# Patient Record
Sex: FEMALE | Race: WHITE | NOT HISPANIC OR LATINO | Employment: UNEMPLOYED | URBAN - METROPOLITAN AREA
[De-identification: names, ages, dates, MRNs, and addresses within clinical notes are randomized per-mention and may not be internally consistent; named-entity substitution may affect disease eponyms.]

---

## 2017-05-02 ENCOUNTER — ALLSCRIPTS OFFICE VISIT (OUTPATIENT)
Dept: OTHER | Facility: OTHER | Age: 27
End: 2017-05-02

## 2017-05-02 DIAGNOSIS — A60.00 HERPESVIRAL INFECTION OF UROGENITAL SYSTEM: ICD-10-CM

## 2017-05-24 ENCOUNTER — HOSPITAL ENCOUNTER (EMERGENCY)
Facility: HOSPITAL | Age: 27
Discharge: HOME | End: 2017-05-24
Attending: EMERGENCY MEDICINE | Admitting: EMERGENCY MEDICINE
Payer: COMMERCIAL

## 2017-05-24 VITALS
WEIGHT: 201 LBS | TEMPERATURE: 98.5 F | DIASTOLIC BLOOD PRESSURE: 80 MMHG | SYSTOLIC BLOOD PRESSURE: 129 MMHG | OXYGEN SATURATION: 100 % | HEART RATE: 71 BPM | RESPIRATION RATE: 18 BRPM

## 2017-05-24 DIAGNOSIS — R10.2 PELVIC PAIN: Primary | ICD-10-CM

## 2017-05-24 LAB
AMORPH URATE CRY URNS QL MICRO: ABNORMAL /HPF
BACTERIA UR QL AUTO: ABNORMAL /HPF
BILIRUB UR QL STRIP: NEGATIVE
CAOX CRY URNS QL MICRO: ABNORMAL /HPF
CLARITY UR: CLEAR
COLOR UR: YELLOW
GLUCOSE UR STRIP-MCNC: NEGATIVE MG/DL
HCG UR QL: NEGATIVE
HGB UR QL STRIP.AUTO: ABNORMAL
KETONES UR STRIP-MCNC: ABNORMAL MG/DL
LEUKOCYTE ESTERASE UR QL STRIP: NEGATIVE
NITRITE UR QL STRIP: NEGATIVE
NON-SQ EPI CELLS URNS QL MICRO: ABNORMAL /HPF
PH UR STRIP.AUTO: 5.5 [PH] (ref 4.5–8)
PROT UR STRIP-MCNC: NEGATIVE MG/DL
RBC #/AREA URNS AUTO: ABNORMAL /HPF
SP GR UR STRIP.AUTO: 1.02 (ref 1–1.03)
UROBILINOGEN UR QL STRIP.AUTO: 0.2 E.U./DL
WBC #/AREA URNS AUTO: ABNORMAL /HPF

## 2017-05-24 PROCEDURE — 81025 URINE PREGNANCY TEST: CPT

## 2017-05-24 PROCEDURE — 99284 EMERGENCY DEPT VISIT MOD MDM: CPT

## 2017-05-24 PROCEDURE — 81001 URINALYSIS AUTO W/SCOPE: CPT | Performed by: EMERGENCY MEDICINE

## 2017-05-24 PROCEDURE — 96372 THER/PROPH/DIAG INJ SC/IM: CPT

## 2017-05-24 PROCEDURE — 87591 N.GONORRHOEAE DNA AMP PROB: CPT

## 2017-05-24 PROCEDURE — 87491 CHLMYD TRACH DNA AMP PROBE: CPT | Performed by: PHYSICIAN ASSISTANT

## 2017-05-24 RX ORDER — IBUPROFEN 600 MG/1
600 TABLET ORAL ONCE
Status: COMPLETED | OUTPATIENT
Start: 2017-05-24 | End: 2017-05-24

## 2017-05-24 RX ORDER — DOXYCYCLINE 100 MG/1
100 TABLET ORAL 2 TIMES DAILY
Qty: 28 TABLET | Refills: 0 | Status: SHIPPED | OUTPATIENT
Start: 2017-05-24 | End: 2017-06-07

## 2017-05-24 RX ADMIN — CEFTRIAXONE SODIUM 250 MG: 250 INJECTION, POWDER, FOR SOLUTION INTRAMUSCULAR; INTRAVENOUS at 21:41

## 2017-05-24 RX ADMIN — IBUPROFEN 600 MG: 600 TABLET ORAL at 21:00

## 2017-06-06 LAB — MISCELLANEOUS LAB TEST RESULT: NORMAL

## 2018-01-09 NOTE — MISCELLANEOUS
Message  pt  called requesting refill of Celeste--states she re-started as her period is still irregular and she does not desire pregnancy  Physicians tasked      Active Problems    1  Abnormal uterine bleeding (AUB) (626 9) (N93 9)   2  Amenorrhea (626 0) (N91 2)   3  Contraceptive management (V25 9) (Z30 9)   4  Encounter for routine gynecological examination with Papanicolaou smear of cervix   (V72 31,V76 2) (Z01 419)   5  Genital herpes simplex (054 10) (A60 00)   6  Obesity (278 00) (E66 9)   7  Polycystic ovaries (256 4) (E28 2)   8  Screening for STDs (sexually transmitted diseases) (V74 5) (Z11 3)   9  Sebaceous cyst (706 2) (L72 3)   10  UTI symptoms (788 99) (R39 9)   11  Vaginal discharge (623 5) (N89 8)    Current Meds   1  Acyclovir 400 MG Oral Tablet; TAKE 1 TABLET 3 TIMES DAILY; Therapy: [id number] to (Lanie Pont)  Requested for: [id number]; Last   Rx:[id number] Ordered    Allergies    1  Erythromycin Derivatives    2  No Known Environmental Allergies   3   No Known Food Allergies    Signatures   Electronically signed by : Julieta Byrd RN; Dec  8 2016 11:21AM EST                       (Author)

## 2018-01-13 VITALS
WEIGHT: 210 LBS | HEIGHT: 61 IN | SYSTOLIC BLOOD PRESSURE: 133 MMHG | BODY MASS INDEX: 39.65 KG/M2 | DIASTOLIC BLOOD PRESSURE: 79 MMHG

## 2018-03-26 ENCOUNTER — HOSPITAL ENCOUNTER (EMERGENCY)
Facility: HOSPITAL | Age: 28
Discharge: HOME | End: 2018-03-26
Attending: EMERGENCY MEDICINE | Admitting: EMERGENCY MEDICINE

## 2018-03-26 VITALS
SYSTOLIC BLOOD PRESSURE: 157 MMHG | HEART RATE: 104 BPM | WEIGHT: 218.26 LBS | BODY MASS INDEX: 41.24 KG/M2 | DIASTOLIC BLOOD PRESSURE: 84 MMHG | TEMPERATURE: 98.3 F | RESPIRATION RATE: 18 BRPM | OXYGEN SATURATION: 100 %

## 2018-03-26 DIAGNOSIS — R09.82 POSTNASAL DRIP: ICD-10-CM

## 2018-03-26 DIAGNOSIS — B97.89 SORE THROAT (VIRAL): ICD-10-CM

## 2018-03-26 DIAGNOSIS — S00.81XA ABRASION OF FACE, INITIAL ENCOUNTER: ICD-10-CM

## 2018-03-26 DIAGNOSIS — J06.9 URI WITH COUGH AND CONGESTION: Primary | ICD-10-CM
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
## 2018-03-26 DIAGNOSIS — J02.8 SORE THROAT (VIRAL): ICD-10-CM

## 2018-03-26 DIAGNOSIS — S09.90XA CLOSED HEAD INJURY, INITIAL ENCOUNTER: ICD-10-CM

## 2018-03-26 PROCEDURE — 99282 EMERGENCY DEPT VISIT SF MDM: CPT

## 2018-03-26 RX ORDER — FLUTICASONE PROPIONATE 50 MCG
1 SPRAY, SUSPENSION (ML) NASAL DAILY
Qty: 16 G | Refills: 0 | Status: SHIPPED | OUTPATIENT
Start: 2018-03-26 | End: 2018-05-29 | Stop reason: ALTCHOICE
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
## 2018-03-26 NOTE — DISCHARGE INSTRUCTIONS
Postnasal Drip   AMBULATORY CARE:   Postnasal drip  is a condition that causes a large amount of mucus to collect in your throat or nose  It may also be called upper airway cough syndrome because the mucus causes repeated coughing  You may have a sore throat, or throat tissues may swell  This may feel like a lump in your throat  You may also feel like you need to clear your throat often  Contact your healthcare provider if:   · You have trouble breathing because of the mucus  · You have new or worsening symptoms, even with treatment  · You have signs of an infection, such as yellow or green mucus, or a fever  · You have questions or concerns about your condition or care  Treatment  may include any of the following:  · Medicines  may be given to thin the mucus  You may need to swallow the medicine or use a device to flush your sinuses with liquid squirted into your nose  Nasal sprays may also be needed to keep the tissues in your nose moist  Medicines can also relieve congestion  Allergy medicine may help if your symptoms are caused by seasonal allergies, such as hay fever  You may need medicine to help control GERD  · Antibiotics  may be needed to treat a bacterial infection  Manage postnasal drip:   · Use a humidifier or vaporizer  Use a cool mist humidifier or a vaporizer to increase air moisture in your home  This may make it easier for you to breathe  · Drink more liquids as directed  Liquids help keep your air passages moist and help you cough up mucus  Ask how much liquid to drink each day and which liquids are best for you  · Avoid cold air and dry, heated air  Cold or dry air can trigger postnasal drip  Try to stay inside on cold days, or keep your mouth covered  Do not stay long in areas that have dry, heated air  · Do not smoke, and avoid secondhand smoke  Nicotine and other chemicals in cigarettes and cigars can irritate your throat and make coughing worse   Ask your healthcare provider for information if you currently smoke and need help to quit  E-cigarettes or smokeless tobacco still contain nicotine  Talk to your healthcare provider before you use these products  Follow up with your healthcare provider as directed:  Write down your questions so you remember to ask them during your visits  © 2017 [employer] Information is for End User's use only and may not be sold, redistributed or otherwise used for commercial purposes  All illustrations and images included in CareNotes® are the copyrighted property of [employer]  [employer]  or [employer]  The above information is an  only  It is not intended as medical advice for individual conditions or treatments  Talk to your doctor, nurse or pharmacist before following any medical regimen to see if it is safe and effective for you  Upper Respiratory Infection   AMBULATORY CARE:   An upper respiratory infection  is also called a common cold  It can affect your nose, throat, ears, and sinuses  Common signs and symptoms include the following:  Cold symptoms are usually worst for the first 3 to 5 days  You may have any of the following:  · Runny or stuffy nose    · Sneezing and coughing    · Sore throat or hoarseness    · Red, watery, and sore eyes    · Fatigue     · Chills and fever    · Headache, body aches, or sore muscles  Seek care immediately if:   · You have chest pain or trouble breathing  Contact your healthcare provider if:   · You have a fever over 102ºF (39°C)  · Your sore throat gets worse or you see white or yellow spots in your throat  · Your symptoms get worse after 3 to 5 days or your cold is not better in 14 days  · You have a rash anywhere on your skin  · You have large, tender lumps in your neck  · You have thick, green or yellow drainage from your nose  · You cough up thick yellow, green, or bloody mucus      · You have vomiting for more than 24 hours and cannot keep fluids down  · You have a bad earache  · You have questions or concerns about your condition or care  Treatment for a cold: There is no cure for the common cold  Colds are caused by viruses and do not get better with antibiotics  Most people get better in 7 to 14 days  You may continue to cough for 2 to 3 weeks  The following may help decrease your symptoms:  · Decongestants  help reduce nasal congestion and help you breathe more easily  If you take decongestant pills, they may make you feel restless or not able to sleep  Do not use decongestant sprays for more than a few days  · Cough suppressants  help reduce coughing  Ask your healthcare provider which type of cough medicine is best for you  · NSAIDs , such as ibuprofen, help decrease swelling, pain, and fever  NSAIDs can cause stomach bleeding or kidney problems in certain people  If you take blood thinner medicine, always ask your healthcare provider if NSAIDs are safe for you  Always read the medicine label and follow directions  · Acetaminophen  decreases pain and fever  It is available without a doctor's order  Ask how much to take and how often to take it  Follow directions  Read the labels of all other medicines you are using to see if they also contain acetaminophen, or ask your doctor or pharmacist  Acetaminophen can cause liver damage if not taken correctly  Do not use more than 4 grams (4,000 milligrams) total of acetaminophen in one day  Manage your cold:   · Rest as much as possible  Slowly start to do more each day  · Drink more liquids as directed  Liquids will help thin and loosen mucus so you can cough it up  Liquids will also help prevent dehydration  Liquids that help prevent dehydration include water, fruit juice, and broth  Do not drink liquids that contain caffeine  Caffeine can increase your risk for dehydration  Ask your healthcare provider how much liquid to drink each day       · Soothe a sore throat  Gargle with warm salt water  This helps your sore throat feel better  Make salt water by dissolving ¼ teaspoon salt in 1 cup warm water  You may also suck on hard candy or throat lozenges  You may use a sore throat spray  · Use a humidifier or vaporizer  Use a cool mist humidifier or a vaporizer to increase air moisture in your home  This may make it easier for you to breathe and help decrease your cough  · Use saline nasal drops as directed  These help relieve congestion  · Apply petroleum-based jelly around the outside of your nostrils  This can decrease irritation from blowing your nose  · Do not smoke  Nicotine and other chemicals in cigarettes and cigars can make your symptoms worse  They can also cause infections such as bronchitis or pneumonia  Ask your healthcare provider for information if you currently smoke and need help to quit  E-cigarettes or smokeless tobacco still contain nicotine  Talk to your healthcare provider before you use these products  Prevent spreading your cold to others:   · Try to stay away from other people during the first 2 to 3 days of your cold when it is more easily spread  · Do not share food or drinks  · Do not share hand towels with household members  · Wash your hands often, especially after you blow your nose  Turn away from other people and cover your mouth and nose with a tissue when you sneeze or cough  Follow up with your healthcare provider as directed:  Write down your questions so you remember to ask them during your visits  © 2017 [employer] Information is for End User's use only and may not be sold, redistributed or otherwise used for commercial purposes  All illustrations and images included in CareNotes® are the copyrighted property of [employer]  or [employer]ey [employer]  The above information is an  only   It is not intended as medical advice for individual conditions or treatments  Talk to your doctor, nurse or pharmacist before following any medical regimen to see if it is safe and effective for you

## 2018-04-11 ENCOUNTER — OFFICE VISIT (OUTPATIENT)
Dept: URGENT CARE | Facility: MEDICAL CENTER | Age: 28
End: 2018-04-11

## 2018-04-11 VITALS
TEMPERATURE: 98.5 F | WEIGHT: 217.6 LBS | OXYGEN SATURATION: 98 % | HEART RATE: 74 BPM | BODY MASS INDEX: 37.15 KG/M2 | DIASTOLIC BLOOD PRESSURE: 88 MMHG | SYSTOLIC BLOOD PRESSURE: 124 MMHG | HEIGHT: 64 IN | RESPIRATION RATE: 20 BRPM

## 2018-04-11 DIAGNOSIS — A08.4 VIRAL GASTROENTERITIS: Primary | ICD-10-CM

## 2018-04-11 DIAGNOSIS — H60.502 ACUTE OTITIS EXTERNA OF LEFT EAR, UNSPECIFIED TYPE: ICD-10-CM

## 2018-04-11 DIAGNOSIS — R10.84 GENERALIZED ABDOMINAL PAIN: ICD-10-CM

## 2018-04-11 LAB
SL AMB  POCT GLUCOSE, UA: ABNORMAL
SL AMB LEUKOCYTE ESTERASE,UA: ABNORMAL
SL AMB POCT BILIRUBIN,UA: ABNORMAL
SL AMB POCT BLOOD,UA: ABNORMAL
SL AMB POCT CLARITY,UA: CLEAR
SL AMB POCT COLOR,UA: YELLOW
SL AMB POCT KETONES,UA: ABNORMAL
SL AMB POCT NITRITE,UA: ABNORMAL
SL AMB POCT PH,UA: 6
SL AMB POCT SPECIFIC GRAVITY,UA: 1.02
SL AMB POCT URINE HCG: NEGATIVE
SL AMB POCT URINE PROTEIN: ABNORMAL
SL AMB POCT UROBILINOGEN: 0.2

## 2018-04-11 PROCEDURE — 81002 URINALYSIS NONAUTO W/O SCOPE: CPT | Performed by: NURSE PRACTITIONER

## 2018-04-11 PROCEDURE — 87086 URINE CULTURE/COLONY COUNT: CPT | Performed by: NURSE PRACTITIONER

## 2018-04-11 PROCEDURE — 81025 URINE PREGNANCY TEST: CPT | Performed by: NURSE PRACTITIONER

## 2018-04-11 PROCEDURE — 99213 OFFICE O/P EST LOW 20 MIN: CPT | Performed by: NURSE PRACTITIONER

## 2018-04-11 RX ORDER — PEDI MULTIVIT NO.91/IRON FUM 15 MG
1 TABLET,CHEWABLE ORAL
COMMUNITY
End: 2018-05-29 | Stop reason: ALTCHOICE

## 2018-04-11 RX ORDER — ONDANSETRON 4 MG/1
4 TABLET, ORALLY DISINTEGRATING ORAL EVERY 6 HOURS PRN
Qty: 20 TABLET | Refills: 0 | Status: SHIPPED | OUTPATIENT
Start: 2018-04-11 | End: 2018-05-29 | Stop reason: ALTCHOICE

## 2018-04-11 RX ORDER — OFLOXACIN 3 MG/ML
5 SOLUTION AURICULAR (OTIC) 2 TIMES DAILY
Qty: 5 ML | Refills: 0 | Status: SHIPPED | OUTPATIENT
Start: 2018-04-11 | End: 2018-05-29 | Stop reason: ALTCHOICE

## 2018-04-11 RX ORDER — DROSPIRENONE AND ETHINYL ESTRADIOL 0.02-3(28)
1 KIT ORAL
COMMUNITY
Start: 2018-03-19 | End: 2018-06-27 | Stop reason: ALTCHOICE

## 2018-04-11 NOTE — LETTER
April 11, 2018     Patient: Laura Patel   YOB: 1990   Date of Visit: 4/11/2018       To Whom it May Concern:    Laura Patel was seen in my clinic on 4/11/2018  She may return to work on 4/12/2018  If you have any questions or concerns, please don't hesitate to call           Sincerely,          SHERMAN Rivera        CC: No Recipients

## 2018-04-11 NOTE — PROGRESS NOTES
[facility] [employer] [facility]    NAME: Carrie Roa is a 29 y o  female  : 1990    MRN: [id number]  DATE: 2018  TIME: 10:08 AM    Assessment and Plan   Viral gastroenteritis [A08 4]  1  Viral gastroenteritis  ondansetron (ZOFRAN-ODT) 4 mg disintegrating tablet   2  Acute otitis externa of left ear, unspecified type  ofloxacin (FLOXIN) 0 3 % otic solution   3  Generalized abdominal pain  POCT urine dip    POCT urine HCG    Urine culture         Patient Instructions       1  Zofran as needed for nausea  2  Ofloxicin ear drop   5 drops to the left ear twice a day  3  Increase fluids  4  We will send your urine for culture- I will call you if it is positive  5  Follow up with your PCP  6  Present to the ER with worsening or concerning symptoms       Chief Complaint   No chief complaint on file  History of Present Illness       30 y/o female presents with abdominal pain that started yesterday morning  Associated with nausea, diarrhea, cough, chills, fatigue  She denies sore throat, headache, ear pain, dysuria, back pain  She took Tylenol last night which improved her symptoms  LMP 4 weeks ago  She was recently tested for STI's which she states were negative  She is concerned about possible pregnancy  Review of Systems   Review of Systems   Constitutional: Positive for chills and fatigue  Negative for appetite change and fever  HENT: Negative for congestion and sore throat  Respiratory: Positive for cough  Gastrointestinal: Positive for diarrhea and nausea  Negative for abdominal pain and vomiting  Genitourinary: Negative for dysuria, vaginal discharge and vaginal pain  Musculoskeletal: Positive for back pain  Skin: Negative for rash  Neurological: Negative for headaches           Current Medications       Current Outpatient Prescriptions:     drospirenone-ethinyl estradiol (TATY) 3-0 02 MG per tablet, Take 1 tablet by mouth, Disp: , Rfl:     fluticasone (FLONASE) 50 mcg/act nasal spray, 1 spray into each nostril daily, Disp: 16 g, Rfl: 0    losartan (COZAAR) 25 mg tablet, Take 25 mg by mouth daily, Disp: , Rfl:     ondansetron (ZOFRAN) 4 mg tablet, Take 1 tablet by mouth every 8 (eight) hours as needed for nausea or vomiting, Disp: 15 tablet, Rfl: 0    pediatric multivitamin-iron (POLY-VI-SOL with IRON) 15 MG chewable tablet, Chew 1 tablet, Disp: , Rfl:     Prenatal MV-Min-Fe Fum-FA-DHA (PRENATAL 1 PO), Take by mouth, Disp: , Rfl:     ofloxacin (FLOXIN) 0 3 % otic solution, Administer 5 drops into the left ear 2 (two) times a day, Disp: 5 mL, Rfl: 0    ondansetron (ZOFRAN-ODT) 4 mg disintegrating tablet, Take 1 tablet (4 mg total) by mouth every 6 (six) hours as needed for nausea or vomiting, Disp: 20 tablet, Rfl: 0    Current Allergies     Allergies as of 04/11/2018 - Reviewed 04/11/2018   Allergen Reaction Noted    Erythromycin Shortness Of Breath 10/26/2015    Levonorgestrel-ethinyl estrad Hypertension 05/22/2017    Erythromycin base  05/09/2016    Medical tape Rash 04/28/2017            The following portions of the patient's history were reviewed and updated as appropriate: allergies, current medications, past family history, past medical history, past social history, past surgical history and problem list      Past Medical History:   Diagnosis Date    Gallstone     Genital herpes     Hypertension        History reviewed  No pertinent surgical history  History reviewed  No pertinent family history  Medications have been verified  Objective   /88   Pulse 74   Temp 98 5 °F (36 9 °C) (Temporal)   Resp 20   Ht 5' 4" (1 626 m)   Wt 98 7 kg (217 lb 9 6 oz)   SpO2 98%   BMI 37 35 kg/m²        Physical Exam     Physical Exam   Constitutional: She is oriented to person, place, and time  Vital signs are normal  She appears well-developed and well-nourished  She is active  No distress  HENT:   Head: Normocephalic and atraumatic     Right Ear: External ear normal    Left Ear: Tympanic membrane and external ear normal    Nose: Nose normal    Mouth/Throat: Oropharynx is clear and moist  No oropharyngeal exudate  Left Canal erythematous    Eyes: Conjunctivae are normal  Pupils are equal, round, and reactive to light  Cardiovascular: Normal rate, regular rhythm, S1 normal, S2 normal and normal heart sounds  No murmur heard  Pulmonary/Chest: Effort normal and breath sounds normal  No respiratory distress  She has no wheezes  She has no rales  Abdominal: Soft  Bowel sounds are normal  There is no tenderness  There is no CVA tenderness  Neurological: She is alert and oriented to person, place, and time

## 2018-04-11 NOTE — PATIENT INSTRUCTIONS
1  Zofran as needed for nausea  2  Ofloxicin ear drop   5 drops to the left ear twice a day  3  Increase fluids  4  We will send your urine for culture- I will call you if it is positive  5  Follow up with your PCP  6  Present to the ER with worsening or concerning symptoms     Acute Nausea and Vomiting   WHAT YOU NEED TO KNOW:   Acute nausea and vomiting start suddenly, worsen quickly, and last a short time  DISCHARGE INSTRUCTIONS:   Return to the emergency department if:   · You see blood in your vomit or your bowel movements  · You have sudden, severe pain in your chest and upper abdomen after hard vomiting or retching  · You have swelling in your neck and chest      · You are dizzy, cold, and thirsty and your eyes and mouth are dry  · You are urinating very little or not at all  · You have muscle weakness, leg cramps, and trouble breathing  · Your heart is beating much faster than normal      · You continue to vomit for more than 48 hours  Contact your healthcare provider if:   · You have frequent dry heaves (vomiting but nothing comes out)  · Your nausea and vomiting does not get better or go away after you use medicine  · You have questions or concerns about your condition or treatment  Medicines: You may need any of the following:  · Medicines  may be given to calm your stomach and stop your vomiting  You may also need medicines to help you feel more relaxed or to stop nausea and vomiting caused by motion sickness  · Gastrointestinal stimulants  are used to help empty your stomach and bowels  This may help decrease nausea and vomiting  · Take your medicine as directed  Contact your healthcare provider if you think your medicine is not helping or if you have side effects  Tell him or her if you are allergic to any medicine  Keep a list of the medicines, vitamins, and herbs you take  Include the amounts, and when and why you take them   Bring the list or the pill bottles to follow-up visits  Carry your medicine list with you in case of an emergency  Prevent or manage acute nausea and vomiting:   · Do not drink alcohol  Alcohol may upset or irritate your stomach  Too much alcohol can also cause acute nausea and vomiting  · Control stress  Headaches due to stress may cause nausea and vomiting  Find ways to relax and manage your stress  Get more rest and sleep  · Drink more liquids as directed  Vomiting can lead to dehydration  It is important to drink more liquids to help replace lost body fluids  Ask your healthcare provider how much liquid to drink each day and which liquids are best for you  Your provider may recommend that you drink an oral rehydration solution (ORS)  ORS contains water, salts, and sugar that are needed to replace the lost body fluids  Ask what kind of ORS to use, how much to drink, and where to get it  · Eat smaller meals, more often  Eat small amounts of food every 2 to 3 hours, even if you are not hungry  Food in your stomach may decrease your nausea  · Talk to your healthcare provider before you take over-the-counter (OTC) medicines  These medicines can cause serious problems if you use certain other medicines, or you have a medical condition  You may have problems if you use too much or use them for longer than the label says  Follow directions on the label carefully  Follow up with your healthcare provider as directed:  Write down your questions so you remember to ask them during your follow-up visits  © 2017 [employer] Information is for End User's use only and may not be sold, redistributed or otherwise used for commercial purposes  All illustrations and images included in CareNotes® are the copyrighted property of A D A M [employer] Inc  or [employer] Nam  The above information is an  only  It is not intended as medical advice for individual conditions or treatments   Talk to your doctor, nurse or pharmacist before following any medical regimen to see if it is safe and effective for you  Otitis Externa   WHAT YOU NEED TO KNOW:   Otitis externa, or swimmer's ear, is an infection in the outer ear canal  This canal goes from the outside of the ear to the eardrum  DISCHARGE INSTRUCTIONS:   Return to the emergency department if:   · You have severe ear pain  · You are suddenly unable to hear at all  · You have new swelling in your face, behind your ears, or in your neck  · You suddenly cannot move part of your face  · Your face suddenly feels numb  Contact your healthcare provider if:   · You have a fever  · Your signs and symptoms do not get better after 2 days of treatment  · Your signs and symptoms go away for a time, but then come back  · You have questions or concerns about your condition or care  Medicines:   · NSAIDs , such as ibuprofen, help decrease swelling, pain, and fever  This medicine is available with or without a doctor's order  NSAIDs can cause stomach bleeding or kidney problems in certain people  If you take blood thinner medicine, always ask if NSAIDs are safe for you  Always read the medicine label and follow directions  Do not give these medicines to children under 10months of age without direction from your child's healthcare provider  · Acetaminophen  decreases pain and fever  It is available without a doctor's order  Ask how much to take and how often to take it  Follow directions  Acetaminophen can cause liver damage if not taken correctly  · Ear drops  that contain an antibiotic may be given  The antibiotic helps treat a bacterial infection  You may also be given steroid medicine  The steroid helps decrease redness, swelling, and pain  · Take your medicine as directed  Contact your healthcare provider if you think your medicine is not helping or if you have side effects  Tell him or her if you are allergic to any medicine   Keep a list of the medicines, vitamins, and herbs you take  Include the amounts, and when and why you take them  Bring the list or the pill bottles to follow-up visits  Carry your medicine list with you in case of an emergency  Follow up with your healthcare provider as directed:  Write down your questions so you remember to ask them during your visits  How to use eardrops:   · Lie down on your side with your infected ear facing up  · Carefully drip the correct number of eardrops into your ear  Have another person help you if possible  · Gently move the outside part of your ear back and forth to help the medicine reach your ear canal      · Stay lying down in the same position (with your ear facing up) for 3 to 5 minutes  Prevent otitis externa:   · Do not put cotton swabs or foreign objects in your ears  · Wrap a clean moist washcloth around your finger, and use it to clean your outer ear and remove extra ear wax  · Use ear plugs when you swim  Dry your outer ears completely after you swim or bathe  © 2017 [facility] [facility] Information is for End User's use only and may not be sold, redistributed or otherwise used for commercial purposes  All illustrations and images included in CareNotes® are the copyrighted property of A D A M , Inc  or [employer] Nam  The above information is an  only  It is not intended as medical advice for individual conditions or treatments  Talk to your doctor, nurse or pharmacist before following any medical regimen to see if it is safe and effective for you

## 2018-04-12 LAB — BACTERIA UR CULT: ABNORMAL

## 2018-04-13 ENCOUNTER — TELEPHONE (OUTPATIENT)
Dept: URGENT CARE | Facility: MEDICAL CENTER | Age: 28
End: 2018-04-13

## 2018-04-13 NOTE — TELEPHONE ENCOUNTER
Called number listed for patient  - the number is not accepting calls at this time  I called number for her mother [phone]- went to -   Mailbox full unable to leave

## 2018-04-30 ENCOUNTER — TRANSCRIBE ORDERS (OUTPATIENT)
Dept: URGENT CARE | Facility: MEDICAL CENTER | Age: 28
End: 2018-04-30

## 2018-04-30 ENCOUNTER — APPOINTMENT (OUTPATIENT)
Dept: URGENT CARE | Facility: MEDICAL CENTER | Age: 28
End: 2018-04-30

## 2018-04-30 DIAGNOSIS — Z00.00 PHYSICAL EXAM: Primary | ICD-10-CM

## 2018-04-30 DIAGNOSIS — Z00.00 PHYSICAL EXAM: ICD-10-CM

## 2018-04-30 PROCEDURE — 86787 VARICELLA-ZOSTER ANTIBODY: CPT

## 2018-04-30 PROCEDURE — 86480 TB TEST CELL IMMUN MEASURE: CPT

## 2018-05-01 LAB — VZV IGG SER IA-ACNC: NORMAL

## 2018-05-02 LAB
ANNOTATION COMMENT IMP: NORMAL
GAMMA INTERFERON BACKGROUND BLD IA-ACNC: 0.07 IU/ML
M TB IFN-G BLD-IMP: NEGATIVE
M TB IFN-G CD4+ BCKGRND COR BLD-ACNC: 0.02 IU/ML
M TB IFN-G CD4+ T-CELLS BLD-ACNC: 0.09 IU/ML
MITOGEN IGNF BLD-ACNC: >10 IU/ML
QUANTIFERON-TB GOLD IN TUBE: NORMAL
SERVICE CMNT-IMP: NORMAL

## 2018-05-29 ENCOUNTER — HOSPITAL ENCOUNTER (EMERGENCY)
Facility: HOSPITAL | Age: 28
Discharge: HOME | End: 2018-05-29
Attending: EMERGENCY MEDICINE | Admitting: EMERGENCY MEDICINE
Payer: COMMERCIAL

## 2018-05-29 ENCOUNTER — APPOINTMENT (EMERGENCY)
Dept: RADIOLOGY | Facility: HOSPITAL | Age: 28
End: 2018-05-29
Payer: COMMERCIAL

## 2018-05-29 VITALS
DIASTOLIC BLOOD PRESSURE: 62 MMHG | BODY MASS INDEX: 39.27 KG/M2 | RESPIRATION RATE: 16 BRPM | WEIGHT: 230 LBS | HEART RATE: 104 BPM | HEIGHT: 64 IN | SYSTOLIC BLOOD PRESSURE: 119 MMHG | OXYGEN SATURATION: 99 % | TEMPERATURE: 97.8 F

## 2018-05-29 DIAGNOSIS — R07.89 MUSCULOSKELETAL CHEST PAIN: Primary | ICD-10-CM

## 2018-05-29 LAB
ALBUMIN SERPL BCP-MCNC: 3.2 G/DL (ref 3.5–5)
ALP SERPL-CCNC: 72 U/L (ref 46–116)
ALT SERPL W P-5'-P-CCNC: 19 U/L (ref 12–78)
ANION GAP SERPL CALCULATED.3IONS-SCNC: 6 MMOL/L (ref 4–13)
AST SERPL W P-5'-P-CCNC: 12 U/L (ref 5–45)
ATRIAL RATE: 97 BPM
BASOPHILS # BLD AUTO: 0.02 THOUSANDS/ΜL (ref 0–0.1)
BASOPHILS NFR BLD AUTO: 0 % (ref 0–1)
BILIRUB SERPL-MCNC: 0.19 MG/DL (ref 0.2–1)
BUN SERPL-MCNC: 16 MG/DL (ref 5–25)
CALCIUM SERPL-MCNC: 8.8 MG/DL (ref 8.3–10.1)
CHLORIDE SERPL-SCNC: 103 MMOL/L (ref 100–108)
CO2 SERPL-SCNC: 28 MMOL/L (ref 21–32)
CREAT SERPL-MCNC: 0.74 MG/DL (ref 0.6–1.3)
DEPRECATED D DIMER PPP: <220 NG/ML (FEU) (ref 0–424)
EOSINOPHIL # BLD AUTO: 0.08 THOUSAND/ΜL (ref 0–0.61)
EOSINOPHIL NFR BLD AUTO: 1 % (ref 0–6)
ERYTHROCYTE [DISTWIDTH] IN BLOOD BY AUTOMATED COUNT: 13.1 % (ref 11.6–15.1)
EXT PREG TEST URINE: NORMAL
GFR SERPL CREATININE-BSD FRML MDRD: 111 ML/MIN/1.73SQ M
GLUCOSE SERPL-MCNC: 87 MG/DL (ref 65–140)
HCT VFR BLD AUTO: 45.2 % (ref 34.8–46.1)
HGB BLD-MCNC: 15 G/DL (ref 11.5–15.4)
LIPASE SERPL-CCNC: 164 U/L (ref 73–393)
LYMPHOCYTES # BLD AUTO: 3.12 THOUSANDS/ΜL (ref 0.6–4.47)
LYMPHOCYTES NFR BLD AUTO: 37 % (ref 14–44)
MCH RBC QN AUTO: 29.4 PG (ref 26.8–34.3)
MCHC RBC AUTO-ENTMCNC: 33.2 G/DL (ref 31.4–37.4)
MCV RBC AUTO: 89 FL (ref 82–98)
MONOCYTES # BLD AUTO: 0.51 THOUSAND/ΜL (ref 0.17–1.22)
MONOCYTES NFR BLD AUTO: 6 % (ref 4–12)
NEUTROPHILS # BLD AUTO: 4.58 THOUSANDS/ΜL (ref 1.85–7.62)
NEUTS SEG NFR BLD AUTO: 55 % (ref 43–75)
NRBC BLD AUTO-RTO: 0 /100 WBCS
P AXIS: 62 DEGREES
PLATELET # BLD AUTO: 287 THOUSANDS/UL (ref 149–390)
PMV BLD AUTO: 10.8 FL (ref 8.9–12.7)
POTASSIUM SERPL-SCNC: 3.2 MMOL/L (ref 3.5–5.3)
PR INTERVAL: 188 MS
PROT SERPL-MCNC: 8 G/DL (ref 6.4–8.2)
QRS AXIS: 16 DEGREES
QRSD INTERVAL: 84 MS
QT INTERVAL: 328 MS
QTC INTERVAL: 416 MS
RBC # BLD AUTO: 5.11 MILLION/UL (ref 3.81–5.12)
SODIUM SERPL-SCNC: 137 MMOL/L (ref 136–145)
T WAVE AXIS: 38 DEGREES
VENTRICULAR RATE: 97 BPM
WBC # BLD AUTO: 8.36 THOUSAND/UL (ref 4.31–10.16)

## 2018-05-29 PROCEDURE — 83690 ASSAY OF LIPASE: CPT | Performed by: EMERGENCY MEDICINE

## 2018-05-29 PROCEDURE — 80053 COMPREHEN METABOLIC PANEL: CPT | Performed by: EMERGENCY MEDICINE

## 2018-05-29 PROCEDURE — 85379 FIBRIN DEGRADATION QUANT: CPT | Performed by: EMERGENCY MEDICINE

## 2018-05-29 PROCEDURE — 93010 ELECTROCARDIOGRAM REPORT: CPT | Performed by: INTERNAL MEDICINE

## 2018-05-29 PROCEDURE — 36415 COLL VENOUS BLD VENIPUNCTURE: CPT | Performed by: EMERGENCY MEDICINE

## 2018-05-29 PROCEDURE — 93005 ELECTROCARDIOGRAM TRACING: CPT

## 2018-05-29 PROCEDURE — 71046 X-RAY EXAM CHEST 2 VIEWS: CPT

## 2018-05-29 PROCEDURE — 96375 TX/PRO/DX INJ NEW DRUG ADDON: CPT

## 2018-05-29 PROCEDURE — 99285 EMERGENCY DEPT VISIT HI MDM: CPT

## 2018-05-29 PROCEDURE — 96365 THER/PROPH/DIAG IV INF INIT: CPT

## 2018-05-29 PROCEDURE — 85025 COMPLETE CBC W/AUTO DIFF WBC: CPT | Performed by: EMERGENCY MEDICINE

## 2018-05-29 PROCEDURE — 81025 URINE PREGNANCY TEST: CPT | Performed by: EMERGENCY MEDICINE

## 2018-05-29 RX ORDER — POTASSIUM CHLORIDE 20 MEQ/1
20 TABLET, EXTENDED RELEASE ORAL ONCE
Status: COMPLETED | OUTPATIENT
Start: 2018-05-29 | End: 2018-05-29

## 2018-05-29 RX ORDER — POTASSIUM CHLORIDE 14.9 MG/ML
20 INJECTION INTRAVENOUS
Status: DISCONTINUED | OUTPATIENT
Start: 2018-05-29 | End: 2018-05-29 | Stop reason: HOSPADM

## 2018-05-29 RX ORDER — KETOROLAC TROMETHAMINE 30 MG/ML
15 INJECTION, SOLUTION INTRAMUSCULAR; INTRAVENOUS ONCE
Status: COMPLETED | OUTPATIENT
Start: 2018-05-29 | End: 2018-05-29

## 2018-05-29 RX ADMIN — POTASSIUM CHLORIDE 20 MEQ: 1500 TABLET, EXTENDED RELEASE ORAL at 10:13

## 2018-05-29 RX ADMIN — SODIUM CHLORIDE 1000 ML: 0.9 INJECTION, SOLUTION INTRAVENOUS at 09:28

## 2018-05-29 RX ADMIN — KETOROLAC TROMETHAMINE 15 MG: 30 INJECTION, SOLUTION INTRAMUSCULAR at 09:28

## 2018-05-29 RX ADMIN — POTASSIUM CHLORIDE 20 MEQ: 200 INJECTION, SOLUTION INTRAVENOUS at 09:46

## 2018-05-29 NOTE — ED PROVIDER NOTES
History  Chief Complaint   Patient presents with    Chest Pain     WOKE UP WITH CHEST PAIN  Numbness ini right arm and elevated BP     This is a 25-year-old female that presents today with chest pain  Patient states for the past week she has been having intermittent episodes of chest pain substernal feels sharp  She has also experienced shortness of breath with no correlation with activity  States she has never had these symptoms before  States recently she was put on losartan and believes that might be causing her symptoms  No cocaine use  Family history of blood clots along with Factor 5 Leiden  States she is on birth control pills  Denies any recent travel, hemoptysis, leg pain  States today she came in because her right arm felt slightly numb from her elbow down to her fingers  Denies any pain in her arm  Denies any trauma to the arm  No trauma to the chest   No previous history blood clots  States she felt a might be GERD so she took some Tums which did not relieve the pain  25-year-old female presenting with chest pain  Patient is low risk on wells criteria but perc positive so will do a D-dimer EKG lab work along with chest x-ray            Prior to Admission Medications   Prescriptions Last Dose Informant Patient Reported? Taking?   drospirenone-ethinyl estradiol (TATY) 3-0 02 MG per tablet  Self Yes Yes   Sig: Take 1 tablet by mouth   losartan (COZAAR) 25 mg tablet  Self Yes Yes   Sig: Take 100 mg by mouth daily        Facility-Administered Medications: None       Past Medical History:   Diagnosis Date    Gallstone     Genital herpes     Hypertension        History reviewed  No pertinent surgical history  History reviewed  No pertinent family history  I have reviewed and agree with the history as documented  Social History   Substance Use Topics    Smoking status: Never Smoker    Smokeless tobacco: Never Used    Alcohol use Yes        Review of Systems   Constitutional: Negative  Negative for diaphoresis and fever  HENT: Negative  Respiratory: Positive for shortness of breath  Negative for cough and wheezing  Cardiovascular: Positive for chest pain  Negative for palpitations and leg swelling  Gastrointestinal: Negative for abdominal distention, abdominal pain, nausea and vomiting  Genitourinary: Negative  Musculoskeletal: Negative  Skin: Negative  Neurological: Negative  Psychiatric/Behavioral: Negative  All other systems reviewed and are negative  Physical Exam  ED Triage Vitals   Temperature Pulse Respirations Blood Pressure SpO2   05/29/18 0804 05/29/18 0804 05/29/18 0804 05/29/18 0804 05/29/18 0804   97 8 °F (36 6 °C) 103 20 163/89 100 %      Temp Source Heart Rate Source Patient Position - Orthostatic VS BP Location FiO2 (%)   05/29/18 0804 05/29/18 0804 -- 05/29/18 0911 --   Tympanic Monitor  Left arm       Pain Score       05/29/18 0804       3           Orthostatic Vital Signs  Vitals:    05/29/18 0804 05/29/18 0911   BP: 163/89 119/62   Pulse: 103 104       Physical Exam   Constitutional: She is oriented to person, place, and time  She appears well-developed and well-nourished  No distress  HENT:   Head: Normocephalic and atraumatic  Nose: Nose normal    Mouth/Throat: Oropharynx is clear and moist    Eyes: EOM are normal  Pupils are equal, round, and reactive to light  Neck: Normal range of motion  Neck supple  Cardiovascular: Regular rhythm and normal heart sounds  No murmur heard  Sinus tachycardia   Pulmonary/Chest: Effort normal and breath sounds normal    Abdominal: Soft  Bowel sounds are normal  She exhibits no distension  There is no tenderness  There is no rebound and no guarding  Musculoskeletal: Normal range of motion  She exhibits no edema, tenderness or deformity  Neurological: She is alert and oriented to person, place, and time  No cranial nerve deficit  Skin: Skin is warm and dry  She is not diaphoretic     Psychiatric: She has a normal mood and affect  Vitals reviewed  ED Medications  Medications   sodium chloride 0 9 % bolus 1,000 mL (0 mL Intravenous Stopped 5/29/18 1015)   ketorolac (TORADOL) injection 15 mg (15 mg Intravenous Given 5/29/18 0928)   potassium chloride (K-DUR,KLOR-CON) CR tablet 20 mEq (20 mEq Oral Given 5/29/18 1013)       Diagnostic Studies  Results Reviewed     Procedure Component Value Units Date/Time    POCT pregnancy, urine [[id number]]  (Normal) Resulted:  05/29/18 0909    Lab Status:  Final result Updated:  05/29/18 0909     EXT PREG TEST UR (Ref: Negative) neg    Comprehensive metabolic panel [[id number]]  (Abnormal) Collected:  05/29/18 0839    Lab Status:  Final result Specimen:  Blood from Arm, Right Updated:  05/29/18 0905     Sodium 137 mmol/L      Potassium 3 2 (L) mmol/L      Chloride 103 mmol/L      CO2 28 mmol/L      Anion Gap 6 mmol/L      BUN 16 mg/dL      Creatinine 0 74 mg/dL      Glucose 87 mg/dL      Calcium 8 8 mg/dL      AST 12 U/L      ALT 19 U/L      Alkaline Phosphatase 72 U/L      Total Protein 8 0 g/dL      Albumin 3 2 (L) g/dL      Total Bilirubin 0 19 (L) mg/dL      eGFR 111 ml/min/1 73sq m     Narrative:         National Kidney Disease Education Program recommendations are as follows:  GFR calculation is accurate only with a steady state creatinine  Chronic Kidney disease less than 60 ml/min/1 73 sq  meters  Kidney failure less than 15 ml/min/1 73 sq  meters      Lipase [[id number]]  (Normal) Collected:  05/29/18 0839    Lab Status:  Final result Specimen:  Blood from Arm, Right Updated:  05/29/18 0905     Lipase 164 u/L     D-Dimer [[id number]]  (Normal) Collected:  05/29/18 0839    Lab Status:  Final result Specimen:  Blood from Arm, Right Updated:  05/29/18 0902     D-Dimer, Quant <220 ng/ml (FEU)     CBC and differential [[id number]] Collected:  05/29/18 0839    Lab Status:  Final result Specimen:  Blood from Arm, Right Updated:  05/29/18 0857     WBC 8 36 Thousand/uL      RBC 5 11 Million/uL      Hemoglobin 15 0 g/dL      Hematocrit 45 2 %      MCV 89 fL      MCH 29 4 pg      MCHC 33 2 g/dL      RDW 13 1 %      MPV 10 8 fL      Platelets 414 Thousands/uL      nRBC 0 /100 WBCs      Neutrophils Relative 55 %      Lymphocytes Relative 37 %      Monocytes Relative 6 %      Eosinophils Relative 1 %      Basophils Relative 0 %      Neutrophils Absolute 4 58 Thousands/µL      Lymphocytes Absolute 3 12 Thousands/µL      Monocytes Absolute 0 51 Thousand/µL      Eosinophils Absolute 0 08 Thousand/µL      Basophils Absolute 0 02 Thousands/µL                  XR chest 2 views   Final Result by Holly Guevara MD (05/29 0901)      No acute cardiopulmonary disease  Workstation performed: [id number]               Procedures  Procedures      Phone Consults  ED Phone Contact    ED Course  ED Course as of May 29 1738   Tue May 29, 2018   7887 Procedure Note: EKG  Date/Time: 05/29/18 8:41 AM   Performed by: Yanira Davis   Authorized by: Yanira Davis   Indications / Diagnosis: CP  ECG reviewed by me, the ED Provider: yes   The EKG demonstrates:  Rhythm: normal sinus  Intervals: normal intervals  Axis: normal axis  QRS/Blocks: normal QRS  ST Changes: No acute ST Changes, no STD/TALAT       1004 Patient states feeling better  Patient states she will discuss with pcp regarding discontinuing blood pressure medication  She does not want potassium and states she will eat bananas at home  MDM  CritCare Time    Disposition  Final diagnoses:   Musculoskeletal chest pain     Time reflects when diagnosis was documented in both MDM as applicable and the Disposition within this note     Time User Action Codes Description Comment    5/29/2018 10:05 AM Jessica Dickerson Add [R07 89] Musculoskeletal chest pain       ED Disposition     ED Disposition Condition Comment    Discharge  [facility] discharge to home/self care      Condition at discharge: Good        Follow-up Information Follow up With Specialties Details Why [address], DO Family Medicine Schedule an appointment as soon as possible for a visit As needed, If symptoms worsen [address]  [address] [facility]  [phone]            Discharge Medication List as of 5/29/2018 10:06 AM      CONTINUE these medications which have NOT CHANGED    Details   drospirenone-ethinyl estradiol (TATY) 3-0 02 MG per tablet Take 1 tablet by mouth, Starting Mon 3/19/2018, Historical Med      losartan (COZAAR) 25 mg tablet Take 100 mg by mouth daily  , Historical Med           No discharge procedures on file  ED Provider  Attending physically available and evaluated Klaus Ernandez I managed the patient along with the ED Attending      Electronically Signed by         Tamica Osborne MD  05/29/18 7687

## 2018-05-29 NOTE — DISCHARGE INSTRUCTIONS

## 2018-06-02 NOTE — ED ATTENDING ATTESTATION
Brittnee Eid DO, saw and evaluated the patient  I have discussed the patient with the resident/non-physician practitioner and agree with the resident's/non-physician practitioner's findings, Plan of Care, and MDM as documented in the resident's/non-physician practitioner's note, except where noted  All available labs and Radiology studies were reviewed  At this point I agree with the current assessment done in the Emergency Department  I have conducted an independent evaluation of this patient a history and physical is as follows:    29 female c/o cp  Pt states she's been having cp x 1 week and has noted elevated BP  States been sharp, occ burning and substernal   Tried Tums with the no relief  Patient is slightly tachycardic, EKG is otherwise unremarkable, no ischemic changes, normal intervals, negative chest x-ray, labs otherwise unremarkable, D-dimer negative  Treated with Toradol for musculoskeletal chest pain, potassium included  Follow up primary care physician, return if worsens      Critical Care Time  CritCare Time    Procedures

## 2018-06-13 ENCOUNTER — APPOINTMENT (OUTPATIENT)
Dept: LAB | Facility: CLINIC | Age: 28
End: 2018-06-13

## 2018-06-13 ENCOUNTER — TRANSCRIBE ORDERS (OUTPATIENT)
Dept: LAB | Facility: CLINIC | Age: 28
End: 2018-06-13

## 2018-06-13 DIAGNOSIS — Z00.8 HEALTH EXAMINATION IN POPULATION SURVEY: Primary | ICD-10-CM

## 2018-06-13 DIAGNOSIS — Z00.8 HEALTH EXAMINATION IN POPULATION SURVEY: ICD-10-CM

## 2018-06-13 LAB
CHOLEST SERPL-MCNC: 210 MG/DL (ref 50–200)
EST. AVERAGE GLUCOSE BLD GHB EST-MCNC: 111 MG/DL
HBA1C MFR BLD: 5.5 % (ref 4.2–6.3)
HDLC SERPL-MCNC: 65 MG/DL (ref 40–60)
LDLC SERPL CALC-MCNC: 124 MG/DL (ref 0–100)
NONHDLC SERPL-MCNC: 145 MG/DL
TRIGL SERPL-MCNC: 105 MG/DL

## 2018-06-13 PROCEDURE — 80061 LIPID PANEL: CPT

## 2018-06-13 PROCEDURE — 36415 COLL VENOUS BLD VENIPUNCTURE: CPT

## 2018-06-13 PROCEDURE — 83036 HEMOGLOBIN GLYCOSYLATED A1C: CPT

## 2018-06-26 RX ORDER — LOSARTAN POTASSIUM AND HYDROCHLOROTHIAZIDE 25; 100 MG/1; MG/1
1 TABLET ORAL
COMMUNITY
Start: 2018-05-18 | End: 2018-06-27 | Stop reason: SDUPTHER

## 2018-06-27 ENCOUNTER — OFFICE VISIT (OUTPATIENT)
Dept: OBGYN CLINIC | Facility: MEDICAL CENTER | Age: 28
End: 2018-06-27
Payer: COMMERCIAL

## 2018-06-27 ENCOUNTER — OFFICE VISIT (OUTPATIENT)
Dept: CARDIOLOGY CLINIC | Facility: CLINIC | Age: 28
End: 2018-06-27
Payer: COMMERCIAL

## 2018-06-27 VITALS
HEART RATE: 78 BPM | WEIGHT: 236 LBS | HEIGHT: 64 IN | BODY MASS INDEX: 40.29 KG/M2 | SYSTOLIC BLOOD PRESSURE: 140 MMHG | DIASTOLIC BLOOD PRESSURE: 90 MMHG

## 2018-06-27 VITALS
DIASTOLIC BLOOD PRESSURE: 90 MMHG | HEIGHT: 64 IN | BODY MASS INDEX: 40.12 KG/M2 | WEIGHT: 235 LBS | SYSTOLIC BLOOD PRESSURE: 140 MMHG

## 2018-06-27 DIAGNOSIS — L70.0 ACNE VULGARIS: ICD-10-CM

## 2018-06-27 DIAGNOSIS — R00.2 PALPITATIONS: Primary | ICD-10-CM

## 2018-06-27 DIAGNOSIS — Z01.419 ENCOUNTER FOR GYNECOLOGICAL EXAMINATION WITHOUT ABNORMAL FINDING: Primary | ICD-10-CM

## 2018-06-27 DIAGNOSIS — Z12.4 PAP SMEAR FOR CERVICAL CANCER SCREENING: ICD-10-CM

## 2018-06-27 DIAGNOSIS — Z77.21 PERSONAL HISTORY OF EXPOSURE TO POTENTIALLY HAZARDOUS BODY FLUIDS: ICD-10-CM

## 2018-06-27 DIAGNOSIS — R07.89 CHEST PRESSURE: ICD-10-CM

## 2018-06-27 DIAGNOSIS — I10 HTN (HYPERTENSION), BENIGN: ICD-10-CM

## 2018-06-27 DIAGNOSIS — Z30.09 ENCOUNTER FOR OTHER GENERAL COUNSELING OR ADVICE ON CONTRACEPTION: ICD-10-CM

## 2018-06-27 PROBLEM — E66.09 CLASS 1 OBESITY DUE TO EXCESS CALORIES WITHOUT SERIOUS COMORBIDITY WITH BODY MASS INDEX (BMI) OF 34.0 TO 34.9 IN ADULT: Status: ACTIVE | Noted: 2017-06-01

## 2018-06-27 PROBLEM — R80.9 PROTEINURIA: Status: ACTIVE | Noted: 2017-06-01

## 2018-06-27 PROBLEM — K58.2 IRRITABLE BOWEL SYNDROME WITH BOTH CONSTIPATION AND DIARRHEA: Status: ACTIVE | Noted: 2017-09-21

## 2018-06-27 PROBLEM — Z86.2 HX OF LEUKOCYTOSIS: Status: ACTIVE | Noted: 2017-04-28

## 2018-06-27 PROBLEM — R07.9 CHEST PAIN AT REST: Status: ACTIVE | Noted: 2018-06-27

## 2018-06-27 PROCEDURE — 93000 ELECTROCARDIOGRAM COMPLETE: CPT | Performed by: INTERNAL MEDICINE

## 2018-06-27 PROCEDURE — 87591 N.GONORRHOEAE DNA AMP PROB: CPT | Performed by: NURSE PRACTITIONER

## 2018-06-27 PROCEDURE — 99244 OFF/OP CNSLTJ NEW/EST MOD 40: CPT | Performed by: INTERNAL MEDICINE

## 2018-06-27 PROCEDURE — 87624 HPV HI-RISK TYP POOLED RSLT: CPT | Performed by: NURSE PRACTITIONER

## 2018-06-27 PROCEDURE — 87491 CHLMYD TRACH DNA AMP PROBE: CPT | Performed by: NURSE PRACTITIONER

## 2018-06-27 PROCEDURE — G0145 SCR C/V CYTO,THINLAYER,RESCR: HCPCS | Performed by: PATHOLOGY

## 2018-06-27 PROCEDURE — 81025 URINE PREGNANCY TEST: CPT | Performed by: NURSE PRACTITIONER

## 2018-06-27 PROCEDURE — G0124 SCREEN C/V THIN LAYER BY MD: HCPCS | Performed by: PATHOLOGY

## 2018-06-27 PROCEDURE — 99385 PREV VISIT NEW AGE 18-39: CPT | Performed by: NURSE PRACTITIONER

## 2018-06-27 RX ORDER — LOSARTAN POTASSIUM 25 MG/1
25 TABLET ORAL DAILY
Qty: 30 TABLET | Refills: 3 | Status: SHIPPED | OUTPATIENT
Start: 2018-06-27 | End: 2018-08-15 | Stop reason: ALTCHOICE

## 2018-06-27 RX ORDER — DOXYCYCLINE HYCLATE 100 MG/1
100 CAPSULE ORAL DAILY
Qty: 30 CAPSULE | Refills: 2 | Status: SHIPPED | OUTPATIENT
Start: 2018-06-27 | End: 2018-07-18 | Stop reason: SINTOL

## 2018-06-27 RX ORDER — MULTIVITAMIN
1 CAPSULE ORAL DAILY
COMMUNITY
End: 2018-09-30

## 2018-06-27 RX ORDER — DIPHENHYDRAMINE HYDROCHLORIDE 25 MG/1
5000 TABLET ORAL 2 TIMES DAILY
COMMUNITY
End: 2018-09-30

## 2018-06-27 NOTE — PROGRESS NOTES
[facility]  ANNUAL GYNECOLOGIC EXAM  Jaspal Morgan 29 y o  SUBJECTIVE      HPI:  Jaspal Morgan is a 29 y o  G0 new patient female presenting today for annual GYN exam  Her last gynecologic exam was at [facility] last year  Irregular menses  Currently on drospirenone/EE OCP (Rx'd by another provider) but admits to not taking it consistently  Comorbidities include high BP, obesity, and family history of clotting disorder  Says that she herself was worked up for [address] and negative  Her mom and aunt both had blood clots at some point in time  Has been on OCPs for "years"  Just prescribed Losartan this AM at the cardiologist  Denies Avbelen Garcia however was recently seen in ER on  for chest pain (negative CXR, normal EKG, and normal d-dimer)  Possibly was given diagnosis of PCOS in the past, confusing  Has recently had 2 male sexual partners, requesting STD testing today  Unknown LMP  UPT today is negative  Currently going through a divorce  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  Nonsmoker  Denies sexual concerns  Has no breast, bowel, bladder, or vaginal concerns  She is trying to get back to the gym  Gynecologic History   Last pap smear: Date: 2017 and Result: NILM  History of abnormal pap smears: Yes  The patient denies history of sexually transmitted disease    Contraceptive method: oral contraceptives (estrogen/progesterone)  Dyspareunia: denies      Obstetric History   0  Desires conception in the next year: No    Health Maintenance  Gardasil Vaccination: discussed  Self-breast exams: yes  Tobacco cessation: N/A      The following portions of the patient's history were reviewed and updated as appropriate: allergies, current medications, past family history, past medical history, past social history, past surgical history and problem list       ROS  General ROS: negative for chills or fever  Breast ROS: negative for breast lumps  Respiratory ROS: no cough, shortness of breath, or wheezing  Cardiovascular ROS: no chest pain or dyspnea on exertion  Gastrointestinal ROS: no abdominal pain, change in bowel habits, or black or bloody stools  Genito-Urinary ROS: no dysuria, trouble voiding, or hematuria  Neurological ROS: negative      OBJECTIVE  Vitals:    06/27/18 1649   BP: 140/90   BP Location: Left arm   Patient Position: Sitting   Weight: 107 kg (235 lb)   Height: 5' 4" (1 626 m)       Physical Exam   Constitutional: She is oriented to person, place, and time  She appears well-developed and well-nourished  Obese body habitus   Genitourinary: Vagina normal and uterus normal  Pelvic exam was performed with patient supine  There is no rash, tenderness or lesion on the right labia  There is no rash, tenderness or lesion on the left labia  No erythema in the vagina  No vaginal discharge found  Right adnexum does not display mass and does not display tenderness  Left adnexum does not display mass and does not display tenderness  Cervix does not exhibit motion tenderness  Uterus is not tender  Genitourinary Comments: Exam limited by body habitus  HENT:   Head: Normocephalic and atraumatic  Pulmonary/Chest: Effort normal  Right breast exhibits no inverted nipple, no mass, no nipple discharge, no skin change and no tenderness  Left breast exhibits no inverted nipple, no mass, no nipple discharge, no skin change and no tenderness  Breasts are symmetrical    Nipples pierced  Abdominal: Soft  Normal appearance  Musculoskeletal: Normal range of motion  Lymphadenopathy:     She has no axillary adenopathy  Neurological: She is alert and oriented to person, place, and time  Skin: Skin is warm, dry and intact  Psychiatric: She has a normal mood and affect  Her behavior is normal    Vitals reviewed        ASSESSMENT  Normal exam  Morbid obesity  Poor candidate for COCs  Hypertension      Discussion/Counseling  At todays visit I discussed the importance of self-breast exams and awareness  We discussed the importance of exercise and healthy diet  I reviewed ASCCP guidelines for cervical cancer screening  Safe sexual practices were strongly encouraged to protect against sexually transmitted infections  We reviewed her reproductive life plan  All questions were answered to her apparent satisfaction  Risks and benefits of OCP use discussed in detail  The patient was assessed for relative and absolute contraindications to [address] use  Sayra Spencer discussion regarding the following factors making her a poor candidate for COCs: Hypertension, Obesity, and strong family history of blood clots  She says that she wants to be off of OCPs anyway because she has been on them for so long  I strongly urged her to consider other options - we reviewed available contraception options, along with the risks, benefits, and efficacy profile of each method in detail  This review included discussion of the IUD (Mirena, Paragard, Superior, Josefina Raymundo), injectable (Depo Provera), oral contraceptives (POP), barrier methods (condoms), male/female sterilization, and abstinence  All questions were answered to her satisfaction  At this point she wants to stop OCPs and use condoms as birth control  She is worried about her acne though and coming off of hormonal OCPs  Most interested in DMPA once she starts exercising again  PLAN  1 - Condoms 100% of the time! 2 - See above discussion for acne plan  Will try doxycycline 100mg PO daily x3mos   Encouraged her to see dermatologist   3 - Return annually for routine GYN exams    Problem List Items Addressed This Visit     None      Visit Diagnoses     Encounter for gynecological examination without abnormal finding    -  Primary    Relevant Orders    Liquid-based pap, screening    Chlamydia/GC amplified DNA by PCR    Personal history of exposure to potentially hazardous body fluids        Relevant Orders    Liquid-based pap, screening    Chlamydia/GC amplified DNA by PCR    Pap smear for cervical cancer screening        Relevant Orders    Liquid-based pap, screening    Encounter for other general counseling or advice on contraception        Acne vulgaris        Relevant Medications    doxycycline hyclate (VIBRAMYCIN) 100 mg capsule        All questions were answered & Ronel Hodges expressed understanding        Yogi Weston

## 2018-06-27 NOTE — PROGRESS NOTES
Outpatient Cardiology Consult Note - Felipa Thorpe 29 y o  female MRN: [id number]    @ Encounter: [id number]        There are no active problems to display for this patient  Assessment:   # Chest pain- at rest,  More due to anxiety  # HTN- use to be on losartan/ hct 100/25- but she stopped everything, then tried bystolic but then stopped it  # obesity- recommend diet and exercise, has lost 85 lbs in past    Today's Plan: Will start losartan 25 mg daily (has been on 100 mg in past)  Echo  Weight loss    HPI:       30 yo female who was seen in the ED 5/29/18 for chest pain  She also experienced SOB  Family hx of FActor V leyden and she was taking birth control  CXR showed NAD  EKG was normal  D dimer was normal  Has been on losartan/hctz and stopped on her own, then put on bystolic but then stopped on her own  She has been having chest pain constantly, no aggrevating or alleviating factors  Lost 85 lbs but gained 50 lbs back as stopped eating healthy   Past Medical History:   Diagnosis Date    Gallstone     Genital herpes     Hypertension        Review of Systems - Negative except chest pain and dyspnea as described above  No edema  Allergies   Allergen Reactions    Erythromycin Shortness Of Breath     Chest pain    Levonorgestrel-Ethinyl Estrad Hypertension     Patient states she was hospitalized after taking this for chest pain and Hypertension   Erythromycin Base     Medical Tape Rash           Current Outpatient Prescriptions:     Biotin (BIOTIN 5000) 5 MG CAPS, Take 5,000 mg by mouth 2 (two) times a day, Disp: , Rfl:     drospirenone-ethinyl estradiol (TATY) 3-0 02 MG per tablet, Take 1 tablet by mouth, Disp: , Rfl:     Multiple Vitamin (MULTIVITAMIN) capsule, Take 1 capsule by mouth daily, Disp: , Rfl:     losartan (COZAAR) 25 mg tablet, Take 100 mg by mouth daily  , Disp: , Rfl:     losartan-hydrochlorothiazide (HYZAAR) 100-25 MG per tablet, Take 1 tablet by mouth, Disp: , Rfl: Social History     Social History    Marital status: Single     Spouse name: N/A    Number of children: N/A    Years of education: N/A     Occupational History    Not on file  Social History Main Topics    Smoking status: Never Smoker    Smokeless tobacco: Never Used    Alcohol use Yes      Comment: soc    Drug use: No    Sexual activity: Not on file     Other Topics Concern    Not on file     Social History Narrative    No narrative on file       No family history on file  Physical Exam:    Vitals: Blood pressure 140/90, pulse 78, height 5' 4" (1 626 m), weight 107 kg (236 lb)  , Body mass index is 40 51 kg/m² ,   Wt Readings from Last 3 Encounters:   06/27/18 107 kg (236 lb)   05/29/18 104 kg (230 lb)   04/11/18 98 7 kg (217 lb 9 6 oz)       Physical Exam:  Vitals:    06/27/18 1425   BP: 140/90   BP Location: Left arm   Patient Position: Sitting   Cuff Size: Standard   Pulse: 78   Weight: 107 kg (236 lb)   Height: 5' 4" (1 626 m)       GEN: Minna Conn appears well, alert and oriented x 3, pleasant and cooperative   HEENT: pupils equal, round, and reactive to light; extraocular muscles intact  NECK: supple, no carotid bruits   HEART: regular rhythm, normal S1 and S2, no murmurs, clicks, gallops or rubs, JVP is    LUNGS: clear to auscultation bilaterally; no wheezes, rales, or rhonchi   ABDOMEN: normal bowel sounds, soft, no tenderness, no distention  EXTREMITIES: peripheral pulses normal; no clubbing, cyanosis, or edema  NEURO: no focal findings   SKIN: normal without suspicious lesions on exposed skin    Labs & Results:    Lab Results   Component Value Date    WBC 8 36 05/29/2018    HGB 15 0 05/29/2018    HCT 45 2 05/29/2018    MCV 89 05/29/2018     05/29/2018     Lab Results   Component Value Date    GLUCOSE 87 05/29/2018    CALCIUM 8 8 05/29/2018     05/29/2018    K 3 2 (L) 05/29/2018    CO2 28 05/29/2018     05/29/2018    BUN 16 05/29/2018    CREATININE 0 74 05/29/2018     No results found for: BNP   Lab Results   Component Value Date    CHOL 210 (H) 06/13/2018     Lab Results   Component Value Date    HDL 65 (H) 06/13/2018     Lab Results   Component Value Date    LDLCALC 124 (H) 06/13/2018     Lab Results   Component Value Date    TRIG 105 06/13/2018     No components found for: CHOLHDL      Echocardiogram  LVEF:   LVIDd:  RV:  MR:  PASP:  RVOT:   Other:      EKG personally reviewed by Dale Cole DO  Counseling / Coordination of Care  Total floor / unit time spent today 40 minutes  Greater than 50% of total time was spent with the patient and / or family counseling and / or coordination of care  A description of the counseling / coordination of care: 25 min  Thank you for the opportunity to participate in the care of this patient  Dale MOORE    Director of Advanced Heart Failure Program  Medical Director of [address] [place] [address]

## 2018-06-28 LAB
CHLAMYDIA DNA CVX QL NAA+PROBE: NORMAL
N GONORRHOEA DNA GENITAL QL NAA+PROBE: NORMAL

## 2018-07-09 LAB — HPV RRNA GENITAL QL NAA+PROBE: ABNORMAL

## 2018-07-10 LAB
LAB AP GYN PRIMARY INTERPRETATION: NORMAL
Lab: NORMAL
PATH INTERP SPEC-IMP: NORMAL

## 2018-07-11 ENCOUNTER — TELEPHONE (OUTPATIENT)
Dept: OBGYN CLINIC | Facility: MEDICAL CENTER | Age: 28
End: 2018-07-11

## 2018-07-11 ENCOUNTER — TELEPHONE (OUTPATIENT)
Dept: OBGYN CLINIC | Facility: CLINIC | Age: 28
End: 2018-07-11

## 2018-07-11 DIAGNOSIS — R87.610 ASCUS WITH POSITIVE HIGH RISK HPV CERVICAL: Primary | ICD-10-CM

## 2018-07-11 DIAGNOSIS — R87.810 ASCUS WITH POSITIVE HIGH RISK HPV CERVICAL: Primary | ICD-10-CM

## 2018-07-11 DIAGNOSIS — N89.8 VAGINAL ITCHING: Primary | ICD-10-CM

## 2018-07-11 NOTE — TELEPHONE ENCOUNTER
Medical student Yee Palmer returned G87 returned patient's page - gave the information about pt sx - she then conferred with G87 - he advised to prescribe Terconazole 7 - if sx do not resolve itself 48-72 hrs after last dose to call back  Spoke with [facility] - she agree's with the plan

## 2018-07-11 NOTE — TELEPHONE ENCOUNTER
Patient called back - inquiring why her RX was not at the pharmacy - did advise still waiting response from provider - was addemit about wanting an Rx evelin Croft Text G87 - await response

## 2018-07-11 NOTE — TELEPHONE ENCOUNTER
Spoke with pt - symptoms started 2 days ago - she has internal itching - near vulva  No odor, burning or discharge  Just irritation and itching  Had intercourse yesterday - condom was used - states it felt worse after so thinks she may be allergic to the condoms - was latex  No OTC product used  She would like an RX called into the pharmacy tonight - preferably a cream  Please advise  Thanks!

## 2018-07-11 NOTE — TELEPHONE ENCOUNTER
Telephone call to [facility]  Discussed pap smear results (ASCUS, hrHPV+, neg 16/18)  Reviewed ASCCP guideline for colposcopy  Discussed process of colpo  Discussed HPV  All questions answered  She is at work right now and prefers to have someone call her later to set up colpo appointment rather than be transferred to clerical staff at this time  I will task the clerical staff to reach out to [facility]  Patient expressed understanding    Asa Landa

## 2018-07-12 ENCOUNTER — TELEPHONE (OUTPATIENT)
Dept: OBGYN CLINIC | Facility: CLINIC | Age: 28
End: 2018-07-12

## 2018-07-12 ENCOUNTER — OFFICE VISIT (OUTPATIENT)
Dept: OBGYN CLINIC | Facility: MEDICAL CENTER | Age: 28
End: 2018-07-12
Payer: COMMERCIAL

## 2018-07-12 VITALS — BODY MASS INDEX: 40.34 KG/M2 | SYSTOLIC BLOOD PRESSURE: 124 MMHG | WEIGHT: 235 LBS | DIASTOLIC BLOOD PRESSURE: 90 MMHG

## 2018-07-12 DIAGNOSIS — B37.3 YEAST VAGINITIS: Primary | ICD-10-CM

## 2018-07-12 PROCEDURE — 87510 GARDNER VAG DNA DIR PROBE: CPT | Performed by: NURSE PRACTITIONER

## 2018-07-12 PROCEDURE — 87660 TRICHOMONAS VAGIN DIR PROBE: CPT | Performed by: NURSE PRACTITIONER

## 2018-07-12 PROCEDURE — 87480 CANDIDA DNA DIR PROBE: CPT | Performed by: NURSE PRACTITIONER

## 2018-07-12 PROCEDURE — 99213 OFFICE O/P EST LOW 20 MIN: CPT | Performed by: NURSE PRACTITIONER

## 2018-07-12 RX ORDER — FLUCONAZOLE 150 MG/1
TABLET ORAL
Qty: 2 TABLET | Refills: 0 | Status: SHIPPED | OUTPATIENT
Start: 2018-07-12 | End: 2018-07-15

## 2018-07-12 RX ORDER — NYSTATIN AND TRIAMCINOLONE ACETONIDE 100000; 1 [USP'U]/G; MG/G
OINTMENT TOPICAL 2 TIMES DAILY
Qty: 30 G | Refills: 0 | Status: SHIPPED | OUTPATIENT
Start: 2018-07-12 | End: 2018-08-14

## 2018-07-12 NOTE — TELEPHONE ENCOUNTER
Patient returned call - advised her per Stephy Bleak that the cream should resolve her sx and if she wanted the diflucan also she would need an appointment  Patient stated she could make one due to work  Advised her the cream should resolve sx - apply cream for 7 days - if her sx do resolve it self 48-72 hrs after last dose to call back

## 2018-07-12 NOTE — TELEPHONE ENCOUNTER
Patient called and would like a prescription for Diflucan to use with the vaginal cream that was ordered yesterday    Please call script into the [facility] in [address] [address] Please call patient back when called in at [phone]

## 2018-07-12 NOTE — PROGRESS NOTES
[facility]  Shalom Kramer 29 y o  SUBJECTIVE    HPI: Shalom Kramer is a 29 y o  female presenting today for acute office visit  Her chief complaint is vaginal itching and burning  She called the office prior this and Terazol cream was Rx'd, she thinks it is getting worse and has only used Terazol for 1 night  Unsure if it is from condoms - so stopped using condoms and had [facility]  LMP 7/2/18  Last visit we started her on Doxycycline for acne - she has taken 1 week of this and stopped now  No vaginal discharge  No dysuria  Denies fever or chills  At last visit, we mutually agreed to stop OCPs (comorbidities include hypertension, obesity, and family history of clotting disorder) in favor of condoms (she is averse to LARC) until DMPA was started  The following portions of the patient's history were reviewed and updated as appropriate: allergies, current medications, past medical history, past surgical history and problem list       ROS  General: negative for chills or fever   Respiratory: no cough, shortness of breath, or wheezing  Cardiovascular: no chest pain or dyspnea on exertion  Gastrointestinal: no abdominal pain, change in bowel habits, or black or bloody stools  Urinary: no urinary symptoms  Genitourinary: As Noted in HPI  Neurological: negative      OBJECTIVE    /90 (BP Location: Left arm, Patient Position: Sitting)   Wt 107 kg (235 lb)   LMP 07/02/2018   Breastfeeding? No   BMI 40 34 kg/m²        Physical Exam   Constitutional: She is oriented to person, place, and time  She appears well-developed and well-nourished  Genitourinary: Uterus normal  Pelvic exam was performed with patient supine  There is no rash or tenderness on the right labia  There is no rash or tenderness on the left labia  There is erythema in the vagina  Vaginal discharge found  Right adnexum does not display mass and does not display tenderness   Left adnexum does not display mass and does not display tenderness  Cervix does not exhibit motion tenderness  Genitourinary Comments: There is focal erythema of the introitus extending into vagina  Within the vaginal vault, there is some chunky white discharge  HENT:   Head: Normocephalic and atraumatic  Pulmonary/Chest: Effort normal    Neurological: She is alert and oriented to person, place, and time  Skin: Skin is warm, dry and intact  Psychiatric: She has a normal mood and affect  Her behavior is normal    Vitals reviewed  Lab Results:   No visits with results within 1 Day(s) from this visit  Latest known visit with results is:   Office Visit on 06/27/2018   Component Date Value    Case Report 06/27/2018                      Value:Gynecologic Cytology Report                       Case: [id number]                                  Authorizing Provider:  SHERMAN Vanegas     Collected:           06/27/2018 1747              Ordering Location:     [facility]      Received:            06/27/2018 [address] [place] [address]                                     Gynecology [facility]                                                                          First Screen:          Rudi Emerson, CT                                                       Pathologist:           Fran Munson MD                                                        Specimen:    LIQUID-BASED PAP, SCREENING, Cervix, Endocervical                                          Primary Interpretation 06/27/2018 Epithelial cell abnormality     Interpretation 06/27/2018 Atypical squamous cells of undetermined significance     Specimen Adequacy 06/27/2018 Satisfactory for evaluation  Endocervical/transformation zone component present       High Risk HPV Result 06/27/2018                      Value:HPV, High Risk: HPV POS, HPV16 NEG, HPV18 NEG      Other High Risk HPV Positive, HPV 16 Negative, HPV 18 Negative  Specimen is positive for the DNA of any one of, or combination of the following high risk HPV types: 58,75,92,71,84,02,58,65,06,23,01,28  HPV types 16 and 18 DNA were undetectable or below the pre-set threshold  Roches FDA approved Long 4800 is utilized with strict adherence to the s instruction  manual to test for the presence of High-Risk HPV DNA, as well as HPV 16 and HPV 18  This instrument  has been validated by our laboratory and/or by the   A negative result does not preclude the presence of HPV infection because results depend on adequate  specimen collection, absence of inhibitors and sufficient DNA to be detected  Additionally, HPV negative  results are not intended to prevent women from proceeding to colposcopy if clinically warranted  Positive HPV test results indicate the presence of any one or more of the high risk types, but                           since patients  are often co-infected with low-risk types it does not rule out the presence of low-risk types in patients  with mixed infections   Additional Information 06/27/2018                      Value: This result contains rich text formatting which cannot be displayed here   N gonorrhoeae, DNA Probe 06/27/2018 N  gonorrhoeae Amplified DNA Negative     Chlamydia, DNA Probe 06/27/2018 C  trachomatis Amplified DNA Negative     HPV, High Risk 06/27/2018 HPV POS, HPV16 NEG, HPV18 NEG*          ASSESSMENT  Vaginitis  DDx includes contact dermatitis vs vaginal candidiasis (likely from doxycycline use)      PLAN  1  Return for colposcopy  2  At this point, I think that the benefits of preventing an unplanned pregnancy likely outweigh the risks posed by her medical comorbidities for being on an OCP  She wants to wait to restart them and will try a different condom brand to prevent pregnancy  I reviewed her risk for unplanned pregnancy    3  Diflucan 150mg PO x1, repeat 72 hours later  4  Stop Terazol in favor of Nystatin/Triamcinolone cream  5  Affirm DNA PCR  6  Return as clinically indicated        All questions were answered & Lola expressed understanding      Arlene Florian

## 2018-07-12 NOTE — TELEPHONE ENCOUNTER
Pt would like a call regarding what she should be making her next appt for per [facility],    She saw [facility] 6/27/18 as a New pt Yearly,  In [address] notes pt should return for a 3 mth fu the end of Sept,  Pt wants to know for WHAT,  pls call pt & advise, thanks

## 2018-07-12 NOTE — TELEPHONE ENCOUNTER
No - she needs to be seen for an office visit if that is the case  The cream should resolve her symptoms if they are related to yeast  Thanks

## 2018-07-12 NOTE — TELEPHONE ENCOUNTER
----- Message from [facility] sent at 7/12/2018 12:16 PM EDT -----  Has this patient's colposcopy been set up yet?

## 2018-07-12 NOTE — TELEPHONE ENCOUNTER
Spoke with Pt today via phone call  Pt informed that she must be evaluated by provider before additional medication is prescribed to treat vulvar symptoms  Pt scheduled for an appointment today, 7/12/18 with Marichuy Little at 3:00 pm ([place] office), instructed to arrive by 2:45 pm   Reiterated to Pt that if she has any questions/concerns, to contact office

## 2018-07-12 NOTE — TELEPHONE ENCOUNTER
Patient would like an RX of Diflucan also, states the cream will not be able to help resolve her sx  Ok to rx? Please advise  Thanks!

## 2018-07-13 PROBLEM — B37.3 YEAST VAGINITIS: Status: ACTIVE | Noted: 2018-07-13

## 2018-07-13 PROBLEM — B37.31 YEAST VAGINITIS: Status: ACTIVE | Noted: 2018-07-13

## 2018-07-14 LAB
CANDIDA RRNA VAG QL PROBE: POSITIVE
G VAGINALIS RRNA GENITAL QL PROBE: POSITIVE
T VAGINALIS RRNA GENITAL QL PROBE: NEGATIVE

## 2018-07-16 ENCOUNTER — TELEPHONE (OUTPATIENT)
Dept: OBGYN CLINIC | Facility: CLINIC | Age: 28
End: 2018-07-16

## 2018-07-16 NOTE — TELEPHONE ENCOUNTER
[facility] is prov & prescribed diflucan for pt Thurs 7/12/18, This is NOT helping,  pls call pt to advise  thanks

## 2018-07-16 NOTE — TELEPHONE ENCOUNTER
Pt used Terazol 1 night - then had ov  Used Diflucan Thurs and Sun  Now has no dsch but is irritated externally  Is using clobetesol on outside  I suggested terazol on outside and keep dry   Has ov on Wed so will be rechecked

## 2018-07-17 DIAGNOSIS — N76.0 BACTERIAL VAGINITIS: ICD-10-CM

## 2018-07-17 DIAGNOSIS — N76.0 BACTERIAL VAGINOSIS: Primary | ICD-10-CM

## 2018-07-17 DIAGNOSIS — B96.89 BACTERIAL VAGINITIS: ICD-10-CM

## 2018-07-17 DIAGNOSIS — B96.89 BACTERIAL VAGINOSIS: Primary | ICD-10-CM

## 2018-07-17 RX ORDER — METRONIDAZOLE 500 MG/1
TABLET ORAL
Qty: 14 TABLET | Refills: 0 | Status: SHIPPED | OUTPATIENT
Start: 2018-07-17 | End: 2018-07-24

## 2018-07-17 NOTE — TELEPHONE ENCOUNTER
----- Message from [facility] sent at 7/17/2018  7:10 AM EDT -----  Please call the patient regarding her abnormal result  She is already being treated for yeast infection  Her Affirm shows that she also has BV  She should be done with the 2-dose Diflucan by now - please Rx metronidazole 500mg PO BID x7d and review antabuse guidelines with this medication  Thank you

## 2018-07-17 NOTE — PROGRESS NOTES
Please call the patient regarding her abnormal result  She is already being treated for yeast infection  Her Affirm shows that she also has BV  She should be done with the 2-dose Diflucan by now - please Rx metronidazole 500mg PO BID x7d and review antabuse guidelines with this medication  Thank you

## 2018-07-17 NOTE — TELEPHONE ENCOUNTER
Patient is aware fo results and that we are Priyanka Sat a prescription to her pharmacy  Gave directions on use and cautions  Rx in pt chart  Please sign  Thanks!

## 2018-07-18 ENCOUNTER — PROCEDURE VISIT (OUTPATIENT)
Dept: OBGYN CLINIC | Facility: MEDICAL CENTER | Age: 28
End: 2018-07-18
Payer: COMMERCIAL

## 2018-07-18 VITALS — WEIGHT: 236 LBS | BODY MASS INDEX: 40.51 KG/M2 | DIASTOLIC BLOOD PRESSURE: 86 MMHG | SYSTOLIC BLOOD PRESSURE: 120 MMHG

## 2018-07-18 DIAGNOSIS — R87.610 ASCUS WITH POSITIVE HIGH RISK HPV CERVICAL: Primary | ICD-10-CM

## 2018-07-18 DIAGNOSIS — R87.810 ASCUS WITH POSITIVE HIGH RISK HPV CERVICAL: Primary | ICD-10-CM

## 2018-07-18 LAB — SL AMB POCT URINE HCG: NEGATIVE

## 2018-07-18 PROCEDURE — 88305 TISSUE EXAM BY PATHOLOGIST: CPT | Performed by: PATHOLOGY

## 2018-07-18 PROCEDURE — 99212 OFFICE O/P EST SF 10 MIN: CPT | Performed by: NURSE PRACTITIONER

## 2018-07-18 PROCEDURE — 81025 URINE PREGNANCY TEST: CPT | Performed by: NURSE PRACTITIONER

## 2018-07-18 PROCEDURE — 57455 BIOPSY OF CERVIX W/SCOPE: CPT | Performed by: NURSE PRACTITIONER

## 2018-07-18 NOTE — PROGRESS NOTES
COLPOSCOPY    Indications: ASCUS cervical cytology, hrHPV(+), neg 16/18  History of Prior Cervical Treatments: None    31yo G0 obese female, nonsmoker, with no prior history of cervical dysplasia here for colposcopy  I did ask if she could come another day as she is currently being treated for Bacterial Vaginosis (Flagyl day 2 of 7) however she cannot due to work schedule - risk of losing her to follow-up, will proceed with colposcopy today  Preprocedure   Consent obtained: Verbal & Written  Procedural Risks Discussed: YES  Patient questions answered: YES  Patient expresses understanding and wants to proceed: YES  Allergy to Iodine? NO    UPT: Negative    PROCEDURE    Performed by: SHERMAN Vicente   Procedure: Colposcopy, Cervical Biopsy    Cervix fully visualized: YES  Cervix grossly normal: YES  Leukoplakia? NO  SCJ entirely visualized? YES, WITH MANIPULATION  Green light filter used? YES  Abnormal Vessel Patterns? NO    Copious amounts of Diluted Acetic Acid (5%) applied to cervix and allowed time to take effect  Acetowhite Epithelium: YES  If Yes:  · Color: Opaque  · Location: 8 o'clock  · Margins: Indistinct  · Surface: Flat  # of Biopsies & location:  8 o'clock    Physical Exam   Genitourinary:           Biopsy instrument: Baby Win Win Slots  Exam: Satisfactory   Lugols Iodine used? YES  ECC taken? NO - patient is not currently using contraception despite extensive counseling (see previous notes)  LMP was 7/2  During consent process, I explained to her that ECC is contraindicated in pregnant females and since we cannot reliably rule out pregnancy at this time despite negative UPT, I will avoid ECC at this time  Hemostasis of biopsy sites achieved via: Monsels solution & pressure    Postprocedure  Findings: 1 area of acetowhite epithelium  Specimens to Pathology: YES  Impression: LGSIL or less  Patient tolerance of procedure:   Tolerated without difficulty  Postop instructions reviewed: Stan Kyle 028 Vamsi Roberson

## 2018-07-20 ENCOUNTER — TELEPHONE (OUTPATIENT)
Dept: OBGYN CLINIC | Facility: CLINIC | Age: 28
End: 2018-07-20

## 2018-07-20 NOTE — TELEPHONE ENCOUNTER
CI7 is prov,  Pt saw results on my chart for colposcopy,  She has questions & would like a call,  thanks

## 2018-07-20 NOTE — TELEPHONE ENCOUNTER
Patient returned call - explained to pt that we don't have the results - only able to see the order form for it  Once we do have the results and they are reviewed by the provider, she will be notified

## 2018-07-25 ENCOUNTER — TELEPHONE (OUTPATIENT)
Dept: OBGYN CLINIC | Facility: MEDICAL CENTER | Age: 28
End: 2018-07-25

## 2018-07-25 ENCOUNTER — TELEPHONE (OUTPATIENT)
Dept: OBGYN CLINIC | Facility: CLINIC | Age: 28
End: 2018-07-25

## 2018-07-25 DIAGNOSIS — N87.0 DYSPLASIA OF CERVIX, LOW GRADE (CIN 1): Primary | ICD-10-CM

## 2018-07-25 NOTE — TELEPHONE ENCOUNTER
I will call Rodolfo Richardson and address this because I also have her biopsy results to review with her - the phones are not in service right now, I will try again later

## 2018-07-25 NOTE — TELEPHONE ENCOUNTER
Pt had intercourse the night of 7/23 and 7/24 and both times she felt irritated and itchy - inside and outside of vagina   asymptomatic  What to do? Thank you  No dsch, no odor

## 2018-07-25 NOTE — TELEPHONE ENCOUNTER
Pt finished the flagyl and diflucan for infection and has abstained from intercourse, but when she had intercourse again it flared up  Is asking if her  could have it since he has not been treated  H Lory redied pt to call in if not resolved

## 2018-07-27 ENCOUNTER — TELEPHONE (OUTPATIENT)
Dept: OBGYN CLINIC | Facility: MEDICAL CENTER | Age: 28
End: 2018-07-27

## 2018-07-27 NOTE — TELEPHONE ENCOUNTER
Left discrete voicemail for Jordan Dawkins to call me back at her convenience to discuss results  [address] office phone # left on voicemail    Becky Coffey

## 2018-07-27 NOTE — TELEPHONE ENCOUNTER
Spoke with Frantz Dillon via telephone  Reviewed cervical biopsy result: SYLVIE-1  Per ASCCP, management of this result is pap smear with HPV co-testing at 12 months  She agrees to do so  Regarding vaginitis, it is improving  Advised plain water for washing, pat dry, warm soaks and petroleum jelly if needed  Employ conservative vulvar and vaginal hygiene practices  Call for discharge or odor  Just got her period      Nela Casillas

## 2018-08-14 ENCOUNTER — OFFICE VISIT (OUTPATIENT)
Dept: URGENT CARE | Facility: MEDICAL CENTER | Age: 28
End: 2018-08-14
Payer: COMMERCIAL

## 2018-08-14 VITALS
SYSTOLIC BLOOD PRESSURE: 133 MMHG | WEIGHT: 240 LBS | RESPIRATION RATE: 18 BRPM | HEIGHT: 61 IN | HEART RATE: 85 BPM | OXYGEN SATURATION: 99 % | TEMPERATURE: 98.8 F | BODY MASS INDEX: 45.31 KG/M2 | DIASTOLIC BLOOD PRESSURE: 80 MMHG

## 2018-08-14 DIAGNOSIS — R11.0 NAUSEA: Primary | ICD-10-CM

## 2018-08-14 DIAGNOSIS — R10.9 ABDOMINAL PAIN, UNSPECIFIED ABDOMINAL LOCATION: ICD-10-CM

## 2018-08-14 PROCEDURE — 99213 OFFICE O/P EST LOW 20 MIN: CPT | Performed by: PHYSICIAN ASSISTANT

## 2018-08-14 PROCEDURE — S9088 SERVICES PROVIDED IN URGENT: HCPCS | Performed by: PHYSICIAN ASSISTANT

## 2018-08-14 RX ORDER — ONDANSETRON 4 MG/1
4 TABLET, FILM COATED ORAL EVERY 8 HOURS PRN
Qty: 20 TABLET | Refills: 0 | Status: SHIPPED | OUTPATIENT
Start: 2018-08-14 | End: 2018-09-30

## 2018-08-14 RX ORDER — RANITIDINE 150 MG/1
150 TABLET ORAL 2 TIMES DAILY
Qty: 30 TABLET | Refills: 0 | Status: SHIPPED | OUTPATIENT
Start: 2018-08-14 | End: 2018-09-30

## 2018-08-14 NOTE — LETTER
August 14, 2018     Patient: Nelly Jackman   YOB: 1990   Date of Visit: 8/14/2018       To Whom It May Concern: It is my medical opinion that Nelly Jackman may return to work on 08/16/2018  If you have any questions or concerns, please don't hesitate to call           Sincerely,        London Malone PA-C    CC: No Recipients

## 2018-08-14 NOTE — PROGRESS NOTES
[facility]      NAME: Robert Christy is a 29 y o  female  : 1990    MRN: [id number]  DATE: 2018  TIME: 10:06 AM    Assessment and Plan   Nausea [R11 0]  1  Nausea  ondansetron (ZOFRAN) 4 mg tablet   2  Abdominal pain, unspecified abdominal location  ranitidine (ZANTAC) 150 mg tablet       Patient Instructions       Advised patient that etiology of symptoms at this point could not be determined  Patient required further workup if symptoms persist   I provided patient with a list of PCPs in the area  If symptoms worsen, go to emergency room right away without delay  Chief Complaint     Chief Complaint   Patient presents with    Fatigue     Patient c/o fatigue, nausea, diarrhea and headach x 1 week          History of Present Illness   Robert Christy presents to the clinic c/o     75-year-old female presents for evaluation of 1 week of nausea, diarrhea, generalized abdominal pain, fatigue  She denies any vomiting episodes  She denies any sick contacts at home with similar symptoms  She denies any fever, chills, respiratory distress  She has had paperwork for Lyme testing, but has not had that filled yet  She states the symptoms are made worse when she eats  She also states that she has been eating out quite frequently for the last 1 week and believes this might be associated with that  She denies any blood in her stool, change color stool  She denies any burning with urination  Review of Systems   Review of Systems   Constitutional: Positive for fatigue  Negative for fever  Gastrointestinal: Positive for abdominal pain, diarrhea and nausea  Negative for vomiting           Current Medications     Long-Term Prescriptions   Medication Sig Dispense Refill    Biotin (BIOTIN 5000) 5 MG CAPS Take 5,000 mg by mouth 2 (two) times a day      losartan (COZAAR) 25 mg tablet Take 1 tablet (25 mg total) by mouth daily 30 tablet 3    Multiple Vitamin (MULTIVITAMIN) capsule Take 1 capsule by mouth daily      ondansetron (ZOFRAN) 4 mg tablet Take 1 tablet (4 mg total) by mouth every 8 (eight) hours as needed for nausea or vomiting 20 tablet 0    ranitidine (ZANTAC) 150 mg tablet Take 1 tablet (150 mg total) by mouth 2 (two) times a day for 15 days 30 tablet 0       Current Allergies     Allergies as of 08/14/2018 - Reviewed 08/14/2018   Allergen Reaction Noted    Erythromycin Shortness Of Breath 10/26/2015    Levonorgestrel-ethinyl estrad Hypertension 05/22/2017    Erythromycin base  05/09/2016    Penicillins Other (See Comments) 10/26/2015    Medical tape Rash 04/28/2017            The following portions of the patient's history were reviewed and updated as appropriate: allergies, current medications, past family history, past medical history, past social history, past surgical history and problem list     HISTORICAL INFO:  Past Medical History:   Diagnosis Date    Gallstone     Genital herpes     Hypertension      No past surgical history on file  Objective   /80   Pulse 85   Temp 98 8 °F (37 1 °C)   Resp 18   Ht 5' 1" (1 549 m)   Wt 109 kg (240 lb)   SpO2 99%   BMI 45 35 kg/m²        Physical Exam     Physical Exam   Constitutional: She appears well-developed and well-nourished  No distress  HENT:   Head: Normocephalic and atraumatic  Cardiovascular: Normal rate, regular rhythm and normal heart sounds  Pulmonary/Chest: Effort normal and breath sounds normal  No respiratory distress  She has no wheezes  She has no rales  Abdominal: Soft  Bowel sounds are normal  There is no rebound and no guarding  Skin: Skin is warm and dry  She is not diaphoretic  Nursing note and vitals reviewed  M*Modal software was used to dictate this note  It may contain errors with dictating incorrect words/spelling  Please contact provider directly for any questions

## 2018-08-14 NOTE — PATIENT INSTRUCTIONS
Acute Headache   AMBULATORY CARE:   An acute headache  is pain or discomfort that starts suddenly and gets worse quickly  You may have an acute headache only when you feel stress or eat certain foods  Other acute headache pain can happen every day, and sometimes several times a day  The cause of an acute headache may not be known  It may be triggered by stress, fatigue, hormones, food, or trauma  Common types of acute headache:   · Tension headache  is the most common type of headache  These headaches typically occur in the late afternoon and go away by evening  The pain is usually mild or moderate  You may have problems tolerating bright light or loud noise  The pain is usually across the forehead or in the back of the head, often only on one side  These headaches may occur every day  · Migraine headaches  cause moderate or severe pain  The headache generally lasts from 1 to 3 days and tends to come back  Pain is usually on only one side, but it may change sides  Migraines often occur in the temple, the back of the head, or behind the eye  The pain may throb or be sharp and steady  · A migraine with aura  means you see or feel something before a migraine  You may see a small spot surrounded by bright zigzag lines  Other signs or symptoms may follow the aura  · Cluster headache  pain is usually only on one side  It often causes severe pain, and can last for 30 minutes to 2 hours  These headaches may occur 1 or 2 times each day, more often at night  The pain may wake you  Seek care immediately if:   · You have severe pain  · You have numbness or weakness on one side of your face or body  · You have a headache that occurs after a blow to the head, a fall, or other trauma  · You have a headache, are forgetful or confused, or have trouble speaking  · You have a headache, stiff neck, and a fever  Contact your healthcare provider if:   · You have a constant headache and are vomiting      · You have a headache each day that does not get better, even after treatment  · You have changes in your headaches, or new symptoms that occur when you have a headache  · You have questions or concerns about your condition or care  Treatment:   · Medicine  may be given to decrease pain  The medicine your healthcare provider recommends will depend on the kind of headaches you have  You will need to take prescription headache medicines as directed to prevent a problem called rebound headache  These headaches happen with regular use of pain relievers for headache disorders  NSAIDs or acetaminophen may help some kinds of headaches  · Biofeedback  may help you learn how to change stress reactions  For example, you learn to slow your heart rate when you become upset  You may also learn to prevent certain headaches by combining heat with relaxation  · Cognitive behavior therapy,  or stress management, may be used with other therapies to prevent headaches  Manage your symptoms:   · Apply heat or ice  on the headache area  Use a heat or ice pack  For an ice pack, you can also put crushed ice in a plastic bag  Cover the pack or bag with a towel before you apply it to your skin  Ice and heat both help decrease pain, and heat helps decrease muscle spasms  Apply heat for 20 to 30 minutes every 2 hours  Apply ice for 15 to 20 minutes every hour  Apply heat or ice for as long and for as many days as directed  You may alternate heat and ice  · Relax your muscles  Lie down in a comfortable position and close your eyes  Relax your muscles slowly  Start at your toes and work your way up your body  · Keep a record of your headaches  Write down when your headaches start and stop  Include your symptoms and what you were doing when the headache began  Record what you ate or drank for 24 hours before the headache started  Describe the pain and where it hurts   Keep track of what you did to treat your headache and if it worked  Prevent an acute headache:   · Avoid anything that triggers an acute headache  Examples include exposure to chemicals, going to high altitude, or not getting enough sleep  Create a regular sleep routine  Go to sleep at the same time and wake up at the same time each day  Do not use electronic devices before bedtime  These may trigger a headache or prevent you from sleeping well  · Do not smoke  Nicotine and other chemicals in cigarettes and cigars can trigger an acute headache or make it worse  Ask your healthcare provider for information if you currently smoke and need help to quit  E-cigarettes or smokeless tobacco still contain nicotine  Talk to your healthcare provider before you use these products  · Limit alcohol as directed  Alcohol can trigger an acute headache or make it worse  If you have cluster headaches, do not drink alcohol during an episode  For other types of headaches, ask your healthcare provider if it is safe for you to drink alcohol  Ask how much is safe for you to drink, and how often  · Exercise as directed  Exercise can reduce tension and help with headache pain  Aim for 30 minutes of physical activity on most days of the week  Your healthcare provider can help you create an exercise plan  · Eat a variety of healthy foods  Healthy foods include fruits, vegetables, low-fat dairy products, lean meats, fish, whole grains, and cooked beans  Your healthcare provider or dietitian can help you create meals plans if you need to avoid foods that trigger headaches  Follow up with your healthcare provider as directed:  Bring your headache record with you when you see your healthcare provider  Write down your questions so you remember to ask them during your visits  © 2017 [employer] Information is for End User's use only and may not be sold, redistributed or otherwise used for commercial purposes   All illustrations and images included in CareNotes® are the copyrighted property of [employer]  or [employer]  The above information is an  only  It is not intended as medical advice for individual conditions or treatments  Talk to your doctor, nurse or pharmacist before following any medical regimen to see if it is safe and effective for you

## 2018-08-15 ENCOUNTER — OFFICE VISIT (OUTPATIENT)
Dept: FAMILY MEDICINE CLINIC | Facility: CLINIC | Age: 28
End: 2018-08-15
Payer: COMMERCIAL

## 2018-08-15 VITALS
TEMPERATURE: 96.8 F | BODY MASS INDEX: 43.59 KG/M2 | SYSTOLIC BLOOD PRESSURE: 135 MMHG | WEIGHT: 246 LBS | DIASTOLIC BLOOD PRESSURE: 90 MMHG | HEIGHT: 63 IN

## 2018-08-15 DIAGNOSIS — I10 HTN (HYPERTENSION), BENIGN: ICD-10-CM

## 2018-08-15 DIAGNOSIS — R53.83 FATIGUE, UNSPECIFIED TYPE: Primary | ICD-10-CM

## 2018-08-15 DIAGNOSIS — R11.0 NAUSEA: ICD-10-CM

## 2018-08-15 LAB
ALBUMIN SERPL BCP-MCNC: 3.5 G/DL (ref 3.5–5)
ALP SERPL-CCNC: 88 U/L (ref 46–116)
ALT SERPL W P-5'-P-CCNC: 26 U/L (ref 12–78)
ANION GAP SERPL CALCULATED.3IONS-SCNC: 7 MMOL/L (ref 4–13)
AST SERPL W P-5'-P-CCNC: 15 U/L (ref 5–45)
BASOPHILS # BLD AUTO: 0.04 THOUSANDS/ΜL (ref 0–0.1)
BASOPHILS NFR BLD AUTO: 0 % (ref 0–1)
BILIRUB SERPL-MCNC: 0.37 MG/DL (ref 0.2–1)
BUN SERPL-MCNC: 7 MG/DL (ref 5–25)
CALCIUM SERPL-MCNC: 8.8 MG/DL (ref 8.3–10.1)
CHLORIDE SERPL-SCNC: 103 MMOL/L (ref 100–108)
CO2 SERPL-SCNC: 27 MMOL/L (ref 21–32)
CREAT SERPL-MCNC: 0.55 MG/DL (ref 0.6–1.3)
EOSINOPHIL # BLD AUTO: 0.06 THOUSAND/ΜL (ref 0–0.61)
EOSINOPHIL NFR BLD AUTO: 1 % (ref 0–6)
ERYTHROCYTE [DISTWIDTH] IN BLOOD BY AUTOMATED COUNT: 13.4 % (ref 11.6–15.1)
GFR SERPL CREATININE-BSD FRML MDRD: 128 ML/MIN/1.73SQ M
GLUCOSE SERPL-MCNC: 78 MG/DL (ref 65–140)
HCT VFR BLD AUTO: 44.7 % (ref 34.8–46.1)
HGB BLD-MCNC: 14 G/DL (ref 11.5–15.4)
IMM GRANULOCYTES # BLD AUTO: 0.07 THOUSAND/UL (ref 0–0.2)
IMM GRANULOCYTES NFR BLD AUTO: 1 % (ref 0–2)
LYMPHOCYTES # BLD AUTO: 2.54 THOUSANDS/ΜL (ref 0.6–4.47)
LYMPHOCYTES NFR BLD AUTO: 23 % (ref 14–44)
MCH RBC QN AUTO: 28.2 PG (ref 26.8–34.3)
MCHC RBC AUTO-ENTMCNC: 31.3 G/DL (ref 31.4–37.4)
MCV RBC AUTO: 90 FL (ref 82–98)
MONOCYTES # BLD AUTO: 0.9 THOUSAND/ΜL (ref 0.17–1.22)
MONOCYTES NFR BLD AUTO: 8 % (ref 4–12)
NEUTROPHILS # BLD AUTO: 7.45 THOUSANDS/ΜL (ref 1.85–7.62)
NEUTS SEG NFR BLD AUTO: 67 % (ref 43–75)
NRBC BLD AUTO-RTO: 0 /100 WBCS
PLATELET # BLD AUTO: 342 THOUSANDS/UL (ref 149–390)
PMV BLD AUTO: 11.1 FL (ref 8.9–12.7)
POTASSIUM SERPL-SCNC: 4.2 MMOL/L (ref 3.5–5.3)
PROT SERPL-MCNC: 7.6 G/DL (ref 6.4–8.2)
RBC # BLD AUTO: 4.96 MILLION/UL (ref 3.81–5.12)
SL AMB  POCT GLUCOSE, UA: NORMAL
SL AMB LEUKOCYTE ESTERASE,UA: NEGATIVE
SL AMB POCT BILIRUBIN,UA: NEGATIVE
SL AMB POCT BLOOD,UA: NEGATIVE
SL AMB POCT CLARITY,UA: CLEAR
SL AMB POCT COLOR,UA: YELLOW
SL AMB POCT KETONES,UA: NEGATIVE
SL AMB POCT NITRITE,UA: NEGATIVE
SL AMB POCT PH,UA: 8
SL AMB POCT SPECIFIC GRAVITY,UA: 1
SL AMB POCT URINE HCG: NORMAL
SL AMB POCT URINE PROTEIN: NEGATIVE
SL AMB POCT UROBILINOGEN: NORMAL
SODIUM SERPL-SCNC: 137 MMOL/L (ref 136–145)
TSH SERPL DL<=0.05 MIU/L-ACNC: 2.87 UIU/ML (ref 0.36–3.74)
WBC # BLD AUTO: 11.06 THOUSAND/UL (ref 4.31–10.16)

## 2018-08-15 PROCEDURE — 86665 EPSTEIN-BARR CAPSID VCA: CPT | Performed by: FAMILY MEDICINE

## 2018-08-15 PROCEDURE — 84443 ASSAY THYROID STIM HORMONE: CPT | Performed by: FAMILY MEDICINE

## 2018-08-15 PROCEDURE — 81025 URINE PREGNANCY TEST: CPT | Performed by: FAMILY MEDICINE

## 2018-08-15 PROCEDURE — 86618 LYME DISEASE ANTIBODY: CPT | Performed by: FAMILY MEDICINE

## 2018-08-15 PROCEDURE — 3008F BODY MASS INDEX DOCD: CPT | Performed by: FAMILY MEDICINE

## 2018-08-15 PROCEDURE — 86664 EPSTEIN-BARR NUCLEAR ANTIGEN: CPT | Performed by: FAMILY MEDICINE

## 2018-08-15 PROCEDURE — 81002 URINALYSIS NONAUTO W/O SCOPE: CPT | Performed by: FAMILY MEDICINE

## 2018-08-15 PROCEDURE — 99204 OFFICE O/P NEW MOD 45 MIN: CPT | Performed by: FAMILY MEDICINE

## 2018-08-15 PROCEDURE — 86663 EPSTEIN-BARR ANTIBODY: CPT | Performed by: FAMILY MEDICINE

## 2018-08-15 PROCEDURE — 85025 COMPLETE CBC W/AUTO DIFF WBC: CPT | Performed by: FAMILY MEDICINE

## 2018-08-15 PROCEDURE — 36415 COLL VENOUS BLD VENIPUNCTURE: CPT | Performed by: FAMILY MEDICINE

## 2018-08-15 PROCEDURE — 80053 COMPREHEN METABOLIC PANEL: CPT | Performed by: FAMILY MEDICINE

## 2018-08-15 RX ORDER — AMLODIPINE BESYLATE 5 MG/1
5 TABLET ORAL DAILY
Qty: 90 TABLET | Refills: 3 | Status: SHIPPED | OUTPATIENT
Start: 2018-08-15 | End: 2018-09-30

## 2018-08-15 RX ORDER — ZOLPIDEM TARTRATE 5 MG/1
5 TABLET ORAL
Qty: 7 TABLET | Refills: 0 | Status: SHIPPED | OUTPATIENT
Start: 2018-08-15 | End: 2018-09-30

## 2018-08-15 RX ORDER — LANOLIN ALCOHOL/MO/W.PET/CERES
3 CREAM (GRAM) TOPICAL
Qty: 30 TABLET | Refills: 0 | Status: SHIPPED | OUTPATIENT
Start: 2018-08-15 | End: 2018-09-30

## 2018-08-15 NOTE — ASSESSMENT & PLAN NOTE
Patient is looking to become pregnant  Pregnancy test today was negative  However recommended switching away from losartan  If she becomes pregnant switching to a beta-blocker may be of benefit

## 2018-08-15 NOTE — LETTER
August 15, 2018     Patient: Fercho Recio   YOB: 1990   Date of Visit: 8/15/2018       To Whom it May Concern:    Fercho Recio is under my professional care  She was seen in my office on 8/15/2018  She may return to work on 08/16/2018  If you have any questions or concerns, please don't hesitate to call           Sincerely,          Francois Carrion DO        CC: No Recipients

## 2018-08-15 NOTE — PROGRESS NOTES
Assessment/Plan:  Consider GI evaluation if no improvement  Recommended considering starting the Zantac prescribed at urgent care center  Await blood test results  Urine pregnancy test and urinalysis done in the office today appear normal   She will call if any new symptoms develop her current symptoms worsen or persist   We recommended melatonin at night and Ambien at night for the next 1-2 weeks and then discontinuing if sleep cycle improves  Consider sleep study  Diagnoses and all orders for this visit:    Fatigue, unspecified type  -     POCT urine HCG  -     CBC and differential  -     Comprehensive metabolic panel  -     TSH, 3rd generation with Free T4 reflex  -     POCT urine dip  -     Cancel: EBV acute panel; Future  -     zolpidem (AMBIEN) 5 mg tablet; Take 1 tablet (5 mg total) by mouth daily at bedtime as needed for sleep  -     melatonin 3 mg; Take 1 tablet (3 mg total) by mouth daily at bedtime  -     Lyme Antibody Profile with reflex to WB; Future  -     EBV acute panel  -     Lyme Antibody Profile with reflex to WB  -     EBV acute panel    HTN (hypertension), benign  -     amLODIPine (NORVASC) 5 mg tablet; Take 1 tablet (5 mg total) by mouth daily    Nausea          Subjective:      Patient ID: Felipa Thorpe is a 29 y o  female  Patient is here for 1st time visit  She has 1-2 week history of fatigue and nausea  She was seen at urgent care center earlier in the week  She was sent home with ranitidine and Zofran  She has not started either 1 of those  She states she is feeling tired through the day but not sleeping at night  Denies chest pain shortness of breath or fevers  No myalgias or arthralgias  No rashes  Fatigue   Associated symptoms include fatigue and nausea  Nausea   Associated symptoms include fatigue and nausea     Diarrhea          The following portions of the patient's history were reviewed and updated as appropriate: allergies, current medications, past family history, past medical history, past social history, past surgical history and problem list     Review of Systems   Constitutional: Positive for fatigue  Gastrointestinal: Positive for diarrhea and nausea  Objective:      /90   Temp (!) 96 8 °F (36 °C)   Ht 5' 2 5" (1 588 m)   Wt 112 kg (246 lb)   LMP 07/26/2018   BMI 44 28 kg/m²          Physical Exam   Constitutional: She is oriented to person, place, and time  She appears well-developed and well-nourished  HENT:   Head: Normocephalic and atraumatic  Right Ear: External ear normal  Tympanic membrane is not erythematous and not bulging  Left Ear: External ear normal  Tympanic membrane is not erythematous and not bulging  Nose: Nose normal    Mouth/Throat: Oropharynx is clear and moist and mucous membranes are normal  No oral lesions  No oropharyngeal exudate  Eyes: Conjunctivae and EOM are normal  Right eye exhibits no discharge  Left eye exhibits no discharge  No scleral icterus  Neck: Normal range of motion  Neck supple  No thyromegaly present  Cardiovascular: Normal rate, regular rhythm and normal heart sounds  Exam reveals no gallop and no friction rub  No murmur heard  Pulmonary/Chest: Effort normal  No respiratory distress  She has no wheezes  She has no rales  She exhibits no tenderness  Abdominal: Soft  Bowel sounds are normal  She exhibits no distension and no mass  There is no tenderness  There is no rebound and no guarding  Musculoskeletal: Normal range of motion  She exhibits no edema, tenderness or deformity  Lymphadenopathy:     She has no cervical adenopathy  Neurological: She is alert and oriented to person, place, and time  She has normal reflexes  No cranial nerve deficit  She exhibits normal muscle tone  Coordination normal    Skin: Skin is warm and dry  No rash noted  No erythema  No pallor  Psychiatric: She has a normal mood and affect  Her behavior is normal    Vitals reviewed

## 2018-08-16 ENCOUNTER — TELEPHONE (OUTPATIENT)
Dept: OBGYN CLINIC | Facility: CLINIC | Age: 28
End: 2018-08-16

## 2018-08-16 LAB
B BURGDOR IGG SER IA-ACNC: 0.12
B BURGDOR IGM SER IA-ACNC: 0.18

## 2018-08-16 NOTE — TELEPHONE ENCOUNTER
Pt called - wants to start on BC pills - had previously seen Maria Fernanda & they discussed several options - she wants the Pill - please advise    [phone]

## 2018-08-16 NOTE — TELEPHONE ENCOUNTER
Pt had yly with nilda 6/27  She was on Vida before coming here - Non smoker  She wants us to put in 2 scripts Vida - brand name and vida - generic - as pharm won't tell her the cost till they get a rx  She would prefer brand  Not sure if we can do this     Uses [facility] [place], [place]  Thanks

## 2018-08-17 LAB
EBV EA IGG SER-ACNC: <9 U/ML (ref 0–8.9)
EBV NA IGG SER IA-ACNC: 329 U/ML (ref 0–17.9)
EBV PATRN SPEC IB-IMP: ABNORMAL
EBV VCA IGG SER IA-ACNC: >600 U/ML (ref 0–17.9)
EBV VCA IGM SER IA-ACNC: <36 U/ML (ref 0–35.9)

## 2018-08-17 NOTE — TELEPHONE ENCOUNTER
Informed pt re marah's message    Pt states fani had told her if she insisted, she would give her the vida    That it was her decision  States she has had all the clotting testing    She is a  employee    Does not want to make apt with marah if she is going to refuse her req    Not  interested in the other options    Had been given rx for years by gyn at [facility]  Informed I would send req to marah and cb with her opinion

## 2018-08-17 NOTE — TELEPHONE ENCOUNTER
Pt informed marah's decision     States she has made apt with another phys, and will just stay off oc until then

## 2018-08-21 NOTE — PROGRESS NOTES
No need for hematology evaluation at this time  Recommend rechecking blood testing again in 6-12 months

## 2018-09-30 ENCOUNTER — APPOINTMENT (EMERGENCY)
Dept: RADIOLOGY | Facility: HOSPITAL | Age: 28
End: 2018-09-30
Payer: COMMERCIAL

## 2018-09-30 ENCOUNTER — HOSPITAL ENCOUNTER (EMERGENCY)
Facility: HOSPITAL | Age: 28
Discharge: HOME | End: 2018-09-30
Attending: EMERGENCY MEDICINE | Admitting: EMERGENCY MEDICINE
Payer: COMMERCIAL

## 2018-09-30 VITALS
WEIGHT: 250 LBS | SYSTOLIC BLOOD PRESSURE: 114 MMHG | BODY MASS INDEX: 45 KG/M2 | HEART RATE: 87 BPM | RESPIRATION RATE: 18 BRPM | DIASTOLIC BLOOD PRESSURE: 58 MMHG | OXYGEN SATURATION: 100 % | TEMPERATURE: 98.9 F

## 2018-09-30 DIAGNOSIS — R10.9 ABDOMINAL CRAMPING: ICD-10-CM

## 2018-09-30 DIAGNOSIS — R10.32 BILATERAL LOWER ABDOMINAL CRAMPING: ICD-10-CM

## 2018-09-30 DIAGNOSIS — R10.31 BILATERAL LOWER ABDOMINAL CRAMPING: ICD-10-CM

## 2018-09-30 DIAGNOSIS — R19.7 DIARRHEA: ICD-10-CM

## 2018-09-30 DIAGNOSIS — Z34.91 FIRST TRIMESTER PREGNANCY: Primary | ICD-10-CM

## 2018-09-30 LAB
ALBUMIN SERPL BCP-MCNC: 3 G/DL (ref 3.5–5)
ALP SERPL-CCNC: 75 U/L (ref 46–116)
ALT SERPL W P-5'-P-CCNC: 22 U/L (ref 12–78)
ANION GAP SERPL CALCULATED.3IONS-SCNC: 3 MMOL/L (ref 4–13)
AST SERPL W P-5'-P-CCNC: 11 U/L (ref 5–45)
B-HCG SERPL-ACNC: 916 MIU/ML
BACTERIA UR QL AUTO: ABNORMAL /HPF
BASOPHILS # BLD AUTO: 0.04 THOUSANDS/ΜL (ref 0–0.1)
BASOPHILS NFR BLD AUTO: 0 % (ref 0–1)
BILIRUB SERPL-MCNC: 0.24 MG/DL (ref 0.2–1)
BILIRUB UR QL STRIP: NEGATIVE
BUN SERPL-MCNC: 9 MG/DL (ref 5–25)
CALCIUM SERPL-MCNC: 8.4 MG/DL (ref 8.3–10.1)
CHLORIDE SERPL-SCNC: 106 MMOL/L (ref 100–108)
CLARITY UR: CLEAR
CO2 SERPL-SCNC: 27 MMOL/L (ref 21–32)
COLOR UR: YELLOW
CREAT SERPL-MCNC: 0.64 MG/DL (ref 0.6–1.3)
EOSINOPHIL # BLD AUTO: 0.08 THOUSAND/ΜL (ref 0–0.61)
EOSINOPHIL NFR BLD AUTO: 1 % (ref 0–6)
ERYTHROCYTE [DISTWIDTH] IN BLOOD BY AUTOMATED COUNT: 13.3 % (ref 11.6–15.1)
GFR SERPL CREATININE-BSD FRML MDRD: 122 ML/MIN/1.73SQ M
GLUCOSE SERPL-MCNC: 89 MG/DL (ref 65–140)
GLUCOSE UR STRIP-MCNC: NEGATIVE MG/DL
HCT VFR BLD AUTO: 43.2 % (ref 34.8–46.1)
HGB BLD-MCNC: 13.6 G/DL (ref 11.5–15.4)
HGB UR QL STRIP.AUTO: ABNORMAL
HYALINE CASTS #/AREA URNS LPF: ABNORMAL /LPF
IMM GRANULOCYTES # BLD AUTO: 0.07 THOUSAND/UL (ref 0–0.2)
IMM GRANULOCYTES NFR BLD AUTO: 1 % (ref 0–2)
KETONES UR STRIP-MCNC: NEGATIVE MG/DL
LEUKOCYTE ESTERASE UR QL STRIP: NEGATIVE
LIPASE SERPL-CCNC: 145 U/L (ref 73–393)
LYMPHOCYTES # BLD AUTO: 2.2 THOUSANDS/ΜL (ref 0.6–4.47)
LYMPHOCYTES NFR BLD AUTO: 23 % (ref 14–44)
MCH RBC QN AUTO: 27.9 PG (ref 26.8–34.3)
MCHC RBC AUTO-ENTMCNC: 31.5 G/DL (ref 31.4–37.4)
MCV RBC AUTO: 89 FL (ref 82–98)
MONOCYTES # BLD AUTO: 0.81 THOUSAND/ΜL (ref 0.17–1.22)
MONOCYTES NFR BLD AUTO: 8 % (ref 4–12)
NEUTROPHILS # BLD AUTO: 6.53 THOUSANDS/ΜL (ref 1.85–7.62)
NEUTS SEG NFR BLD AUTO: 67 % (ref 43–75)
NITRITE UR QL STRIP: NEGATIVE
NON-SQ EPI CELLS URNS QL MICRO: ABNORMAL /HPF
NRBC BLD AUTO-RTO: 0 /100 WBCS
PH UR STRIP.AUTO: 6 [PH] (ref 4.5–8)
PLATELET # BLD AUTO: 329 THOUSANDS/UL (ref 149–390)
PMV BLD AUTO: 10.1 FL (ref 8.9–12.7)
POTASSIUM SERPL-SCNC: 3.8 MMOL/L (ref 3.5–5.3)
PROT SERPL-MCNC: 7.2 G/DL (ref 6.4–8.2)
PROT UR STRIP-MCNC: NEGATIVE MG/DL
RBC # BLD AUTO: 4.88 MILLION/UL (ref 3.81–5.12)
RBC #/AREA URNS AUTO: ABNORMAL /HPF
SODIUM SERPL-SCNC: 136 MMOL/L (ref 136–145)
SP GR UR STRIP.AUTO: 1.01 (ref 1–1.03)
UROBILINOGEN UR QL STRIP.AUTO: 0.2 E.U./DL
WBC # BLD AUTO: 9.73 THOUSAND/UL (ref 4.31–10.16)
WBC #/AREA URNS AUTO: ABNORMAL /HPF

## 2018-09-30 PROCEDURE — 96360 HYDRATION IV INFUSION INIT: CPT

## 2018-09-30 PROCEDURE — 83690 ASSAY OF LIPASE: CPT | Performed by: EMERGENCY MEDICINE

## 2018-09-30 PROCEDURE — 80053 COMPREHEN METABOLIC PANEL: CPT | Performed by: EMERGENCY MEDICINE

## 2018-09-30 PROCEDURE — 96361 HYDRATE IV INFUSION ADD-ON: CPT

## 2018-09-30 PROCEDURE — 76815 OB US LIMITED FETUS(S): CPT

## 2018-09-30 PROCEDURE — 99284 EMERGENCY DEPT VISIT MOD MDM: CPT

## 2018-09-30 PROCEDURE — 81001 URINALYSIS AUTO W/SCOPE: CPT

## 2018-09-30 PROCEDURE — 85025 COMPLETE CBC W/AUTO DIFF WBC: CPT | Performed by: EMERGENCY MEDICINE

## 2018-09-30 PROCEDURE — 36415 COLL VENOUS BLD VENIPUNCTURE: CPT | Performed by: EMERGENCY MEDICINE

## 2018-09-30 PROCEDURE — 87491 CHLMYD TRACH DNA AMP PROBE: CPT | Performed by: EMERGENCY MEDICINE

## 2018-09-30 PROCEDURE — 87591 N.GONORRHOEAE DNA AMP PROB: CPT | Performed by: EMERGENCY MEDICINE

## 2018-09-30 PROCEDURE — 84702 CHORIONIC GONADOTROPIN TEST: CPT | Performed by: EMERGENCY MEDICINE

## 2018-09-30 RX ADMIN — SODIUM CHLORIDE 1000 ML: 0.9 INJECTION, SOLUTION INTRAVENOUS at 10:09

## 2018-09-30 NOTE — DISCHARGE INSTRUCTIONS
Abdominal Pain in Pregnancy   WHAT YOU NEED TO KNOW:   Abdominal pain during pregnancy is common  Some of the causes include heartburn, constipation, gas, false labor, and round ligament pain  Round ligament pain is caused by stretching of the ligaments that support your uterus  Abdominal pain may be caused by a health problem, such as a stomach virus or appendicitis (inflammation of the appendix)  The pain may also be caused by a problem with your pregnancy, such as a threatened miscarriage or  labor  DISCHARGE INSTRUCTIONS:   Follow up with your obstetrician within 3 days:  Write down your questions so you remember to ask them during your visits  Self-care:   · Rest may help to relieve round ligament pain  Ask your healthcare provider about other ways to relieve this pain, such as a supportive belt or pregnancy exercises  · Use a heating pad set to the lowest setting or a hot compress to apply heat to your abdomen  Do this for 20 to 30 minutes every 2 hours for as many days as directed  · Avoid quick changes in position or movements that cause pain  · Do not lie flat in bed or bend over if you have heartburn  Ask your obstetrician if you should make any changes to the foods you eat  Ask if you can take any medicines for heartburn  · Eat more fiber and drink more liquids to relieve constipation  Fiber is found in fruits, vegetables, and whole-grain foods, such as whole-wheat bread and cereals  Ask how much liquid to drink each day and which liquids are best for you  Contact your obstetrician if:  · You continue to have abdominal pain that cannot be relieved  · You have a fever  · You have questions or concerns about your condition or care  Return to the emergency department if:   · You have sudden, severe pain or cramps that are so bad that you cannot walk or talk  · You have a fast heartbeat, shortness of breath, and you feel lightheaded or faint       · You have vaginal bleeding or discharge  · You have nausea, vomiting, fever, and severe pain on your right side  © 2017 [employer] Information is for End User's use only and may not be sold, redistributed or otherwise used for commercial purposes  All illustrations and images included in CareNotes® are the copyrighted property of [employer]  or [employer]  The above information is an  only  It is not intended as medical advice for individual conditions or treatments  Talk to your doctor, nurse or pharmacist before following any medical regimen to see if it is safe and effective for you

## 2018-09-30 NOTE — ED PROVIDER NOTES
History  Chief Complaint   Patient presents with    Abdominal Pain     Patient reports that she went to [facility] on Friday for Chest pain, then found out she was pregnant  Yesterday patient had cramping n/v/d after going out to eat  Patient reports that this morning she woke up and was having more cramping  Denies spotting, discharge  30 yo F  LMP Aug 28th presents to ED for lower abdominal cramping  Pt says she has PCOS and did not think she could get pregnant so stopped taking her OCPs about 4 months ago  Since she stopped taking OCPs, she had actually been having regular periods  Pt denies any vaginal bleeding or abnormal vaginal discharge  Since before she found out she was pregnant 2 days ago (went to the ED for chest pain/shortness of breath and found to be pregnant) she had already been having abdominal cramping that she says felt like her typical period cramps  However, last night this acutely worsened  She says she was out to dinner with her boyfriend and developed worse cramping and diaphoresis  After she went home, she had a large amount of diarrhea and again this morning  Her cramping is still worse today, so she came to the ED for evaluation  No nausea/vomiting  No fever/chills  No sick contacts  Boyfriend who ate the same food asymptomatic  Denies any history of abdominal problems except for history of known gallstones, which felt very different  No prior abdominal surgeries  Denies history of STDs but per chart review has history of genital herpes  Denies recent alcohol use  Noted initial SBP 160s in triage  Pt says she has history of hypertension and was previously taking amlodipine  However, she stopped taking it a few months ago because she just didn't feel like taking it  She does not check her blood pressures at home  Prior to Admission Medications   Prescriptions Last Dose Informant Patient Reported? Taking?    Prenatal Vit-Fe Fumarate-FA (PRENATAL VITAMIN PO)   Yes Yes   Sig: Take 1 tablet by mouth daily      Facility-Administered Medications: None       Past Medical History:   Diagnosis Date    Gallstone     Genital herpes     Hypertension        History reviewed  No pertinent surgical history  Family History   Problem Relation Age of Onset    Hypertension Mother     Cancer Mother     Other Mother         High cholesterol    Other Maternal Aunt         High cholesterol     I have reviewed and agree with the history as documented  Social History   Substance Use Topics    Smoking status: Never Smoker    Smokeless tobacco: Never Used      Comment: per allscripts - former smoker    Alcohol use Yes      Comment: soc        Review of Systems   Constitutional: Negative for activity change, chills and fever  HENT: Negative for congestion, rhinorrhea, sore throat and trouble swallowing  Eyes: Negative for pain, discharge and visual disturbance  Respiratory: Negative for cough, chest tightness and wheezing  Cardiovascular: Negative for chest pain, palpitations and leg swelling  Gastrointestinal: Positive for abdominal pain and diarrhea  Negative for nausea and vomiting  Genitourinary: Negative for dysuria, frequency, urgency, vaginal bleeding and vaginal discharge  Musculoskeletal: Negative for gait problem, neck pain and neck stiffness  Skin: Negative for color change, rash and wound  Neurological: Negative for dizziness, syncope, light-headedness and headaches         Physical Exam  ED Triage Vitals [09/30/18 0933]   Temperature Pulse Respirations Blood Pressure SpO2   98 9 °F (37 2 °C) (!) 107 18 165/90 99 %      Temp Source Heart Rate Source Patient Position - Orthostatic VS BP Location FiO2 (%)   Oral Monitor Sitting Right arm --      Pain Score       3           Orthostatic Vital Signs  Vitals:    09/30/18 0933 09/30/18 1141 09/30/18 1352   BP: 165/90 137/60 114/58   Pulse: (!) 107 96 87   Patient Position - Orthostatic VS: Sitting Lying Lying Physical Exam   Constitutional: She is oriented to person, place, and time  She appears well-developed and well-nourished  No distress  HENT:   Head: Normocephalic and atraumatic  Mouth/Throat: Oropharynx is clear and moist    Eyes: Conjunctivae and EOM are normal  Right eye exhibits no discharge  Left eye exhibits no discharge  Neck: Normal range of motion  Neck supple  Cardiovascular: Regular rhythm and intact distal pulses  Mild tachycardia   Pulmonary/Chest: Effort normal and breath sounds normal  No stridor  No respiratory distress  She exhibits no tenderness  Abdominal: Soft  Bowel sounds are normal  She exhibits distension (obese)  There is tenderness (diffuse lower abdominal tenderness)  There is no rebound and no guarding  Genitourinary: Pelvic exam was performed with patient supine  There is no tenderness or lesion on the right labia  There is no tenderness or lesion on the left labia  Cervix exhibits friability  Cervix exhibits no motion tenderness  Right adnexum displays no tenderness  Left adnexum displays no tenderness  No tenderness or bleeding in the vagina  Vaginal discharge (physiologic appearing discharge) found  Musculoskeletal: Normal range of motion  She exhibits no edema or tenderness  Neurological: She is alert and oriented to person, place, and time  No sensory deficit  She exhibits normal muscle tone  Skin: Skin is warm and dry  Capillary refill takes less than 2 seconds  No rash noted  Nursing note and vitals reviewed  ED Medications  Medications   sodium chloride 0 9 % bolus 1,000 mL (0 mL Intravenous Stopped 9/30/18 1351)       Diagnostic Studies  Results Reviewed     Procedure Component Value Units Date/Time    Chlamydia/GC amplified DNA by PCR [[id number]] Collected:  09/30/18 1107    Lab Status:   In process Specimen:  Cervix Updated:  09/30/18 1111    Comprehensive metabolic panel [[id number]]  (Abnormal) Collected:  09/30/18 1008    Lab Status:  Final result Specimen:  Blood from Arm, Right Updated:  09/30/18 1057     Sodium 136 mmol/L      Potassium 3 8 mmol/L      Chloride 106 mmol/L      CO2 27 mmol/L      ANION GAP 3 (L) mmol/L      BUN 9 mg/dL      Creatinine 0 64 mg/dL      Glucose 89 mg/dL      Calcium 8 4 mg/dL      AST 11 U/L      ALT 22 U/L      Alkaline Phosphatase 75 U/L      Total Protein 7 2 g/dL      Albumin 3 0 (L) g/dL      Total Bilirubin 0 24 mg/dL      eGFR 122 ml/min/1 73sq m     Narrative:         National Kidney Disease Education Program recommendations are as follows:  GFR calculation is accurate only with a steady state creatinine  Chronic Kidney disease less than 60 ml/min/1 73 sq  meters  Kidney failure less than 15 ml/min/1 73 sq  meters      Lipase [[id number]]  (Normal) Collected:  09/30/18 1008    Lab Status:  Final result Specimen:  Blood from Arm, Right Updated:  09/30/18 1057     Lipase 145 u/L     hCG, quantitative [[id number]]  (Abnormal) Collected:  09/30/18 1008    Lab Status:  Final result Specimen:  Blood from Arm, Right Updated:  09/30/18 1057     HCG, Quant 916 (H) mIU/mL     Narrative:          Expected Ranges:     Approximate               Approximate HCG  Gestation age          Concentration ( mIU/mL)  _____________          ______________________   Merlin Sear                      HCG values  0 2-1                       5-50  1-2                           2-3                         100-5000  3-4                         500-03025  4-5                         1000-60844  5-6                         [id number]  6-8                         [id number]  8-12                        [id number]    CBC and differential [[id number]] Collected:  09/30/18 1008    Lab Status:  Final result Specimen:  Blood from Arm, Right Updated:  09/30/18 1026     WBC 9 73 Thousand/uL      RBC 4 88 Million/uL      Hemoglobin 13 6 g/dL      Hematocrit 43 2 %      MCV 89 fL      MCH 27 9 pg      MCHC 31 5 g/dL      RDW 13 3 %      MPV 10 1 fL Platelets 794 Thousands/uL      nRBC 0 /100 WBCs      Neutrophils Relative 67 %      Immat GRANS % 1 %      Lymphocytes Relative 23 %      Monocytes Relative 8 %      Eosinophils Relative 1 %      Basophils Relative 0 %      Neutrophils Absolute 6 53 Thousands/µL      Immature Grans Absolute 0 07 Thousand/uL      Lymphocytes Absolute 2 20 Thousands/µL      Monocytes Absolute 0 81 Thousand/µL      Eosinophils Absolute 0 08 Thousand/µL      Basophils Absolute 0 04 Thousands/µL     Urine Microscopic [[id number]]  (Abnormal) Collected:  18    Lab Status:  Final result Specimen:  Urine from Urine, Clean Catch Updated:  18 1010     RBC, UA 2-4 (A) /hpf      WBC, UA None Seen /hpf      Epithelial Cells None Seen /hpf      Bacteria, UA Occasional /hpf      Hyaline Casts, UA None Seen /lpf     POCT urinalysis dipstick [[id number]]  (Abnormal) Resulted:  18    Lab Status:  Final result Specimen:  Urine Updated:  18    ED Urine Macroscopic [[id number]]  (Abnormal) Collected:  18    Lab Status:  Final result Specimen:  Urine Updated:  18     Color, UA Yellow     Clarity, UA Clear     pH, UA 6 0     Leukocytes, UA Negative     Nitrite, UA Negative     Protein, UA Negative mg/dl      Glucose, UA Negative mg/dl      Ketones, UA Negative mg/dl      Urobilinogen, UA 0 2 E U /dl      Bilirubin, UA Negative     Blood, UA Trace (A)     Specific [place], UA 1 010    Narrative:       CLINITEK RESULT                 US OB < 14 weeks single or first gestation level 1   Final Result by Shine Begum MD ( 1323)   No intrauterine gestation or adnexal mass identified  3 mm hypoechoic focus could represent early gestational sac though there is no double decidual reaction or yolk sac  Differential remains early IUP, spontaneous  and ectopic pregnancy  Correlate with serial quantitative BHCG              Workstation performed: [id number] Procedures  Procedures      Phone Consults  ED Phone Contact    ED Course                               MDM  Number of Diagnoses or Management Options  Abdominal cramping:   Diarrhea:   First trimester pregnancy:   Diagnosis management comments: Abdominal exam benign  CBC, CMP unremarkable  No confirmed IUP on US, but no adnexal mass either  Likely early IUP  hcg quant rising appropriately  BP repeated in ED  On discharge /58  Do not restart amlodipine  Encouraged pt to monitor BP at home and discuss with obgyn outpatient  CritCare Time    Disposition  Final diagnoses:   First trimester pregnancy   Abdominal cramping   Diarrhea     Time reflects when diagnosis was documented in both MDM as applicable and the Disposition within this note     Time User Action Codes Description Comment    9/30/2018  1:47 PM Claudean Bares [Z27 [id number]] First trimester pregnancy     9/30/2018  1:47 PM Wallace Elizondo [R10 9] Abdominal cramping     9/30/2018  1:48 PM Vero Ingram, Koki Yony Adorno [R19 7] Diarrhea       ED Disposition     ED Disposition Condition Comment    Discharge  [facility] discharge to home/self care  Condition at discharge: Good        Follow-up Information     Follow up With Specialties Details Why Contact Info Additional [address] Emergency Department Emergency Medicine  If symptoms worsen, As needed [address] [facility] ED, [address], [place], [address]    your obgyn  On 10/10/2018 as previously scheduled            Discharge Medication List as of 9/30/2018  1:48 PM      CONTINUE these medications which have NOT CHANGED    Details   Prenatal Vit-Fe Fumarate-FA (PRENATAL VITAMIN PO) Take 1 tablet by mouth daily, Historical Med           No discharge procedures on file  ED Provider  Attending physically available and evaluated [facility]  I managed the patient along with the ED Attending      Electronically Signed by         Nicolasa Kelley MD  09/30/18 8054

## 2018-09-30 NOTE — ED ATTENDING ATTESTATION
Bre Aguilar DO, saw and evaluated the patient  I have discussed the patient with the resident/non-physician practitioner and agree with the resident's/non-physician practitioner's findings, Plan of Care, and MDM as documented in the resident's/non-physician practitioner's note, except where noted  All available labs and Radiology studies were reviewed  At this point I agree with the current assessment done in the Emergency Department  I have conducted an independent evaluation of this patient a history and physical is as follows:    Patient is a 80-year-old female who is a  presented for lower abdominal cramping  Patient has a history of polycystic ovarian syndrome and did not think she could get pregnant so she stopped taking her oral contraceptives about 4 months ago  She has been having regular periods since stoppage of her birth control pills  She denies any vaginal bleeding or vaginal discharge at this time  She found out she was pregnant 2 days ago and has had intermittent episodes of abdominal cramping for several days but worsened last night  Associated with some episodes of diarrhea but cramping persistent this morning  Denies any nausea or vomiting, fever, chills, headache, edema  Patient states he had been treated for high blood pressure in the past but not currently  Denies any headache or visual changes  Her serum beta HCG was approximately 352 days ago  Will perform pelvic ultrasound to rule out ectopic pregnancy, repeat beta HCG to determine if it is doubling over last 48 hours, pelvic exam and reassess  Blood pressure improved without any intervention, serum beta HCG over 900 and unremarkable pelvic ultrasound, most likely too early to definitively identify an intrauterine pregnancy at this time  Patient was urged to follow up with her OBGYN as well as her primary care physician and return if condition worsens      Critical Care Time  CritCare Time    Procedures

## 2018-09-30 NOTE — ED NOTES
Patient back from [address] [place] [address],[address]        Adriane Mealing  09/30/18 1929

## 2018-10-01 ENCOUNTER — OFFICE VISIT (OUTPATIENT)
Dept: OBGYN CLINIC | Facility: CLINIC | Age: 28
End: 2018-10-01
Payer: COMMERCIAL

## 2018-10-01 VITALS — SYSTOLIC BLOOD PRESSURE: 138 MMHG | BODY MASS INDEX: 46.11 KG/M2 | WEIGHT: 256.2 LBS | DIASTOLIC BLOOD PRESSURE: 86 MMHG

## 2018-10-01 DIAGNOSIS — E66.01 CLASS 3 SEVERE OBESITY DUE TO EXCESS CALORIES WITHOUT SERIOUS COMORBIDITY WITH BODY MASS INDEX (BMI) OF 45.0 TO 49.9 IN ADULT (HCC): ICD-10-CM

## 2018-10-01 DIAGNOSIS — Z32.00 UNCONFIRMED PREGNANCY: Primary | ICD-10-CM

## 2018-10-01 PROBLEM — B37.3 YEAST VAGINITIS: Status: RESOLVED | Noted: 2018-07-13 | Resolved: 2018-10-01

## 2018-10-01 PROBLEM — B37.31 YEAST VAGINITIS: Status: RESOLVED | Noted: 2018-07-13 | Resolved: 2018-10-01

## 2018-10-01 PROBLEM — N76.0 BACTERIAL VAGINOSIS: Status: RESOLVED | Noted: 2018-07-17 | Resolved: 2018-10-01

## 2018-10-01 PROBLEM — B96.89 BACTERIAL VAGINOSIS: Status: RESOLVED | Noted: 2018-07-17 | Resolved: 2018-10-01

## 2018-10-01 PROCEDURE — 99214 OFFICE O/P EST MOD 30 MIN: CPT | Performed by: OBSTETRICS & GYNECOLOGY

## 2018-10-02 ENCOUNTER — LAB (OUTPATIENT)
Dept: LAB | Facility: HOSPITAL | Age: 28
End: 2018-10-02
Attending: OBSTETRICS & GYNECOLOGY
Payer: COMMERCIAL

## 2018-10-02 DIAGNOSIS — Z32.00 UNCONFIRMED PREGNANCY: ICD-10-CM

## 2018-10-02 LAB
B-HCG SERPL-ACNC: 2140.1 MIU/ML
CHLAMYDIA DNA CVX QL NAA+PROBE: NORMAL
N GONORRHOEA DNA GENITAL QL NAA+PROBE: NORMAL
PROGEST SERPL-MCNC: 13.6 NG/ML

## 2018-10-02 PROCEDURE — 84702 CHORIONIC GONADOTROPIN TEST: CPT

## 2018-10-02 PROCEDURE — 36415 COLL VENOUS BLD VENIPUNCTURE: CPT

## 2018-10-02 PROCEDURE — 84144 ASSAY OF PROGESTERONE: CPT

## 2018-10-02 NOTE — PROGRESS NOTES
Called spoke with patient  Has had appropriate rise in HCG level  No further pain  Between 4 and 5 weeks pregnant  Reviewed ectopic precautions with patient but likely this is a early pregnancy  Will set up intake visit for next 1-2 weeks and the ultrasound to follow the week after intake

## 2018-10-02 NOTE — PROGRESS NOTES
Assessment/Plan:    Unconfirmed pregnancy  -     hCG, quantitative; Future  -     Progesterone; Future        -     if appropriate rise will schedule for intake visit  If inappropriate rise will repeat ultrasound  Class 3 severe obesity due to excess calories without serious comorbidity with body mass index (BMI) of 45 0 to 49 9 in adult [facility])      Chronic hypertension         -   at this point patient's blood pressures have not consistently been in severe range  No treatment at this point         -   to start baby aspirin at 12 weeks         -   consider CHAP study if found to be a viable pregnancy  Subjective:      Patient ID: Kiley Gifford is a 29 y o  female  HPI  24-year-old  presents after being seen in the ER for abdominal pain  At this point she was found to be pregnant  Her pain has since resolved  When she was in the ER the pregnancy was too early to be identified  Her HCG has increased appropriately    -  -   She denies any vaginal bleeding  Her LMP was approximately 4 weeks ago  She stopped taking her OCP a about 4 months ago  She has a history of PCO S  She has a history of chronic hypertension  This was an unplanned but desired pregnancy    The following portions of the patient's history were reviewed and updated as appropriate: allergies, current medications, past family history, past medical history, past social history, past surgical history and problem list     Review of Systems   Constitutional: Positive for fatigue  Negative for fever  HENT: Negative  Respiratory: Negative  Cardiovascular: Negative  Gastrointestinal: Negative  Genitourinary: Positive for menstrual problem and pelvic pain  Negative for vaginal bleeding, vaginal discharge and vaginal pain  Neurological: Negative  Psychiatric/Behavioral: Negative            Objective:      /86 (BP Location: Right arm, Patient Position: Sitting, Cuff Size: Large)   Wt 116 kg (256 lb 3 2 oz)   LMP 08/28/2018   BMI 46 11 kg/m²          Physical Exam   Constitutional: She appears well-developed and well-nourished  HENT:   Head: Normocephalic and atraumatic  Cardiovascular: Normal rate, regular rhythm and normal heart sounds  Pulmonary/Chest: Effort normal and breath sounds normal  No respiratory distress  She has no wheezes  Abdominal: Soft  There is no tenderness  There is no rebound and no guarding  Nursing note and vitals reviewed

## 2018-10-03 ENCOUNTER — TELEPHONE (OUTPATIENT)
Dept: OBGYN CLINIC | Facility: CLINIC | Age: 28
End: 2018-10-03

## 2018-10-03 NOTE — TELEPHONE ENCOUNTER
Patient called for results of HCG  She wanted to know the exact number  Reviewed it was 2140 1  Verbalized understanding  Routing to provider to update

## 2018-10-05 ENCOUNTER — TELEPHONE (OUTPATIENT)
Dept: OBGYN CLINIC | Facility: CLINIC | Age: 28
End: 2018-10-05

## 2018-10-05 NOTE — TELEPHONE ENCOUNTER
Patient is approx 5wk pregnant diarrhea, and acid reflux and stomach  Slight fever resolved with tylenol  Advised BRAT diet, increase fluids, tylenol and tums for acid reflux  If not better can call PCP or [facility]  Verbalized understanding  Routing to provider for further advise

## 2018-10-11 ENCOUNTER — OFFICE VISIT (OUTPATIENT)
Dept: FAMILY MEDICINE CLINIC | Facility: CLINIC | Age: 28
End: 2018-10-11
Payer: COMMERCIAL

## 2018-10-11 ENCOUNTER — TELEPHONE (OUTPATIENT)
Dept: OBGYN CLINIC | Facility: CLINIC | Age: 28
End: 2018-10-11

## 2018-10-11 ENCOUNTER — INITIAL PRENATAL (OUTPATIENT)
Dept: OBGYN CLINIC | Facility: CLINIC | Age: 28
End: 2018-10-11

## 2018-10-11 VITALS
RESPIRATION RATE: 20 BRPM | BODY MASS INDEX: 47.43 KG/M2 | HEART RATE: 84 BPM | DIASTOLIC BLOOD PRESSURE: 86 MMHG | WEIGHT: 251.2 LBS | HEIGHT: 61 IN | SYSTOLIC BLOOD PRESSURE: 134 MMHG

## 2018-10-11 VITALS
HEIGHT: 61 IN | WEIGHT: 251 LBS | TEMPERATURE: 98.8 F | SYSTOLIC BLOOD PRESSURE: 142 MMHG | DIASTOLIC BLOOD PRESSURE: 90 MMHG | BODY MASS INDEX: 47.39 KG/M2

## 2018-10-11 DIAGNOSIS — Z3A.01 LESS THAN 8 WEEKS GESTATION OF PREGNANCY: ICD-10-CM

## 2018-10-11 DIAGNOSIS — I10 CHRONIC HYPERTENSION: ICD-10-CM

## 2018-10-11 DIAGNOSIS — Z34.01 ENCOUNTER FOR SUPERVISION OF NORMAL FIRST PREGNANCY IN FIRST TRIMESTER: Primary | ICD-10-CM

## 2018-10-11 DIAGNOSIS — K29.70 GASTRITIS WITHOUT BLEEDING, UNSPECIFIED CHRONICITY, UNSPECIFIED GASTRITIS TYPE: Primary | ICD-10-CM

## 2018-10-11 DIAGNOSIS — E28.2 POLYCYSTIC OVARIES: ICD-10-CM

## 2018-10-11 LAB
SL AMB  POCT GLUCOSE, UA: NORMAL
SL AMB LEUKOCYTE ESTERASE,UA: NEGATIVE
SL AMB POCT BILIRUBIN,UA: NEGATIVE
SL AMB POCT BLOOD,UA: NORMAL
SL AMB POCT CLARITY,UA: CLEAR
SL AMB POCT COLOR,UA: NORMAL
SL AMB POCT KETONES,UA: NORMAL
SL AMB POCT NITRITE,UA: NEGATIVE
SL AMB POCT PH,UA: 5
SL AMB POCT SPECIFIC GRAVITY,UA: 1.03
SL AMB POCT URINE PROTEIN: NEGATIVE
SL AMB POCT UROBILINOGEN: NORMAL

## 2018-10-11 PROCEDURE — 81002 URINALYSIS NONAUTO W/O SCOPE: CPT | Performed by: FAMILY MEDICINE

## 2018-10-11 PROCEDURE — 99213 OFFICE O/P EST LOW 20 MIN: CPT | Performed by: FAMILY MEDICINE

## 2018-10-11 PROCEDURE — OBC: Performed by: OBSTETRICS & GYNECOLOGY

## 2018-10-11 RX ORDER — CLOBETASOL PROPIONATE 0.5 MG/G
CREAM TOPICAL
COMMUNITY
Start: 2018-07-13 | End: 2018-10-11

## 2018-10-11 RX ORDER — DROSPIRENONE AND ETHINYL ESTRADIOL 0.02-3(28)
KIT ORAL
COMMUNITY
Start: 2018-08-21 | End: 2018-10-11

## 2018-10-11 RX ORDER — RANITIDINE 150 MG/1
150 CAPSULE ORAL 2 TIMES DAILY
Qty: 60 CAPSULE | Refills: 0 | Status: SHIPPED | OUTPATIENT
Start: 2018-10-11 | End: 2018-11-09 | Stop reason: SDUPTHER

## 2018-10-11 NOTE — TELEPHONE ENCOUNTER
Spoke with patient offer to order ultrasound for next week and reschedule OB Intake or she can come in do intake and still get ultrasound end of next week  Patient would like to keep appt for today due to work scheduled  Advised pelvic rest, increase fluids and fibers, Imodium sparingly  If diarrhea not better to call PCP  Routing to provider to update

## 2018-10-11 NOTE — PROGRESS NOTES
OB intake complete  - hx of chronic hypertension - no medication at present, states she was taking at one point but stopped because she wanted to become pregnant  Prenatal labs ordered including - prenatal panel, varicella, 1hr glucola, CMP, uric acid, Pr;Cr ratio and ultrasound  S/S of pregnancy - nausea, slight breast tenderness, acid reflux, headaches (resolved with tylenol), cramping and spotting  Genetic screening reviewed -- unsure at this time  PHQ 9 screening results - 6 offered counseling  Referral placed     FOB involved and supportive

## 2018-10-11 NOTE — TELEPHONE ENCOUNTER
LMP 8/28/19 severe cramping, spotting and diarrhea  States she went to [facility] on Saturday had a vaginal ultrasound gestational sac no fetal pole  She is scheduled for intake this afternoon  Routing to provider for advise

## 2018-10-17 ENCOUNTER — TELEPHONE (OUTPATIENT)
Dept: FAMILY MEDICINE CLINIC | Facility: CLINIC | Age: 28
End: 2018-10-17

## 2018-10-17 NOTE — TELEPHONE ENCOUNTER
Prerna Francois called today stating she was put on Ranitidine and was instructed to take it twice a day for a few days, and then ween off of it  She is currently pregnant and states she tried stopping the medication but is having really bad acid reflux  Patient states she is wondering if she can continue taking the medication and how long can she take it? Please advise

## 2018-10-20 ENCOUNTER — HOSPITAL ENCOUNTER (OUTPATIENT)
Dept: RADIOLOGY | Facility: HOSPITAL | Age: 28
Discharge: HOME | End: 2018-10-20
Attending: OBSTETRICS & GYNECOLOGY
Payer: COMMERCIAL

## 2018-10-20 DIAGNOSIS — Z34.01 ENCOUNTER FOR SUPERVISION OF NORMAL FIRST PREGNANCY IN FIRST TRIMESTER: ICD-10-CM

## 2018-10-20 PROCEDURE — 76801 OB US < 14 WKS SINGLE FETUS: CPT

## 2018-10-22 ENCOUNTER — DOCUMENTATION (OUTPATIENT)
Dept: OBGYN CLINIC | Facility: CLINIC | Age: 28
End: 2018-10-22

## 2018-10-22 DIAGNOSIS — Z3A.01 7 WEEKS GESTATION OF PREGNANCY: Primary | ICD-10-CM

## 2018-10-22 NOTE — PROGRESS NOTES
Called spoke with patient regarding ultrasound results  Single live IUP  Fetal heart tones were 159 beats per minute  Final EDC will be 6/4/2019 by LMP    Referral placed for sequential screen

## 2018-10-23 ENCOUNTER — APPOINTMENT (EMERGENCY)
Dept: ULTRASOUND IMAGING | Facility: HOSPITAL | Age: 28
End: 2018-10-23
Payer: COMMERCIAL

## 2018-10-23 ENCOUNTER — HOSPITAL ENCOUNTER (EMERGENCY)
Facility: HOSPITAL | Age: 28
Discharge: HOME | End: 2018-10-23
Attending: EMERGENCY MEDICINE | Admitting: EMERGENCY MEDICINE
Payer: COMMERCIAL

## 2018-10-23 VITALS
RESPIRATION RATE: 18 BRPM | HEIGHT: 60 IN | TEMPERATURE: 97.5 F | OXYGEN SATURATION: 98 % | HEART RATE: 103 BPM | WEIGHT: 257.72 LBS | BODY MASS INDEX: 50.6 KG/M2 | SYSTOLIC BLOOD PRESSURE: 130 MMHG | DIASTOLIC BLOOD PRESSURE: 69 MMHG

## 2018-10-23 DIAGNOSIS — K21.9 GERD (GASTROESOPHAGEAL REFLUX DISEASE): ICD-10-CM

## 2018-10-23 DIAGNOSIS — K80.20 CHOLELITHIASIS: ICD-10-CM

## 2018-10-23 DIAGNOSIS — Z34.91 FIRST TRIMESTER PREGNANCY: ICD-10-CM

## 2018-10-23 DIAGNOSIS — K52.9 GASTROENTERITIS: Primary | ICD-10-CM

## 2018-10-23 LAB
ABO GROUP BLD: NORMAL
ALBUMIN SERPL BCP-MCNC: 2.9 G/DL (ref 3.5–5)
ALP SERPL-CCNC: 86 U/L (ref 46–116)
ALT SERPL W P-5'-P-CCNC: 27 U/L (ref 12–78)
ANION GAP SERPL CALCULATED.3IONS-SCNC: 11 MMOL/L (ref 4–13)
AST SERPL W P-5'-P-CCNC: 11 U/L (ref 5–45)
BACTERIA UR QL AUTO: ABNORMAL /HPF
BASOPHILS # BLD AUTO: 0.03 THOUSANDS/ΜL (ref 0–0.1)
BASOPHILS NFR BLD AUTO: 0 % (ref 0–1)
BILIRUB SERPL-MCNC: 0.1 MG/DL (ref 0.2–1)
BILIRUB UR QL STRIP: NEGATIVE
BUN SERPL-MCNC: 9 MG/DL (ref 5–25)
CALCIUM SERPL-MCNC: 9 MG/DL (ref 8.3–10.1)
CHLORIDE SERPL-SCNC: 101 MMOL/L (ref 100–108)
CLARITY UR: CLEAR
CO2 SERPL-SCNC: 25 MMOL/L (ref 21–32)
COLOR UR: YELLOW
CREAT SERPL-MCNC: 0.6 MG/DL (ref 0.6–1.3)
EOSINOPHIL # BLD AUTO: 0.08 THOUSAND/ΜL (ref 0–0.61)
EOSINOPHIL NFR BLD AUTO: 1 % (ref 0–6)
ERYTHROCYTE [DISTWIDTH] IN BLOOD BY AUTOMATED COUNT: 13.1 % (ref 11.6–15.1)
EXT PREG TEST URINE: POSITIVE
GFR SERPL CREATININE-BSD FRML MDRD: 124 ML/MIN/1.73SQ M
GLUCOSE SERPL-MCNC: 87 MG/DL (ref 65–140)
GLUCOSE UR STRIP-MCNC: NEGATIVE MG/DL
HCT VFR BLD AUTO: 42 % (ref 34.8–46.1)
HGB BLD-MCNC: 13.7 G/DL (ref 11.5–15.4)
HGB UR QL STRIP.AUTO: ABNORMAL
IMM GRANULOCYTES # BLD AUTO: 0.06 THOUSAND/UL (ref 0–0.2)
IMM GRANULOCYTES NFR BLD AUTO: 1 % (ref 0–2)
KETONES UR STRIP-MCNC: NEGATIVE MG/DL
LEUKOCYTE ESTERASE UR QL STRIP: NEGATIVE
LIPASE SERPL-CCNC: 157 U/L (ref 73–393)
LYMPHOCYTES # BLD AUTO: 2.24 THOUSANDS/ΜL (ref 0.6–4.47)
LYMPHOCYTES NFR BLD AUTO: 17 % (ref 14–44)
MCH RBC QN AUTO: 28 PG (ref 26.8–34.3)
MCHC RBC AUTO-ENTMCNC: 32.6 G/DL (ref 31.4–37.4)
MCV RBC AUTO: 86 FL (ref 82–98)
MONOCYTES # BLD AUTO: 0.89 THOUSAND/ΜL (ref 0.17–1.22)
MONOCYTES NFR BLD AUTO: 7 % (ref 4–12)
NEUTROPHILS # BLD AUTO: 9.67 THOUSANDS/ΜL (ref 1.85–7.62)
NEUTS SEG NFR BLD AUTO: 74 % (ref 43–75)
NITRITE UR QL STRIP: NEGATIVE
NON-SQ EPI CELLS URNS QL MICRO: ABNORMAL /HPF
NRBC BLD AUTO-RTO: 0 /100 WBCS
PH UR STRIP.AUTO: 5 [PH] (ref 4.5–8)
PLATELET # BLD AUTO: 331 THOUSANDS/UL (ref 149–390)
PMV BLD AUTO: 9.8 FL (ref 8.9–12.7)
POTASSIUM SERPL-SCNC: 3.4 MMOL/L (ref 3.5–5.3)
PROT SERPL-MCNC: 7.6 G/DL (ref 6.4–8.2)
PROT UR STRIP-MCNC: NEGATIVE MG/DL
RBC # BLD AUTO: 4.9 MILLION/UL (ref 3.81–5.12)
RBC #/AREA URNS AUTO: ABNORMAL /HPF
RH BLD: POSITIVE
SODIUM SERPL-SCNC: 137 MMOL/L (ref 136–145)
SP GR UR STRIP.AUTO: 1.01 (ref 1–1.03)
UROBILINOGEN UR QL STRIP.AUTO: 0.2 E.U./DL
WBC # BLD AUTO: 12.97 THOUSAND/UL (ref 4.31–10.16)
WBC #/AREA URNS AUTO: ABNORMAL /HPF

## 2018-10-23 PROCEDURE — 81001 URINALYSIS AUTO W/SCOPE: CPT

## 2018-10-23 PROCEDURE — 76816 OB US FOLLOW-UP PER FETUS: CPT

## 2018-10-23 PROCEDURE — 83690 ASSAY OF LIPASE: CPT | Performed by: EMERGENCY MEDICINE

## 2018-10-23 PROCEDURE — 85025 COMPLETE CBC W/AUTO DIFF WBC: CPT | Performed by: EMERGENCY MEDICINE

## 2018-10-23 PROCEDURE — 96361 HYDRATE IV INFUSION ADD-ON: CPT

## 2018-10-23 PROCEDURE — 96374 THER/PROPH/DIAG INJ IV PUSH: CPT

## 2018-10-23 PROCEDURE — 99284 EMERGENCY DEPT VISIT MOD MDM: CPT

## 2018-10-23 PROCEDURE — 86900 BLOOD TYPING SEROLOGIC ABO: CPT | Performed by: EMERGENCY MEDICINE

## 2018-10-23 PROCEDURE — 76705 ECHO EXAM OF ABDOMEN: CPT

## 2018-10-23 PROCEDURE — 36415 COLL VENOUS BLD VENIPUNCTURE: CPT | Performed by: EMERGENCY MEDICINE

## 2018-10-23 PROCEDURE — 87086 URINE CULTURE/COLONY COUNT: CPT

## 2018-10-23 PROCEDURE — 80053 COMPREHEN METABOLIC PANEL: CPT | Performed by: EMERGENCY MEDICINE

## 2018-10-23 PROCEDURE — 86901 BLOOD TYPING SEROLOGIC RH(D): CPT | Performed by: EMERGENCY MEDICINE

## 2018-10-23 PROCEDURE — 81025 URINE PREGNANCY TEST: CPT | Performed by: EMERGENCY MEDICINE

## 2018-10-23 RX ORDER — ONDANSETRON 2 MG/ML
4 INJECTION INTRAMUSCULAR; INTRAVENOUS ONCE
Status: COMPLETED | OUTPATIENT
Start: 2018-10-23 | End: 2018-10-23

## 2018-10-23 RX ADMIN — ONDANSETRON 4 MG: 2 INJECTION INTRAMUSCULAR; INTRAVENOUS at 13:42

## 2018-10-23 RX ADMIN — SODIUM CHLORIDE 1000 ML: 0.9 INJECTION, SOLUTION INTRAVENOUS at 13:42

## 2018-10-23 NOTE — PROGRESS NOTES
Assessment/Plan:  Patient appears to be doing well  She likely has mild-to-moderate gastritis  Recommended Zantac twice daily  Continue to follow up with us if symptoms persist or worsen  No problem-specific Assessment & Plan notes found for this encounter  Diagnoses and all orders for this visit:    Gastritis without bleeding, unspecified chronicity, unspecified gastritis type  -     ranitidine (ZANTAC) 150 MG capsule; Take 1 capsule (150 mg total) by mouth 2 (two) times a day    Polycystic ovaries  -     POCT urine dip    Other orders  -     Discontinue: clobetasol (TEMOVATE) 0 05 % cream;   -     Discontinue: drospirenone-ethinyl estradiol (TATY) 3-0 02 MG per tablet;   -     Discontinue: terconazole (TERAZOL 7) 0 4 % vaginal cream;           Subjective:      Patient ID: Jerad Razo is a 29 y o  female  Patient with mild-to-moderate gastritis symptoms over the last 1-2 weeks  She is pregnant  Denies any chest pain or shortness of breath  No edema to the legs  Appetite is normal   She does continue to follow with OBGYN  No edema  Denies any fevers or chills  No abnormal bleeding  Heartburn   She complains of nausea  Hypertension         The following portions of the patient's history were reviewed and updated as appropriate: allergies, current medications, past family history, past medical history, past social history, past surgical history and problem list     Review of Systems   Constitutional: Negative  HENT: Negative  Eyes: Negative  Respiratory: Negative  Cardiovascular: Negative  Gastrointestinal: Positive for nausea  As noted in history of present illness   Endocrine: Negative  Genitourinary: Negative  Musculoskeletal: Negative  Skin: Negative  Allergic/Immunologic: Negative  Neurological: Negative  Hematological: Negative  Psychiatric/Behavioral: Negative            Objective:      /90   Temp 98 8 °F (37 1 °C)   Ht 5' 1" (1 549 m) Comment: pt reported to use from visit at [facility] 114 kg (251 lb) Comment: pt reported to use from visit at [facility] 08/28/2018 (Exact Date)   BMI 47 43 kg/m²          Physical Exam   Constitutional: She is oriented to person, place, and time  She appears well-developed and well-nourished  HENT:   Head: Normocephalic and atraumatic  Right Ear: External ear normal  Tympanic membrane is not erythematous and not bulging  Left Ear: External ear normal  Tympanic membrane is not erythematous and not bulging  Nose: Nose normal    Mouth/Throat: Oropharynx is clear and moist and mucous membranes are normal  No oral lesions  No oropharyngeal exudate  Eyes: Conjunctivae and EOM are normal  Right eye exhibits no discharge  Left eye exhibits no discharge  No scleral icterus  Neck: Normal range of motion  Neck supple  No thyromegaly present  Cardiovascular: Normal rate, regular rhythm and normal heart sounds  Exam reveals no gallop and no friction rub  No murmur heard  Pulmonary/Chest: Effort normal  No respiratory distress  She has no wheezes  She has no rales  She exhibits no tenderness  Abdominal: Soft  Bowel sounds are normal  She exhibits no distension and no mass  There is no tenderness  There is no rebound and no guarding  Musculoskeletal: Normal range of motion  She exhibits no edema, tenderness or deformity  Lymphadenopathy:     She has no cervical adenopathy  Neurological: She is alert and oriented to person, place, and time  She has normal reflexes  No cranial nerve deficit  She exhibits normal muscle tone  Coordination normal    Skin: Skin is warm and dry  No rash noted  No erythema  No pallor  Psychiatric: She has a normal mood and affect  Her behavior is normal    Vitals reviewed

## 2018-10-23 NOTE — ED PROVIDER NOTES
History  Chief Complaint   Patient presents with    Vomiting     Pregnant patient (2months) c/o vomiting, abd cramping, and diarrhea  59-year-old female  at approximately 8 weeks gestation presents to the emergency department for evaluation of nausea, vomiting, and diarrhea  Patient states that the diarrhea has been ongoing and present since she found out that she was pregnant  She has had intermittent nausea and dry heaving  She states that symptoms have intensified since  after eating a large meal   Patient's significant other did not get sick after eating the same meal   Patient does have a history of gallstones  She denies fevers or chills  She has had some intermittent spotting throughout the pregnancy, mostly noted after intercourse but reports that this has resolved   She is currently having epigastric and right upper quadrant abdominal pain  She is taking tums and zantac  Patient states that she is very fatigued but cannot sleep  History provided by:  Patient and significant other   used: No    Abdominal Pain   Pain location:  Epigastric, RUQ and LUQ  Pain quality: cramping    Pain radiates to:  Does not radiate  Pain severity:  Severe  Onset quality:  Gradual  Duration:  3 days  Timing:  Constant  Progression:  Worsening  Chronicity:  New  Context: not alcohol use, not diet changes, not previous surgeries and not sick contacts    Associated symptoms: diarrhea, fatigue, nausea, vaginal bleeding and vomiting    Associated symptoms: no dysuria, no fever, no hematuria and no vaginal discharge        Prior to Admission Medications   Prescriptions Last Dose Informant Patient Reported? Taking?    Prenatal Vit-Fe Fumarate-FA (PRENATAL VITAMIN PO)   Yes No   Sig: Take 1 tablet by mouth daily   ranitidine (ZANTAC) 150 MG capsule   No No   Sig: Take 1 capsule (150 mg total) by mouth 2 (two) times a day      Facility-Administered Medications: None       Past Medical History:   Diagnosis Date    Abnormal Pap smear of cervix     Gallstone     Genital herpes     HPV (human papilloma virus) infection     Hypertension     Polycystic ovary syndrome     Varicella        Past Surgical History:   Procedure Laterality Date    COLPOSCOPY      NO PAST SURGERIES         Family History   Problem Relation Age of Onset    Hypertension Mother     Other Mother         High cholesterol    Brain cancer Mother     Lung cancer Mother     Liver cancer Mother     Pulmonary embolism Mother     Other Maternal Aunt         High cholesterol    No Known Problems Father     Hypertension Maternal Grandmother     Diabetes Maternal Grandmother     Cancer Maternal Grandmother         cervical     I have reviewed and agree with the history as documented  Social History   Substance Use Topics    Smoking status: Never Smoker    Smokeless tobacco: Never Used      Comment: per allscripts - former smoker    Alcohol use Yes      Comment: socially prior to confirmed pregnancy        Review of Systems   Constitutional: Positive for appetite change and fatigue  Negative for fever  Gastrointestinal: Positive for abdominal pain, diarrhea, nausea and vomiting  Genitourinary: Positive for pelvic pain and vaginal bleeding  Negative for dysuria, hematuria and vaginal discharge  Musculoskeletal: Negative for back pain  Psychiatric/Behavioral: Positive for sleep disturbance  All other systems reviewed and are negative  Physical Exam  Physical Exam   Constitutional: She is oriented to person, place, and time  She appears well-developed and well-nourished  No distress  Morbidly obese   HENT:   Head: Normocephalic  Nose: Nose normal    Mouth/Throat: Oropharynx is clear and moist  No oropharyngeal exudate  Eyes: Pupils are equal, round, and reactive to light  Conjunctivae and EOM are normal    Neck: Normal range of motion  Neck supple     Cardiovascular: Regular rhythm, normal heart sounds and intact distal pulses  Tachycardia present  Pulmonary/Chest: Effort normal and breath sounds normal    Abdominal: Soft  Bowel sounds are normal  She exhibits no distension  There is generalized tenderness  There is no rebound and no guarding  Musculoskeletal: Normal range of motion  She exhibits no edema, tenderness or deformity  Lymphadenopathy:     She has no cervical adenopathy  Neurological: She is alert and oriented to person, place, and time  She has normal strength and normal reflexes  No cranial nerve deficit or sensory deficit  She exhibits normal muscle tone  Coordination and gait normal    Skin: Skin is warm, dry and intact  No rash noted  Psychiatric: She has a normal mood and affect  Her behavior is normal  Judgment and thought content normal    Nursing note and vitals reviewed        Vital Signs  ED Triage Vitals   Temperature Pulse Respirations Blood Pressure SpO2   10/23/18 1300 10/23/18 1257 10/23/18 1257 10/23/18 1257 10/23/18 1257   97 5 °F (36 4 °C) 95 18 (!) 171/87 100 %      Temp Source Heart Rate Source Patient Position - Orthostatic VS BP Location FiO2 (%)   10/23/18 1300 10/23/18 1257 10/23/18 1257 10/23/18 1257 --   Oral Monitor Lying Right arm       Pain Score       10/23/18 1257       7           Vitals:    10/23/18 1257 10/23/18 1527   BP: (!) 171/87 130/69   Pulse: 95 103   Patient Position - Orthostatic VS: Lying Lying       Visual Acuity      ED Medications  Medications   sodium chloride 0 9 % bolus 1,000 mL (0 mL Intravenous Stopped 10/23/18 1603)   ondansetron (ZOFRAN) injection 4 mg (4 mg Intravenous Given 10/23/18 1342)       Diagnostic Studies  Results Reviewed     Procedure Component Value Units Date/Time    Urine Microscopic [[id number]]  (Abnormal) Collected:  10/23/18 1356    Lab Status:  Final result Specimen:  Urine from Urine, Clean Catch Updated:  10/23/18 1416     RBC, UA 0-1 (A) /hpf      WBC, UA 0-5 /hpf      Epithelial Cells Occasional /hpf Bacteria, UA Occasional /hpf     Comprehensive metabolic panel [[id number]]  (Abnormal) Collected:  10/23/18 1338    Lab Status:  Final result Specimen:  Blood from Arm, Right Updated:  10/23/18 1405     Sodium 137 mmol/L      Potassium 3 4 (L) mmol/L      Chloride 101 mmol/L      CO2 25 mmol/L      ANION GAP 11 mmol/L      BUN 9 mg/dL      Creatinine 0 60 mg/dL      Glucose 87 mg/dL      Calcium 9 0 mg/dL      AST 11 U/L      ALT 27 U/L      Alkaline Phosphatase 86 U/L      Total Protein 7 6 g/dL      Albumin 2 9 (L) g/dL      Total Bilirubin 0 10 (L) mg/dL      eGFR 124 ml/min/1 73sq m     Narrative:         National Kidney Disease Education Program recommendations are as follows:  GFR calculation is accurate only with a steady state creatinine  Chronic Kidney disease less than 60 ml/min/1 73 sq  meters  Kidney failure less than 15 ml/min/1 73 sq  meters  Lipase [[id number]]  (Normal) Collected:  10/23/18 1338    Lab Status:  Final result Specimen:  Blood from Arm, Right Updated:  10/23/18 1405     Lipase 157 u/L     CBC and differential [[id number]]  (Abnormal) Collected:  10/23/18 1338    Lab Status:  Final result Specimen:  Blood from Arm, Right Updated:  10/23/18 1348     WBC 12 97 (H) Thousand/uL      RBC 4 90 Million/uL      Hemoglobin 13 7 g/dL      Hematocrit 42 0 %      MCV 86 fL      MCH 28 0 pg      MCHC 32 6 g/dL      RDW 13 1 %      MPV 9 8 fL      Platelets 552 Thousands/uL      nRBC 0 /100 WBCs      Neutrophils Relative 74 %      Immat GRANS % 1 %      Lymphocytes Relative 17 %      Monocytes Relative 7 %      Eosinophils Relative 1 %      Basophils Relative 0 %      Neutrophils Absolute 9 67 (H) Thousands/µL      Immature Grans Absolute 0 06 Thousand/uL      Lymphocytes Absolute 2 24 Thousands/µL      Monocytes Absolute 0 89 Thousand/µL      Eosinophils Absolute 0 08 Thousand/µL      Basophils Absolute 0 03 Thousands/µL     Urine culture [[id number]] Collected:  10/23/18 1356    Lab Status:   In process Specimen:  Urine from Urine, Clean Catch Updated:  10/23/18 1346    POCT pregnancy, urine [[id number]]  (Abnormal) Resulted:  10/23/18 1343    Lab Status:  Final result Updated:  10/23/18 1344     EXT PREG TEST UR (Ref: Negative) positive    ED Urine Macroscopic [[id number]]  (Abnormal) Collected:  10/23/18 1356    Lab Status:  Final result Specimen:  Urine Updated:  10/23/18 1342     Color, UA Yellow     Clarity, UA Clear     pH, UA 5 0     Leukocytes, UA Negative     Nitrite, UA Negative     Protein, UA Negative mg/dl      Glucose, UA Negative mg/dl      Ketones, UA Negative mg/dl      Urobilinogen, UA 0 2 E U /dl      Bilirubin, UA Negative     Blood, UA Trace (A)     Specific [place], UA 1 015    Narrative:       CLINITEK RESULT                 US gallbladder   Final Result by Janelle Caballero MD (10/23 1521)      Gallbladder completely full of multiple mobile shadowing calculi  No wall thickening  Negative Kim's sign  Workstation performed: [id number]         US OB follow up transabdominal approach   Final Result by Janelle Caballero MD (10/23 1520)      Single live intrauterine gestation at 8 weeks 0 days (range +/- 3 days)      Interval growth within expected range of normal   The LALO remains 6/4/2019, as determined on the initial scan  Small subchorionic hemorrhage noted superior to the sac measuring 7 x 4 mm           Workstation performed: [id number]                    Procedures  Procedures       Phone Contacts  ED Phone Contact    ED Course                               MDM  Number of Diagnoses or Management Options  Cholelithiasis: new and requires workup  First trimester pregnancy: new and requires workup  Gastroenteritis: new and requires workup  GERD (gastroesophageal reflux disease): new and requires workup     Amount and/or Complexity of Data Reviewed  Clinical lab tests: ordered and reviewed  Tests in the radiology section of CPT®: ordered and reviewed  Decide to obtain previous medical records or to obtain history from someone other than the patient: yes  Independent visualization of images, tracings, or specimens: yes    Risk of Complications, Morbidity, and/or Mortality  General comments: 55-year-old female, first-trimester pregnancy with chronic nausea, intermittent dry heaving and chronic diarrhea now with generalized abdominal pain and pelvic cramping  Patient's workup in the department demonstrates multiple mobile gallstones the, no sign of acute cholecystitis  Patient has a normal appearing intrauterine gestation with size appropriate for dates  There is a small subchorionic hematoma noted  I did inform patient of this finding  I had a long discussion with the patient regarding her current diet  I suggested she stop attempting to consume large meals and fried and fatty foods  She will start to have bland diet and small frequent meals when not feeling nauseated  Patient does have close short-term follow-up with her OBGYN  Discussed signs and symptoms to return to the emergency department  Patient Progress  Patient progress: stable    CritCare Time    Disposition  Final diagnoses:   Gastroenteritis   GERD (gastroesophageal reflux disease)   First trimester pregnancy   Cholelithiasis     Time reflects when diagnosis was documented in both MDM as applicable and the Disposition within this note     Time User Action Codes Description Comment    10/23/2018  3:46 PM Alize Shelton [K52 9] Gastroenteritis     10/23/2018  3:46 PM Alize Shelton [K21 9] GERD (gastroesophageal reflux disease)     10/23/2018  3:47 PM Alize Shelton [Z34 91] First trimester pregnancy     10/23/2018  3:48 PM Alize Shelton [K80 20] Cholelithiasis       ED Disposition     ED Disposition Condition Comment    Discharge  Genevieve Conley discharge to home/self care      Condition at discharge: Stable        Follow-up Information     Follow up With Specialties Details Why [id number] [facility], DO Obstetrics and Gynecology, Obstetrics, Gynecology Schedule an appointment as soon as possible for a visit in 1 week For recheck of current symptoms [address] [place] [address] 105  [phone]            Discharge Medication List as of 10/23/2018  3:49 PM      CONTINUE these medications which have NOT CHANGED    Details   Prenatal Vit-Fe Fumarate-FA (PRENATAL VITAMIN PO) Take 1 tablet by mouth daily, Historical Med      ranitidine (ZANTAC) 150 MG capsule Take 1 capsule (150 mg total) by mouth 2 (two) times a day, Starting Thu 10/11/2018, Normal           No discharge procedures on file      ED Provider  Electronically Signed by           Reece Vazquez DO  10/23/18 6544

## 2018-10-23 NOTE — DISCHARGE INSTRUCTIONS
Gallstones   WHAT YOU NEED TO KNOW:   Gallstones, also called cholelithiasis, are hard substances that form in your gallbladder or bile duct  Your gallbladder and bile duct are located on the right side of your abdomen, near your liver  Your gallbladder stores bile, which helps break down the fat that you eat  Your gallbladder also helps remove certain chemicals from your body  DISCHARGE INSTRUCTIONS:   Medicines:   · Prescription pain medicine  may be given  Ask your healthcare provider how to take this medicine safely  · Take your medicine as directed  Contact your healthcare provider if you think your medicine is not helping or if you have side effects  Tell him if you are allergic to any medicine  Keep a list of the medicines, vitamins, and herbs you take  Include the amounts, and when and why you take them  Bring the list or the pill bottles to follow-up visits  Carry your medicine list with you in case of an emergency  Follow up with your healthcare provider as directed:  Write down your questions so you remember to ask them during your visits  Eat a variety of healthy foods: This may help you have more energy and heal faster  Healthy foods include fruit, vegetables, whole-grain breads, low-fat dairy products, beans, lean meat, and fish  Ask if you need to be on a special diet  Exercise as directed:  Talk to your healthcare provider about the best exercise plan for you  Exercise can help you lose weight and improve your health  Contact your healthcare provider if:   · You have nausea and are vomiting  · Your urine is dark  · You have darrel-colored bowel movements  · You have questions or concerns about your condition or care  Return to the emergency department if:   · You have a fever and chills  · Your skin or eyes turn yellow  · You have severe pain in your upper abdomen, just below the right ribcage    © 2017 [employer] Information is for End User's use only and may not be sold, redistributed or otherwise used for commercial purposes  All illustrations and images included in CareNotes® are the copyrighted property of A D [employer] M [employer] Inc  or [employer]  The above information is an  only  It is not intended as medical advice for individual conditions or treatments  Talk to your doctor, nurse or pharmacist before following any medical regimen to see if it is safe and effective for you  Gastroenteritis   WHAT YOU NEED TO KNOW:   What is gastroenteritis? Gastroenteritis, or stomach flu, is an infection of the stomach and intestines  It is caused by bacteria, parasites, or viruses  What increases my risk for gastroenteritis? · Close contact with an infected person or animal    · Food poisoning, such as from eggs, raw vegetables, shellfish, or meat that is not fully cooked    · Drinking water that is not clean, such as when you camp or travel  What are the signs and symptoms of gastroenteritis? · Diarrhea or gas    · Nausea, vomiting, or poor appetite    · Abdominal cramps, pain, or gurgling    · Fever    · Tiredness or weakness    · Headaches or muscle aches with any of the above symptoms  How is gastroenteritis diagnosed and treated? Your healthcare provider will examine you  He will check for signs of dehydration  He will ask you how often you are vomiting or have diarrhea  You may need a blood or bowel movement sample tested for the germ causing your gastroenteritis  Gastroenteritis often clears up on its own  Medicines may be given to slow or stop your diarrhea or vomiting  You may also need medicines to treat an infection caused by bacteria or a parasite  How can I manage my gastroenteritis? · Drink liquids as directed  Ask your healthcare provider how much liquid to drink each day, and which liquids are best for you  You may also need to drink an oral rehydration solution (ORS)   An ORS has the right amounts of sugar, salt, and minerals in water to replace body fluids  · Eat bland foods  When you feel hungry, begin eating soft, bland foods  Examples are bananas, clear soup, potatoes, and applesauce  Do not have dairy products, alcohol, sugary drinks, or drinks with caffeine until you feel better  · Rest as much as possible  Slowly start to do more each day when you begin to feel better  How can I prevent gastroenteritis? Gastroenteritis can spread easily  Keep yourself, your family, and your surroundings clean to help prevent the spread of gastroenteritis:  · Wash your hands often  Use soap and water  Wash your hands after you use the bathroom, change a child's diapers, or sneeze  Wash your hands before you prepare or eat food  · Clean surfaces and do laundry often  Wash your clothes and towels separately from the rest of the laundry  Clean surfaces in your home with antibacterial  or bleach  · Clean food thoroughly and cook safely  Wash raw vegetables before you cook  Cook meat, fish, and eggs fully  Do not use the same dishes for raw meat as you do for other foods  Refrigerate any leftover food immediately  · Be aware when you camp or travel  Drink only clean water  Do not drink from rivers or lakes unless you purify or boil the water first  When you travel, drink bottled water and do not add ice  Do not eat fruit that has not been peeled  Do not eat raw fish or meat that is not fully cooked  Call 911 for any of the following:   · You have trouble breathing or a very fast pulse  When should I seek immediate care? · You see blood in your diarrhea  · You cannot stop vomiting  · You have not urinated for 12 hours  · You feel like you are going to faint  When should I contact my healthcare provider? · You have a fever  · You continue to vomit or have diarrhea, even after treatment  · You see worms in your diarrhea  · Your mouth or eyes are dry   You are not urinating as much or as often  · You have questions or concerns about your condition or care  CARE AGREEMENT:   You have the right to help plan your care  Learn about your health condition and how it may be treated  Discuss treatment options with your caregivers to decide what care you want to receive  You always have the right to refuse treatment  The above information is an  only  It is not intended as medical advice for individual conditions or treatments  Talk to your doctor, nurse or pharmacist before following any medical regimen to see if it is safe and effective for you  © 2017 [employer]  Information is for End User's use only and may not be sold, redistributed or otherwise used for commercial purposes  All illustrations and images included in CareNotes® are the copyrighted property of [employer]  or [employer]  Gastroesophageal Reflux Disease   WHAT YOU NEED TO KNOW:   Gastroesophageal reflux occurs when acid and food in the stomach back up into the esophagus  Gastroesophageal reflux disease (GERD) is reflux that occurs more than twice a week for a few weeks  It usually causes heartburn and other symptoms  GERD can cause other health problems over time if it is not treated  DISCHARGE INSTRUCTIONS:   Seek care immediately if:   · You feel full and cannot burp or vomit  · You have severe chest pain and sudden trouble breathing  · Your bowel movements are black, bloody, or tarry-looking  · Your vomit looks like coffee grounds or has blood in it  Contact your healthcare provider if:   · You vomit large amounts, or you vomit often  · You have trouble breathing after you vomit  · You have trouble swallowing, or pain with swallowing  · You are losing weight without trying  · Your symptoms get worse or do not improve with treatment  · You have questions or concerns about your condition or care    Medicines:   · Medicines  are used to decrease stomach acid  Medicine may also be used to help your lower esophageal sphincter and stomach contract (tighten) more  · Take your medicine as directed  Contact your healthcare provider if you think your medicine is not helping or if you have side effects  Tell him or her if you are allergic to any medicine  Keep a list of the medicines, vitamins, and herbs you take  Include the amounts, and when and why you take them  Bring the list or the pill bottles to follow-up visits  Carry your medicine list with you in case of an emergency  Manage GERD:   · Do not have foods or drinks that may increase heartburn  These include chocolate, peppermint, fried or fatty foods, drinks that contain caffeine, or carbonated drinks (soda)  Other foods include spicy foods, onions, tomatoes, and tomato-based foods  Do not have foods or drinks that can irritate your esophagus, such as citrus fruits, juices, and alcohol  · Do not eat large meals  When you eat a lot of food at one time, your stomach needs more acid to digest it  Eat 6 small meals each day instead of 3 large ones, and eat slowly  Do not eat meals 2 to 3 hours before bedtime  · Elevate the head of your bed  Place 6-inch blocks under the head of your bed frame  You may also use more than one pillow under your head and shoulders while you sleep  · Maintain a healthy weight  If you are overweight, weight loss may help relieve symptoms of GERD  · Do not smoke  Smoking weakens the lower esophageal sphincter and increases the risk of GERD  Ask your healthcare provider for information if you currently smoke and need help to quit  E-cigarettes or smokeless tobacco still contain nicotine  Talk to your healthcare provider before you use these products  · Do not wear clothing that is tight around your waist   Tight clothing can put pressure on your stomach and cause or worsen GERD symptoms    Follow up with your healthcare provider as directed:  Write down your questions so you remember to ask them during your visits  © 2017 [employer] [employer] Information is for End User's use only and may not be sold, redistributed or otherwise used for commercial purposes  All illustrations and images included in CareNotes® are the copyrighted property of [employer] M [employer] Inc  or [employer]  The above information is an  only  It is not intended as medical advice for individual conditions or treatments  Talk to your doctor, nurse or pharmacist before following any medical regimen to see if it is safe and effective for you

## 2018-10-24 ENCOUNTER — TELEPHONE (OUTPATIENT)
Dept: OBGYN CLINIC | Facility: CLINIC | Age: 28
End: 2018-10-24

## 2018-10-24 LAB — BACTERIA UR CULT: NORMAL

## 2018-10-24 NOTE — TELEPHONE ENCOUNTER
8wk OB went to ED for nausea, vomiting and diarrhea  She was in ED last night dx with cholelithiasis  She was also given an ultrasound which show a small subchorionic hemorrhage  Advised this should resolve itself  Advised to follow instruction on diet from ED  Call with any other problems  Routing to provider for advise

## 2018-11-03 ENCOUNTER — LAB (OUTPATIENT)
Dept: LAB | Facility: HOSPITAL | Age: 28
End: 2018-11-03
Attending: OBSTETRICS & GYNECOLOGY
Payer: COMMERCIAL

## 2018-11-03 DIAGNOSIS — I10 CHRONIC HYPERTENSION: ICD-10-CM

## 2018-11-03 DIAGNOSIS — Z3A.01 LESS THAN 8 WEEKS GESTATION OF PREGNANCY: ICD-10-CM

## 2018-11-03 DIAGNOSIS — Z34.01 ENCOUNTER FOR SUPERVISION OF NORMAL FIRST PREGNANCY IN FIRST TRIMESTER: ICD-10-CM

## 2018-11-03 LAB
ABO GROUP BLD: NORMAL
ALBUMIN SERPL BCP-MCNC: 2.9 G/DL (ref 3.5–5)
ALP SERPL-CCNC: 79 U/L (ref 46–116)
ALT SERPL W P-5'-P-CCNC: 24 U/L (ref 12–78)
ANION GAP SERPL CALCULATED.3IONS-SCNC: 4 MMOL/L (ref 4–13)
AST SERPL W P-5'-P-CCNC: 9 U/L (ref 5–45)
BASOPHILS # BLD AUTO: 0.03 THOUSANDS/ΜL (ref 0–0.1)
BASOPHILS NFR BLD AUTO: 0 % (ref 0–1)
BILIRUB SERPL-MCNC: 0.38 MG/DL (ref 0.2–1)
BILIRUB UR QL STRIP: NEGATIVE
BLD GP AB SCN SERPL QL: NEGATIVE
BUN SERPL-MCNC: 6 MG/DL (ref 5–25)
CALCIUM SERPL-MCNC: 8.4 MG/DL (ref 8.3–10.1)
CHLORIDE SERPL-SCNC: 102 MMOL/L (ref 100–108)
CLARITY UR: NORMAL
CO2 SERPL-SCNC: 27 MMOL/L (ref 21–32)
COLOR UR: YELLOW
CREAT SERPL-MCNC: 0.58 MG/DL (ref 0.6–1.3)
CREAT UR-MCNC: 213 MG/DL
EOSINOPHIL # BLD AUTO: 0.08 THOUSAND/ΜL (ref 0–0.61)
EOSINOPHIL NFR BLD AUTO: 1 % (ref 0–6)
ERYTHROCYTE [DISTWIDTH] IN BLOOD BY AUTOMATED COUNT: 13.4 % (ref 11.6–15.1)
GFR SERPL CREATININE-BSD FRML MDRD: 126 ML/MIN/1.73SQ M
GLUCOSE 1H P 50 G GLC PO SERPL-MCNC: 100 MG/DL
GLUCOSE P FAST SERPL-MCNC: 93 MG/DL (ref 65–99)
GLUCOSE UR STRIP-MCNC: NEGATIVE MG/DL
HBV SURFACE AG SER QL: NORMAL
HCT VFR BLD AUTO: 42.2 % (ref 34.8–46.1)
HGB BLD-MCNC: 13.4 G/DL (ref 11.5–15.4)
HGB UR QL STRIP.AUTO: NEGATIVE
IMM GRANULOCYTES # BLD AUTO: 0.07 THOUSAND/UL (ref 0–0.2)
IMM GRANULOCYTES NFR BLD AUTO: 1 % (ref 0–2)
KETONES UR STRIP-MCNC: NEGATIVE MG/DL
LEUKOCYTE ESTERASE UR QL STRIP: NEGATIVE
LYMPHOCYTES # BLD AUTO: 1.88 THOUSANDS/ΜL (ref 0.6–4.47)
LYMPHOCYTES NFR BLD AUTO: 20 % (ref 14–44)
MCH RBC QN AUTO: 27.7 PG (ref 26.8–34.3)
MCHC RBC AUTO-ENTMCNC: 31.8 G/DL (ref 31.4–37.4)
MCV RBC AUTO: 87 FL (ref 82–98)
MONOCYTES # BLD AUTO: 0.7 THOUSAND/ΜL (ref 0.17–1.22)
MONOCYTES NFR BLD AUTO: 8 % (ref 4–12)
NEUTROPHILS # BLD AUTO: 6.46 THOUSANDS/ΜL (ref 1.85–7.62)
NEUTS SEG NFR BLD AUTO: 70 % (ref 43–75)
NITRITE UR QL STRIP: NEGATIVE
NRBC BLD AUTO-RTO: 0 /100 WBCS
PH UR STRIP.AUTO: 7 [PH] (ref 4.5–8)
PLATELET # BLD AUTO: 318 THOUSANDS/UL (ref 149–390)
PMV BLD AUTO: 10.1 FL (ref 8.9–12.7)
POTASSIUM SERPL-SCNC: 3.1 MMOL/L (ref 3.5–5.3)
PROT SERPL-MCNC: 7.2 G/DL (ref 6.4–8.2)
PROT UR STRIP-MCNC: NEGATIVE MG/DL
PROT UR-MCNC: 12 MG/DL
PROT/CREAT UR: 0.06 MG/G{CREAT} (ref 0–0.1)
RBC # BLD AUTO: 4.83 MILLION/UL (ref 3.81–5.12)
RH BLD: POSITIVE
RUBV IGG SERPL IA-ACNC: 62 IU/ML
SODIUM SERPL-SCNC: 133 MMOL/L (ref 136–145)
SP GR UR STRIP.AUTO: 1.02 (ref 1–1.03)
SPECIMEN EXPIRATION DATE: NORMAL
URATE SERPL-MCNC: 2.2 MG/DL (ref 2–6.8)
UROBILINOGEN UR QL STRIP.AUTO: 0.2 E.U./DL
WBC # BLD AUTO: 9.22 THOUSAND/UL (ref 4.31–10.16)

## 2018-11-03 PROCEDURE — 82570 ASSAY OF URINE CREATININE: CPT

## 2018-11-03 PROCEDURE — 86787 VARICELLA-ZOSTER ANTIBODY: CPT

## 2018-11-03 PROCEDURE — 36415 COLL VENOUS BLD VENIPUNCTURE: CPT

## 2018-11-03 PROCEDURE — 87086 URINE CULTURE/COLONY COUNT: CPT

## 2018-11-03 PROCEDURE — 84156 ASSAY OF PROTEIN URINE: CPT

## 2018-11-03 PROCEDURE — 80053 COMPREHEN METABOLIC PANEL: CPT

## 2018-11-03 PROCEDURE — 80081 OBSTETRIC PANEL INC HIV TSTG: CPT

## 2018-11-03 PROCEDURE — 82950 GLUCOSE TEST: CPT

## 2018-11-03 PROCEDURE — 84550 ASSAY OF BLOOD/URIC ACID: CPT

## 2018-11-03 PROCEDURE — 81003 URINALYSIS AUTO W/O SCOPE: CPT

## 2018-11-04 LAB — BACTERIA UR CULT: NORMAL

## 2018-11-05 LAB
HIV 1+2 AB+HIV1 P24 AG SERPL QL IA: NORMAL
RPR SER QL: NORMAL

## 2018-11-06 ENCOUNTER — TELEPHONE (OUTPATIENT)
Dept: FAMILY MEDICINE CLINIC | Facility: CLINIC | Age: 28
End: 2018-11-06

## 2018-11-06 DIAGNOSIS — E87.6 HYPOKALEMIA: Primary | ICD-10-CM

## 2018-11-06 LAB — VZV IGG SER IA-ACNC: NORMAL

## 2018-11-06 RX ORDER — POTASSIUM CHLORIDE 1.5 G/1.77G
20 POWDER, FOR SOLUTION ORAL 2 TIMES DAILY
Qty: 14 PACKET | Refills: 0 | Status: SHIPPED | OUTPATIENT
Start: 2018-11-06 | End: 2018-12-01

## 2018-11-06 NOTE — PROGRESS NOTES
Normal PN panel  Mild hypokalemia likely from recent diarrhea - rx for potassium supplement sent to pharmacy    Recheck at next visit

## 2018-11-06 NOTE — TELEPHONE ENCOUNTER
Pt called, stating that you prescribed Zantac for her acid reflux, but it only works for a couple of hours and then it comes back  She states that she also takes Tums, to help with the gas and bloating  She wants to know if there is anything else you can prescribe  Pt is 10 weeks pregnant  Please advise  Thank you

## 2018-11-06 NOTE — TELEPHONE ENCOUNTER
She can take the Zantac up to 3 times a day as needed  Recommend follow-up with gastroenterologist if symptoms are persisting

## 2018-11-08 ENCOUNTER — TELEPHONE (OUTPATIENT)
Dept: OBGYN CLINIC | Facility: CLINIC | Age: 28
End: 2018-11-08

## 2018-11-08 NOTE — TELEPHONE ENCOUNTER
Reviewed test results with patient  Advised she will need potassium supple 2x daily for 7 days  Prescription was sent to [facility] - she will have transferred to [facility]   Verbalized  Understanding

## 2018-11-09 DIAGNOSIS — K29.70 GASTRITIS WITHOUT BLEEDING, UNSPECIFIED CHRONICITY, UNSPECIFIED GASTRITIS TYPE: ICD-10-CM

## 2018-11-09 RX ORDER — RANITIDINE 150 MG/1
150 CAPSULE ORAL 2 TIMES DAILY
Qty: 60 CAPSULE | Refills: 0 | Status: SHIPPED | OUTPATIENT
Start: 2018-11-09 | End: 2018-12-01

## 2018-11-12 ENCOUNTER — TELEPHONE (OUTPATIENT)
Dept: OBGYN CLINIC | Facility: CLINIC | Age: 28
End: 2018-11-12

## 2018-11-12 NOTE — TELEPHONE ENCOUNTER
10w3d c/o vaginal irritation and external itching that comes and goes  Started with yellowish discharge  Advised Monistat 7  Would like to wait until appt on Wednesday to be checked  No change in pelvic cramping and backaches  Routing to provider for further advise

## 2018-11-14 ENCOUNTER — ROUTINE PRENATAL (OUTPATIENT)
Dept: OBGYN CLINIC | Facility: CLINIC | Age: 28
End: 2018-11-14
Payer: COMMERCIAL

## 2018-11-14 VITALS — WEIGHT: 254 LBS | BODY MASS INDEX: 49.61 KG/M2 | DIASTOLIC BLOOD PRESSURE: 84 MMHG | SYSTOLIC BLOOD PRESSURE: 130 MMHG

## 2018-11-14 DIAGNOSIS — K52.9 CHRONIC DIARRHEA: ICD-10-CM

## 2018-11-14 DIAGNOSIS — Z11.3 SCREENING FOR STD (SEXUALLY TRANSMITTED DISEASE): ICD-10-CM

## 2018-11-14 DIAGNOSIS — Z34.01 ENCOUNTER FOR SUPERVISION OF NORMAL FIRST PREGNANCY IN FIRST TRIMESTER: Primary | ICD-10-CM

## 2018-11-14 DIAGNOSIS — R12 HEART BURN: ICD-10-CM

## 2018-11-14 DIAGNOSIS — Z23 NEED FOR INFLUENZA VACCINATION: ICD-10-CM

## 2018-11-14 PROCEDURE — PNV: Performed by: OBSTETRICS & GYNECOLOGY

## 2018-11-14 PROCEDURE — 90471 IMMUNIZATION ADMIN: CPT | Performed by: OBSTETRICS & GYNECOLOGY

## 2018-11-14 PROCEDURE — 87591 N.GONORRHOEAE DNA AMP PROB: CPT | Performed by: OBSTETRICS & GYNECOLOGY

## 2018-11-14 PROCEDURE — 90686 IIV4 VACC NO PRSV 0.5 ML IM: CPT | Performed by: OBSTETRICS & GYNECOLOGY

## 2018-11-14 PROCEDURE — 87491 CHLMYD TRACH DNA AMP PROBE: CPT | Performed by: OBSTETRICS & GYNECOLOGY

## 2018-11-14 RX ORDER — PANTOPRAZOLE SODIUM 20 MG/1
20 TABLET, DELAYED RELEASE ORAL DAILY
Qty: 90 TABLET | Refills: 0 | Status: SHIPPED | OUTPATIENT
Start: 2018-11-14 | End: 2019-02-07 | Stop reason: SDUPTHER

## 2018-11-14 NOTE — PROGRESS NOTES
29 y o    female at 7 4 wga EGA for PNV  BP : 130/84  TW  Prenatal H&P  Reviewed labs  GC chlamydia were collected  Pap not due till   Patient has appointment for sequential screen  Discussed weight gain in pregnancy  Ideally 15-25 lb  Recommended patient to have 30 minutes of cardio exercise per day  Chronic hypertension - baseline preeclamptic labs within normal limits  Patient to start baby aspirin at 12 weeks  Chronic diarrhea - patient currently taking potassium replacement  Discussed diet changes to avoid greasy fatty foods  She will have a daily been an as well as start probiotic yogurt  Repeat CMP ordered  Referral placed for GI  Gave note for work    Flu vaccine given  Follow-up in 4 weeks

## 2018-11-14 NOTE — PROGRESS NOTES
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

## 2018-11-16 ENCOUNTER — TELEPHONE (OUTPATIENT)
Dept: LABOR AND DELIVERY | Facility: HOSPITAL | Age: 28
End: 2018-11-16

## 2018-11-16 LAB
C TRACH DNA SPEC QL NAA+PROBE: NEGATIVE
N GONORRHOEA DNA SPEC QL NAA+PROBE: NEGATIVE

## 2018-11-16 NOTE — TELEPHONE ENCOUNTER
Patient called  line reporting redness, swelling at injection site from flu shot on Wednesday, 11/14  Also had slightly elevated temperature to 99 8  Advised cold pack, tylenol  Seek urgent care if redness continues to spread or if she is febrile despite taking tylenol

## 2018-11-26 ENCOUNTER — TELEPHONE (OUTPATIENT)
Dept: OBGYN CLINIC | Facility: CLINIC | Age: 28
End: 2018-11-26

## 2018-11-26 NOTE — TELEPHONE ENCOUNTER
Spoke with patient she states she is calling for appt - [facility] appt 11/28 and working on GI due to work schedule  Routing to provider to update

## 2018-11-26 NOTE — TELEPHONE ENCOUNTER
12w OB calling states she was advised to start an OTC medication in second trimester and unsure of what she is to take  Per last office visit patient to start low dose ASA 81 mg daily  Verbalized understanding

## 2018-11-26 NOTE — TELEPHONE ENCOUNTER
----- Message from [address], [place] sent at 11/26/2018  1:11 PM EST -----  Please call pt to have her make an appt with [facility] and GI - chronic diarrhea in preg

## 2018-11-27 DIAGNOSIS — J02.9 SORE THROAT: Primary | ICD-10-CM

## 2018-11-27 PROCEDURE — 87070 CULTURE OTHR SPECIMN AEROBIC: CPT | Performed by: FAMILY MEDICINE

## 2018-11-28 ENCOUNTER — ROUTINE PRENATAL (OUTPATIENT)
Dept: PERINATAL CARE | Facility: CLINIC | Age: 28
End: 2018-11-28
Payer: COMMERCIAL

## 2018-11-28 VITALS
WEIGHT: 257.6 LBS | SYSTOLIC BLOOD PRESSURE: 137 MMHG | BODY MASS INDEX: 50.58 KG/M2 | HEIGHT: 60 IN | DIASTOLIC BLOOD PRESSURE: 95 MMHG | HEART RATE: 114 BPM

## 2018-11-28 DIAGNOSIS — Z3A.13 13 WEEKS GESTATION OF PREGNANCY: ICD-10-CM

## 2018-11-28 DIAGNOSIS — O99.210 OBESITY AFFECTING PREGNANCY, ANTEPARTUM: Primary | ICD-10-CM

## 2018-11-28 DIAGNOSIS — Z36.82 ENCOUNTER FOR NUCHAL TRANSLUCENCY TESTING: ICD-10-CM

## 2018-11-28 PROCEDURE — 99241 PR OFFICE CONSULTATION NEW/ESTAB PATIENT 15 MIN: CPT | Performed by: OBSTETRICS & GYNECOLOGY

## 2018-11-28 PROCEDURE — 76813 OB US NUCHAL MEAS 1 GEST: CPT | Performed by: OBSTETRICS & GYNECOLOGY

## 2018-11-29 ENCOUNTER — TELEPHONE (OUTPATIENT)
Dept: OBGYN CLINIC | Facility: CLINIC | Age: 28
End: 2018-11-29

## 2018-11-29 NOTE — TELEPHONE ENCOUNTER
13w2d OB calling states she is having headache, sinus congestion, sore throat  States she takes a baby aspirin daily wants to know if it is okay to also take tylenol  Advised she can take tylenol  States headaches are more sinus  Advised normal saline spray, sudafed, warm salt water gargle  Denies any blurred vision, epigastric pain or edema  Advised if not better with OTC call PCP for an appointment  Verbalized understanding  Routing to provider for further advise

## 2018-11-30 LAB — BACTERIA THROAT CULT: NORMAL

## 2018-12-01 ENCOUNTER — OFFICE VISIT (OUTPATIENT)
Dept: URGENT CARE | Age: 28
End: 2018-12-01
Payer: COMMERCIAL

## 2018-12-01 VITALS
DIASTOLIC BLOOD PRESSURE: 78 MMHG | HEIGHT: 61 IN | HEART RATE: 83 BPM | OXYGEN SATURATION: 100 % | SYSTOLIC BLOOD PRESSURE: 130 MMHG | RESPIRATION RATE: 16 BRPM | WEIGHT: 249 LBS | TEMPERATURE: 98.3 F | BODY MASS INDEX: 47.01 KG/M2

## 2018-12-01 DIAGNOSIS — J01.90 ACUTE SINUSITIS, RECURRENCE NOT SPECIFIED, UNSPECIFIED LOCATION: Primary | ICD-10-CM

## 2018-12-01 PROCEDURE — 99213 OFFICE O/P EST LOW 20 MIN: CPT | Performed by: FAMILY MEDICINE

## 2018-12-01 PROCEDURE — S9088 SERVICES PROVIDED IN URGENT: HCPCS | Performed by: FAMILY MEDICINE

## 2018-12-01 RX ORDER — ACETAMINOPHEN 325 MG/1
650 TABLET ORAL EVERY 6 HOURS PRN
COMMUNITY
End: 2019-01-07

## 2018-12-01 RX ORDER — AMOXICILLIN 400 MG/5ML
500 POWDER, FOR SUSPENSION ORAL 3 TIMES DAILY
Qty: 185 ML | Refills: 0 | Status: SHIPPED | OUTPATIENT
Start: 2018-12-01 | End: 2018-12-11

## 2018-12-01 RX ORDER — CALCIUM CARBONATE 200(500)MG
1 TABLET,CHEWABLE ORAL DAILY
COMMUNITY
End: 2019-04-23 | Stop reason: ALTCHOICE

## 2018-12-01 NOTE — PROGRESS NOTES
[facility]        NAME: Phillip Larose is a 29 y o  female  : 1990    MRN: [id number]  DATE: 2018  TIME: 12:01 PM    Assessment and Plan   Acute sinusitis, recurrence not specified, unspecified location [J01 90]  1  Acute sinusitis, recurrence not specified, unspecified location  amoxicillin (AMOXIL) 400 MG/5ML suspension         Patient Instructions     Patient Instructions   Rest and drink extra fluids  Start antibiotic  Tylenol as needed  Nasal saline flushes  Humidification  Warm or cold fluids can help with sore throat  Follow up with PCP if no improvement  Go to ER worsening  Chief Complaint     Chief Complaint   Patient presents with    Sore Throat     pt reports she has been on and off sore throat for over a week and has been taking tylenol for it, had a strep culture pulled this week and contacted yesterday that the results were negative    Generalized Body Aches         History of Present Illness   Phillip Larose presents to the clinic c/o    This is a 29year old female here today with sore throat on and off for over 1 week  She also is having cough and congestion  She states she has sinus pressure and congestion  Tmax 99 ? She has been using Tylenol as needed  She states she had strep culture done which she was told was negative yesterday  She has having yellow mucous  She states she is having body aches and feels as though a train hit her  She had some pain in right upper back and right arm this morning  She spoke with her ob/gyn and was recommend she take pseudofed to help with congestion  She has sinus headache  She states she has tolerated amoxicillin in past and has no allergy  She is 14 weeks pregnant  Review of Systems   Review of Systems   Constitutional: Positive for activity change, chills, fatigue and fever  HENT: Positive for congestion, sinus pain, sinus pressure and sore throat  Respiratory: Positive for cough   Negative for choking, chest tightness and wheezing  Neurological: Negative  Psychiatric/Behavioral: Negative  Current Medications     Long-Term Prescriptions   Medication Sig Dispense Refill    aspirin (ASPIRIN LOW DOSE) 81 MG tablet       pantoprazole (PROTONIX) 20 mg tablet Take 1 tablet (20 mg total) by mouth daily for 90 days 90 tablet 0       Current Allergies     Allergies as of 12/01/2018 - Reviewed 12/01/2018   Allergen Reaction Noted    Erythromycin Shortness Of Breath 10/26/2015    Levonorgestrel-ethinyl estrad Hypertension 05/22/2017    Penicillins Other (See Comments) 10/26/2015    Medical tape Rash 04/28/2017            The following portions of the patient's history were reviewed and updated as appropriate: allergies, current medications, past family history, past medical history, past social history, past surgical history and problem list     Objective   /78 (BP Location: Right arm, Patient Position: Sitting, Cuff Size: Extra-Large)   Pulse 83   Temp 98 3 °F (36 8 °C) (Oral)   Resp 16   Ht 5' 1" (1 549 m)   Wt 113 kg (249 lb)   LMP 08/28/2018 (Exact Date)   SpO2 100%   BMI 47 05 kg/m²        Physical Exam     Physical Exam   Constitutional: She is oriented to person, place, and time  She appears well-developed and well-nourished  HENT:   Head: Normocephalic  Right Ear: External ear normal    Left Ear: External ear normal    Posterior pharynx mildly erythemic   TTP over the sinuses    Neck: Normal range of motion  Neck supple  Cardiovascular: Normal rate, regular rhythm and normal heart sounds  Pulmonary/Chest: Effort normal and breath sounds normal    Neurological: She is alert and oriented to person, place, and time  Psychiatric: She has a normal mood and affect  Her behavior is normal    Nursing note and vitals reviewed

## 2018-12-01 NOTE — PATIENT INSTRUCTIONS
Rest and drink extra fluids  Start antibiotic  Tylenol as needed  Nasal saline flushes  Humidification  Warm or cold fluids can help with sore throat  Follow up with PCP if no improvement  Go to ER worsening

## 2018-12-03 ENCOUNTER — TELEPHONE (OUTPATIENT)
Dept: OBGYN CLINIC | Facility: CLINIC | Age: 28
End: 2018-12-03

## 2018-12-03 NOTE — TELEPHONE ENCOUNTER
Pt states she went to urgent care this weekend and was placed on Amoxicillin for sinus infection  Advised safe in pregnancy  Verbalized understanding  Routing to provider to update

## 2018-12-04 ENCOUNTER — TELEPHONE (OUTPATIENT)
Dept: PERINATAL CARE | Facility: CLINIC | Age: 28
End: 2018-12-04

## 2018-12-06 ENCOUNTER — ROUTINE PRENATAL (OUTPATIENT)
Dept: OBGYN CLINIC | Facility: CLINIC | Age: 28
End: 2018-12-06

## 2018-12-06 VITALS — DIASTOLIC BLOOD PRESSURE: 86 MMHG | WEIGHT: 257 LBS | SYSTOLIC BLOOD PRESSURE: 138 MMHG | BODY MASS INDEX: 48.56 KG/M2

## 2018-12-06 DIAGNOSIS — Z3A.14 14 WEEKS GESTATION OF PREGNANCY: Primary | ICD-10-CM

## 2018-12-06 DIAGNOSIS — K29.70 GASTRITIS WITHOUT BLEEDING, UNSPECIFIED CHRONICITY, UNSPECIFIED GASTRITIS TYPE: ICD-10-CM

## 2018-12-06 DIAGNOSIS — I10 HTN (HYPERTENSION), BENIGN: ICD-10-CM

## 2018-12-06 PROCEDURE — PNV: Performed by: OBSTETRICS & GYNECOLOGY

## 2018-12-06 RX ORDER — RANITIDINE 150 MG/1
CAPSULE ORAL
Qty: 60 CAPSULE | Refills: 0 | OUTPATIENT
Start: 2018-12-06

## 2018-12-06 NOTE — PROGRESS NOTES
C/o cramping  C/o right flank pain  C/o sore in inner thigh  Not able to provide urine sample at time of vitals

## 2018-12-06 NOTE — PROGRESS NOTES
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

## 2018-12-10 ENCOUNTER — TELEPHONE (OUTPATIENT)
Dept: OBGYN CLINIC | Facility: CLINIC | Age: 28
End: 2018-12-10

## 2018-12-10 NOTE — TELEPHONE ENCOUNTER
14wk OB called states she is having pain underneath breast and by umbilicus  Started on Saturday, pain is at times is constant and other time comes and goes  Denies any change in diet  Advised to monitor, warm bath and tylenol  If worsens to call the office  Verbalized understanding  Routing to provider for further advise

## 2018-12-10 NOTE — TELEPHONE ENCOUNTER
LM on voicemail  Hx of gall stone if pain persist or worsen she should follow up with PCP or Urgent care to make sure she doesn't have a gallbladder issue, this can sometimes worsen in pregnancy

## 2018-12-10 NOTE — TELEPHONE ENCOUNTER
I looked through her chart, looks like she has a history of gallstones  If her pain continues to worsen she should probably be evaluated by urgent care or her PCP to make sure she doesn't have a gallbladder related issue, sometimes that can worsen in pregnancy

## 2018-12-13 ENCOUNTER — TELEPHONE (OUTPATIENT)
Dept: OBGYN CLINIC | Facility: CLINIC | Age: 28
End: 2018-12-13

## 2018-12-13 NOTE — TELEPHONE ENCOUNTER
Pt calling states she would like a note to say she can work more than 40 hours  Per Dr Grant may write note   Note faxed to [phone] per patient request

## 2018-12-19 ENCOUNTER — OFFICE VISIT (OUTPATIENT)
Dept: OBGYN CLINIC | Facility: CLINIC | Age: 28
End: 2018-12-19
Payer: COMMERCIAL

## 2018-12-19 VITALS — SYSTOLIC BLOOD PRESSURE: 126 MMHG | WEIGHT: 257.6 LBS | DIASTOLIC BLOOD PRESSURE: 78 MMHG | BODY MASS INDEX: 48.67 KG/M2

## 2018-12-19 DIAGNOSIS — L72.0 EPIDERMOID CYST OF SKIN: ICD-10-CM

## 2018-12-19 DIAGNOSIS — Z34.02 ENCOUNTER FOR SUPERVISION OF NORMAL FIRST PREGNANCY IN SECOND TRIMESTER: ICD-10-CM

## 2018-12-19 DIAGNOSIS — Z3A.16 16 WEEKS GESTATION OF PREGNANCY: Primary | ICD-10-CM

## 2018-12-19 PROBLEM — O09.92 HIGH-RISK PREGNANCY IN SECOND TRIMESTER: Status: ACTIVE | Noted: 2018-12-12

## 2018-12-19 PROBLEM — Z87.19 HISTORY OF GALLSTONES: Status: ACTIVE | Noted: 2018-12-12

## 2018-12-19 PROCEDURE — 99213 OFFICE O/P EST LOW 20 MIN: CPT | Performed by: OBSTETRICS & GYNECOLOGY

## 2018-12-19 RX ORDER — PANTOPRAZOLE SODIUM 20 MG/1
20 TABLET, DELAYED RELEASE ORAL
COMMUNITY
End: 2019-01-07

## 2018-12-19 NOTE — PROGRESS NOTES
Darlene Lockett was seen today for pregnancy problem  Diagnoses and all orders for this visit:    16 weeks gestation of pregnancy    Epidermoid cyst of skin    Encounter for supervision of normal first pregnancy in second trimester         Plan  Warm compresses to armpit  Monitor for signs of infections  Discussed umbilical pain  Can be due to growing uterus  Yady Eduardo is a 29 y o  female here for a problem visit  Patient is complaining of small lump in armpit  Slightly tender  Sx's started 2 days ago  Patient Active Problem List   Diagnosis    HTN (hypertension), benign    Chest pain at rest    Obesity due to excess calories without serious comorbidity    Hx of leukocytosis    Irritable bowel syndrome with both constipation and diarrhea    Polycystic ovaries    Proteinuria    Epidermoid cyst of skin    ASCUS with positive high risk HPV cervical    Dysplasia of cervix, low grade (SYLVIE 1)    Heart burn    Chronic diarrhea    14 weeks gestation of pregnancy    High-risk pregnancy in second trimester    History of gallstones         Gynecologic History  Patient's last menstrual period was 2018 (exact date)  Contraception: none, patient is pregnant  Last Pap: 2018  Results were: ascus; HPV positive    Menstrual History:  OB History      Para Term  AB Living    1              SAB TAB Ectopic Multiple Live Births                      Patient's last menstrual period was 2018 (exact date)           Obstetric History  OB History    Para Term  AB Living   1             SAB TAB Ectopic Multiple Live Births                  # Outcome Date GA Lbr Augusto/2nd Weight Sex Delivery Anes PTL Lv   1 Current                     Past Medical History:   Diagnosis Date    Abnormal Pap smear of cervix     Gallstone     Genital herpes     HPV (human papilloma virus) infection     Hypertension     Polycystic ovary syndrome     Varicella Past Surgical History:   Procedure Laterality Date    COLPOSCOPY      NO PAST SURGERIES           Family History   Problem Relation Age of Onset    Hypertension Mother    [facility] Other Mother         High cholesterol    Brain cancer Mother     Lung cancer Mother     Liver cancer Mother     Pulmonary embolism Mother     Other Maternal Aunt         High cholesterol    No Known Problems Father     Hypertension Maternal Grandmother     Diabetes Maternal Grandmother     Cancer Maternal Grandmother         cervical       Social History     Social History    Marital status: Single     Spouse name: Ryder Valencia    Number of children: N/A    Years of education: [employer]     Occupational History    MA      Social History Main Topics    Smoking status: Never Smoker    Smokeless tobacco: Never Used      Comment: per allscripts - former smoker    Alcohol use Yes      Comment: socially prior to confirmed pregnancy    Drug use: No    Sexual activity: Yes     Partners: Male     Birth control/ protection: None     Other Topics Concern    Not on file     Social History Narrative    No narrative on file       Allergies  Erythromycin; Levonorgestrel-ethinyl estrad; Penicillins; and Medical tape    Medications    Current Outpatient Prescriptions:     acetaminophen (TYLENOL) 325 mg tablet, Take 650 mg by mouth every 6 (six) hours as needed for mild pain, Disp: , Rfl:     aspirin (ASPIRIN LOW DOSE) 81 MG tablet, , Disp: , Rfl:     calcium carbonate (TUMS) 500 mg chewable tablet, Chew 1 tablet daily, Disp: , Rfl:     pantoprazole (PROTONIX) 20 mg tablet, Take 1 tablet (20 mg total) by mouth daily for 90 days, Disp: 90 tablet, Rfl: 0    Prenatal Vit-Fe Fumarate-FA (PRENATAL VITAMIN PO), Take 1 tablet by mouth daily, Disp: , Rfl:     pantoprazole (PROTONIX) 20 mg tablet, Take 20 mg by mouth, Disp: , Rfl:       Review of Systems  Review of Systems   Gastrointestinal: Positive for abdominal pain  Genitourinary: Negative for vaginal bleeding  Objective     /78 (BP Location: Right arm, Patient Position: Sitting, Cuff Size: Large)   Wt 117 kg (257 lb 9 6 oz)   LMP 08/28/2018 (Exact Date)   BMI 48 67 kg/m²       Physical Exam   Constitutional: She appears well-developed and well-nourished  Pulmonary/Chest:   Small less than 1cm epidermal cyst in armpit with no signs of infection  No erythema, no drainage  Psychiatric: She has a normal mood and affect   Her behavior is normal

## 2018-12-22 ENCOUNTER — TRANSCRIBE ORDERS (OUTPATIENT)
Dept: LAB | Facility: HOSPITAL | Age: 28
End: 2018-12-22

## 2018-12-22 ENCOUNTER — APPOINTMENT (OUTPATIENT)
Dept: LAB | Facility: HOSPITAL | Age: 28
End: 2018-12-22
Attending: OBSTETRICS & GYNECOLOGY
Payer: COMMERCIAL

## 2018-12-22 DIAGNOSIS — Z34.01 ENCOUNTER FOR SUPERVISION OF NORMAL FIRST PREGNANCY IN FIRST TRIMESTER: ICD-10-CM

## 2018-12-22 DIAGNOSIS — Z34.01 ENCOUNTER FOR SUPERVISION OF NORMAL FIRST PREGNANCY IN FIRST TRIMESTER: Primary | ICD-10-CM

## 2018-12-22 LAB
ALBUMIN SERPL BCP-MCNC: 2.6 G/DL (ref 3.5–5)
ALP SERPL-CCNC: 81 U/L (ref 46–116)
ALT SERPL W P-5'-P-CCNC: 16 U/L (ref 12–78)
ANION GAP SERPL CALCULATED.3IONS-SCNC: 7 MMOL/L (ref 4–13)
AST SERPL W P-5'-P-CCNC: 8 U/L (ref 5–45)
BILIRUB SERPL-MCNC: 0.23 MG/DL (ref 0.2–1)
BUN SERPL-MCNC: 6 MG/DL (ref 5–25)
CALCIUM SERPL-MCNC: 8.4 MG/DL (ref 8.3–10.1)
CHLORIDE SERPL-SCNC: 105 MMOL/L (ref 100–108)
CO2 SERPL-SCNC: 25 MMOL/L (ref 21–32)
CREAT SERPL-MCNC: 0.54 MG/DL (ref 0.6–1.3)
GFR SERPL CREATININE-BSD FRML MDRD: 129 ML/MIN/1.73SQ M
GLUCOSE P FAST SERPL-MCNC: 73 MG/DL (ref 65–99)
POTASSIUM SERPL-SCNC: 3.5 MMOL/L (ref 3.5–5.3)
PROT SERPL-MCNC: 7.2 G/DL (ref 6.4–8.2)
SODIUM SERPL-SCNC: 137 MMOL/L (ref 136–145)

## 2018-12-22 PROCEDURE — 80053 COMPREHEN METABOLIC PANEL: CPT | Performed by: OBSTETRICS & GYNECOLOGY

## 2018-12-22 PROCEDURE — 36415 COLL VENOUS BLD VENIPUNCTURE: CPT | Performed by: OBSTETRICS & GYNECOLOGY

## 2018-12-23 LAB — SCAN RESULT: NORMAL

## 2018-12-31 ENCOUNTER — TELEPHONE (OUTPATIENT)
Dept: PERINATAL CARE | Facility: CLINIC | Age: 28
End: 2018-12-31

## 2019-01-02 ENCOUNTER — ROUTINE PRENATAL (OUTPATIENT)
Dept: OBGYN CLINIC | Facility: CLINIC | Age: 29
End: 2019-01-02

## 2019-01-02 ENCOUNTER — OFFICE VISIT (OUTPATIENT)
Dept: FAMILY MEDICINE CLINIC | Facility: CLINIC | Age: 29
End: 2019-01-02
Payer: COMMERCIAL

## 2019-01-02 VITALS
BODY MASS INDEX: 47.92 KG/M2 | HEIGHT: 62 IN | RESPIRATION RATE: 16 BRPM | DIASTOLIC BLOOD PRESSURE: 82 MMHG | SYSTOLIC BLOOD PRESSURE: 134 MMHG | TEMPERATURE: 98.3 F | HEART RATE: 84 BPM

## 2019-01-02 VITALS — WEIGHT: 262 LBS | DIASTOLIC BLOOD PRESSURE: 84 MMHG | BODY MASS INDEX: 49.5 KG/M2 | SYSTOLIC BLOOD PRESSURE: 138 MMHG

## 2019-01-02 DIAGNOSIS — Z3A.18 18 WEEKS GESTATION OF PREGNANCY: ICD-10-CM

## 2019-01-02 DIAGNOSIS — J02.9 PHARYNGITIS, UNSPECIFIED ETIOLOGY: Primary | ICD-10-CM

## 2019-01-02 LAB — S PYO AG THROAT QL: NEGATIVE

## 2019-01-02 PROCEDURE — 87880 STREP A ASSAY W/OPTIC: CPT | Performed by: FAMILY MEDICINE

## 2019-01-02 PROCEDURE — 99213 OFFICE O/P EST LOW 20 MIN: CPT | Performed by: FAMILY MEDICINE

## 2019-01-02 PROCEDURE — PNV: Performed by: OBSTETRICS & GYNECOLOGY

## 2019-01-02 RX ORDER — AMOXICILLIN 400 MG/5ML
400 POWDER, FOR SUSPENSION ORAL 3 TIMES DAILY
Qty: 150 ML | Refills: 0 | Status: ON HOLD | OUTPATIENT
Start: 2019-01-02 | End: 2019-01-07 | Stop reason: SINTOL

## 2019-01-02 NOTE — PROGRESS NOTES
Pt has a bad cold, and is going to her PCP today  Pt is having sleeping issues that she would like to discuss

## 2019-01-02 NOTE — PROGRESS NOTES
Rubi Ramirez is a 29y o  year old  at 18w1d for routine prenatal visit  BP: 138/84 TW  chtn- no medications for now  If >140/90 will need APFS beginning 32 weeks  Obesity- weekly NST @ 36 weeks  Level 2 scheduled for       Precautions reviewed  Sleep hygeine- discussed use of half a tablet unisom

## 2019-01-03 NOTE — PROGRESS NOTES
Assessment/Plan:    Rapid strep test is negative  Patient states she tolerates amoxicillin without problems and requests liquid medication  Patient was started on amoxicillin 400 mg per 5 mL 1 tsp t i d  for 10 days  Increase fluids, gargle with salt water and rest   Return to the office in 1 week or sooner gricel Dejesus Diagnoses and all orders for this visit:    Pharyngitis, unspecified etiology  Comments:  Rapid strep test is negative  Amoxicillin 400 mg per 5 mL 1 tsp t i d  for 10 days  Increase fluids and rest   Orders:  -     POCT rapid strepA  -     amoxicillin (AMOXIL) 400 MG/5ML suspension; Take 5 mL (400 mg total) by mouth 3 (three) times a day for 10 days          Subjective:      Patient ID: Genevieve Conley is a 29 y o  female  Patient complains of sore throat and painful swallowing  She denies fever  She admits to congestion and cough  No treatment by patient  Patient states she is 18 weeks pregnant and saw her OBGYN earlier today and was told she could take amoxicillin  Sore Throat    This is a new problem  The current episode started in the past 7 days  The problem has been gradually worsening  There has been no fever  Associated symptoms include congestion, coughing, headaches, a hoarse voice, a plugged ear sensation and trouble swallowing  Pertinent negatives include no diarrhea, ear pain, shortness of breath or vomiting  She has had no exposure to strep  She has tried cool liquids for the symptoms  The treatment provided no relief  Cough   Associated symptoms include headaches and a sore throat  Pertinent negatives include no ear pain or shortness of breath         The following portions of the patient's history were reviewed and updated as appropriate: allergies, current medications, past family history, past medical history, past social history, past surgical history and problem list     Review of Systems   HENT: Positive for congestion, hoarse voice, sore throat and trouble swallowing  Negative for ear pain  Respiratory: Positive for cough  Negative for shortness of breath  Gastrointestinal: Negative for diarrhea and vomiting  Neurological: Positive for headaches  Objective:      /86 (BP Location: Left arm, Patient Position: Sitting, Cuff Size: Standard)   Temp 98 3 °F (36 8 °C) (Tympanic)   Ht 5' 2" (1 575 m)   LMP 08/28/2018 (Exact Date)   BMI 47 92 kg/m²          Physical Exam   Constitutional: She is oriented to person, place, and time  She appears well-developed and well-nourished  No distress  HENT:   Head: Normocephalic  Right Ear: External ear normal    Left Ear: External ear normal    Positive turbinates swelling with mucoid drainage  Throat postnasal drainage and erythema  Mucous membranes moist    Eyes: Conjunctivae are normal  No scleral icterus  Neck: Neck supple  Cardiovascular: Normal rate and regular rhythm  Pulmonary/Chest: Effort normal and breath sounds normal    Abdominal: Soft  There is no tenderness  Musculoskeletal: She exhibits no edema  Lymphadenopathy:     She has no cervical adenopathy  Neurological: She is alert and oriented to person, place, and time  Skin: Skin is warm and dry  Psychiatric: She has a normal mood and affect

## 2019-01-07 ENCOUNTER — HOSPITAL ENCOUNTER (OUTPATIENT)
Facility: HOSPITAL | Age: 29
Discharge: HOME | End: 2019-01-07
Attending: EMERGENCY MEDICINE | Admitting: OBSTETRICS & GYNECOLOGY
Payer: COMMERCIAL

## 2019-01-07 VITALS
DIASTOLIC BLOOD PRESSURE: 76 MMHG | WEIGHT: 252 LBS | TEMPERATURE: 98.9 F | HEART RATE: 94 BPM | OXYGEN SATURATION: 96 % | BODY MASS INDEX: 46.09 KG/M2 | RESPIRATION RATE: 22 BRPM | SYSTOLIC BLOOD PRESSURE: 130 MMHG

## 2019-01-07 DIAGNOSIS — N89.8 VAGINAL ITCHING: ICD-10-CM

## 2019-01-07 DIAGNOSIS — N93.9 VAGINAL SPOTTING: ICD-10-CM

## 2019-01-07 DIAGNOSIS — R68.89 FLU-LIKE SYMPTOMS: Primary | ICD-10-CM

## 2019-01-07 DIAGNOSIS — B34.9 VIRAL SYNDROME: Primary | ICD-10-CM

## 2019-01-07 DIAGNOSIS — B37.3 YEAST INFECTION OF THE VAGINA: ICD-10-CM

## 2019-01-07 PROBLEM — B37.31 YEAST INFECTION OF THE VAGINA: Status: ACTIVE | Noted: 2019-01-07

## 2019-01-07 LAB
ALBUMIN SERPL BCP-MCNC: 2.4 G/DL (ref 3.5–5)
ALP SERPL-CCNC: 82 U/L (ref 46–116)
ALT SERPL W P-5'-P-CCNC: 13 U/L (ref 12–78)
ANION GAP SERPL CALCULATED.3IONS-SCNC: 6 MMOL/L (ref 4–13)
AST SERPL W P-5'-P-CCNC: 10 U/L (ref 5–45)
BASOPHILS # BLD AUTO: 0.04 THOUSANDS/ΜL (ref 0–0.1)
BASOPHILS NFR BLD AUTO: 0 % (ref 0–1)
BILIRUB SERPL-MCNC: 0.14 MG/DL (ref 0.2–1)
BUN SERPL-MCNC: 5 MG/DL (ref 5–25)
CALCIUM SERPL-MCNC: 8.3 MG/DL (ref 8.3–10.1)
CHLORIDE SERPL-SCNC: 105 MMOL/L (ref 100–108)
CO2 SERPL-SCNC: 25 MMOL/L (ref 21–32)
CREAT SERPL-MCNC: 0.48 MG/DL (ref 0.6–1.3)
EOSINOPHIL # BLD AUTO: 0.17 THOUSAND/ΜL (ref 0–0.61)
EOSINOPHIL NFR BLD AUTO: 2 % (ref 0–6)
ERYTHROCYTE [DISTWIDTH] IN BLOOD BY AUTOMATED COUNT: 13.4 % (ref 11.6–15.1)
GFR SERPL CREATININE-BSD FRML MDRD: 134 ML/MIN/1.73SQ M
GLUCOSE SERPL-MCNC: 80 MG/DL (ref 65–140)
HCT VFR BLD AUTO: 38.7 % (ref 34.8–46.1)
HGB BLD-MCNC: 12.4 G/DL (ref 11.5–15.4)
IMM GRANULOCYTES # BLD AUTO: 0.08 THOUSAND/UL (ref 0–0.2)
IMM GRANULOCYTES NFR BLD AUTO: 1 % (ref 0–2)
LYMPHOCYTES # BLD AUTO: 2.06 THOUSANDS/ΜL (ref 0.6–4.47)
LYMPHOCYTES NFR BLD AUTO: 19 % (ref 14–44)
MCH RBC QN AUTO: 27.6 PG (ref 26.8–34.3)
MCHC RBC AUTO-ENTMCNC: 32 G/DL (ref 31.4–37.4)
MCV RBC AUTO: 86 FL (ref 82–98)
MONOCYTES # BLD AUTO: 0.8 THOUSAND/ΜL (ref 0.17–1.22)
MONOCYTES NFR BLD AUTO: 7 % (ref 4–12)
NEUTROPHILS # BLD AUTO: 7.84 THOUSANDS/ΜL (ref 1.85–7.62)
NEUTS SEG NFR BLD AUTO: 71 % (ref 43–75)
NRBC BLD AUTO-RTO: 0 /100 WBCS
PLATELET # BLD AUTO: 298 THOUSANDS/UL (ref 149–390)
PMV BLD AUTO: 10.3 FL (ref 8.9–12.7)
POTASSIUM SERPL-SCNC: 3.4 MMOL/L (ref 3.5–5.3)
PROT SERPL-MCNC: 7.1 G/DL (ref 6.4–8.2)
RBC # BLD AUTO: 4.5 MILLION/UL (ref 3.81–5.12)
SODIUM SERPL-SCNC: 136 MMOL/L (ref 136–145)
WBC # BLD AUTO: 10.99 THOUSAND/UL (ref 4.31–10.16)

## 2019-01-07 PROCEDURE — 96374 THER/PROPH/DIAG INJ IV PUSH: CPT

## 2019-01-07 PROCEDURE — 85025 COMPLETE CBC W/AUTO DIFF WBC: CPT | Performed by: EMERGENCY MEDICINE

## 2019-01-07 PROCEDURE — 80053 COMPREHEN METABOLIC PANEL: CPT | Performed by: EMERGENCY MEDICINE

## 2019-01-07 PROCEDURE — 76817 TRANSVAGINAL US OBSTETRIC: CPT | Performed by: OBSTETRICS & GYNECOLOGY

## 2019-01-07 PROCEDURE — 96360 HYDRATION IV INFUSION INIT: CPT

## 2019-01-07 PROCEDURE — 36415 COLL VENOUS BLD VENIPUNCTURE: CPT | Performed by: EMERGENCY MEDICINE

## 2019-01-07 PROCEDURE — 99214 OFFICE O/P EST MOD 30 MIN: CPT

## 2019-01-07 PROCEDURE — 96361 HYDRATE IV INFUSION ADD-ON: CPT

## 2019-01-07 PROCEDURE — 99284 EMERGENCY DEPT VISIT MOD MDM: CPT

## 2019-01-07 RX ORDER — FLUCONAZOLE 150 MG/1
150 TABLET ORAL ONCE
Qty: 1 TABLET | Refills: 0 | Status: SHIPPED | OUTPATIENT
Start: 2019-01-07 | End: 2019-01-07

## 2019-01-07 RX ORDER — ONDANSETRON 2 MG/ML
4 INJECTION INTRAMUSCULAR; INTRAVENOUS ONCE
Status: COMPLETED | OUTPATIENT
Start: 2019-01-07 | End: 2019-01-07

## 2019-01-07 RX ADMIN — SODIUM CHLORIDE 1000 ML: 0.9 INJECTION, SOLUTION INTRAVENOUS at 09:26

## 2019-01-07 RX ADMIN — ONDANSETRON 4 MG: 2 INJECTION INTRAMUSCULAR; INTRAVENOUS at 09:23

## 2019-01-07 NOTE — ED NOTES
Resident at bedside  Attempted fetal heart tones with doppler, unable to find d/t pt size    Resident aware     Román Salomon RN  55/49/41 [id number]

## 2019-01-07 NOTE — ED PROVIDER NOTES
History  Chief Complaint   Patient presents with    Cough     Pt is 19 weeks pregnant , sent over by [facility] for further evaluation  Cough and body aches for about 5 days and is on antibiotics  "not feeling any better"  Pt also c/o vaginal itching and abdominal cramping  Pt sent over for flu like symptoms  29year-old G1 at 19 weeks presents for evaluation of multiple complaints  Patient reports a one-week history of rhinorrhea, sore throat, and cough  She reports nonbloody diarrhea over the weekend  Patient was evaluated by her PCP 1 week ago and put on amoxicillin  She called her OB today, Dr Dax Ortiz, and reported the symptoms along with vaginal itching and spotting  She was told to be evaluated in the ED before going up to Labor and delivery  No fevers, vomiting, or increased abdominal pain  Patient is Rh positive  On arrival, patient is afebrile and mildly tachycardic with otherwise normal vital signs  Physical exam shows a benign abdomen and dry cough without other acute findings  Patient is also complaining of generalized itching worse in the lower extremities  Will perform basic labs to evaluate for electrolyte abnormalities and evidence of cholestasis  Will treat symptomatically with IV fluids, Zofran, and reassess  Likely viral syndrome in etiology  Prior to Admission Medications   Prescriptions Last Dose Informant Patient Reported? Taking?    Prenatal Vit-Fe Fumarate-FA (PRENATAL VITAMIN PO) 1/6/2019 at Unknown time Self Yes Yes   Sig: Take 1 tablet by mouth daily   amoxicillin (AMOXIL) 400 MG/5ML suspension 1/6/2019 at Unknown time  No Yes   Sig: Take 5 mL (400 mg total) by mouth 3 (three) times a day for 10 days   aspirin (ASPIRIN LOW DOSE) 81 MG tablet 1/6/2019 at Unknown time  Yes Yes   calcium carbonate (TUMS) 500 mg chewable tablet 1/6/2019 at Unknown time  Yes Yes   Sig: Chew 1 tablet daily   pantoprazole (PROTONIX) 20 mg tablet 1/6/2019 at Unknown time  No Yes   Sig: Take 1 tablet (20 mg total) by mouth daily for 90 days      Facility-Administered Medications: None       Past Medical History:   Diagnosis Date    Abnormal Pap smear of cervix     Anxiety     Eating disorder     Gallstone     Genital herpes     HPV (human papilloma virus) infection     Hypertension     Polycystic ovary syndrome     Varicella        Past Surgical History:   Procedure Laterality Date    COLPOSCOPY      NO PAST SURGERIES         Family History   Problem Relation Age of Onset    Hypertension Mother     Other Mother         High cholesterol    Brain cancer Mother     Lung cancer Mother     Liver cancer Mother     Other Maternal Aunt         High cholesterol    No Known Problems Father     Hypertension Maternal Grandmother     Diabetes Maternal Grandmother     Cancer Maternal Grandmother         cervical     I have reviewed and agree with the history as documented  Social History   Substance Use Topics    Smoking status: Never Smoker    Smokeless tobacco: Never Used      Comment: per allscripts - former smoker    Alcohol use No        Review of Systems   Constitutional: Positive for fatigue  Negative for chills and fever  HENT: Positive for congestion, rhinorrhea and sore throat  Eyes: Negative for photophobia and visual disturbance  Respiratory: Positive for cough  Negative for shortness of breath  Cardiovascular: Negative for chest pain and leg swelling  Gastrointestinal: Positive for diarrhea and nausea  Negative for abdominal pain, blood in stool and vomiting  Genitourinary: Positive for vaginal bleeding  Negative for dysuria, frequency and hematuria  Musculoskeletal: Negative for back pain and neck pain  Skin: Negative for rash and wound  Neurological: Negative for light-headedness and headaches         Physical Exam  ED Triage Vitals [01/07/19 0813]   Temperature Pulse Respirations Blood Pressure SpO2   99 6 °F (37 6 °C) (!) 111 18 145/67 97 %      Temp Source Heart Rate Source Patient Position - Orthostatic VS BP Location FiO2 (%)   Oral Monitor Sitting Left arm --      Pain Score       5           Orthostatic Vital Signs  Vitals:    01/07/19 0813 01/07/19 0930 01/07/19 1000   BP: 145/67 141/83 138/78   Pulse: (!) 111 91 86   Patient Position - Orthostatic VS: Sitting         Physical Exam   Constitutional: She is oriented to person, place, and time  She appears well-developed and well-nourished  No distress  HENT:   Head: Normocephalic and atraumatic  Mouth/Throat: Oropharynx is clear and moist  No oropharyngeal exudate  Rhinorrhea   Eyes: Pupils are equal, round, and reactive to light  Conjunctivae are normal  No scleral icterus  Neck: Neck supple  No JVD present  Cardiovascular: Normal rate, regular rhythm and normal heart sounds  Exam reveals no gallop and no friction rub  No murmur heard  Pulmonary/Chest: Effort normal and breath sounds normal  No respiratory distress  She has no wheezes  She has no rales  Cough   Abdominal: Soft  She exhibits no distension  There is no tenderness  There is no rebound and no guarding  Musculoskeletal: She exhibits no edema or tenderness  Neurological: She is alert and oriented to person, place, and time  Skin: Skin is warm and dry  Capillary refill takes less than 2 seconds  She is not diaphoretic  Psychiatric: She has a normal mood and affect  Her behavior is normal  Thought content normal    Vitals reviewed        ED Medications  Medications   sodium chloride 0 9 % bolus 1,000 mL (1,000 mL Intravenous New Bag 1/7/19 0926)   ondansetron (ZOFRAN) injection 4 mg (4 mg Intravenous Given 1/7/19 0923)       Diagnostic Studies  Results Reviewed     Procedure Component Value Units Date/Time    Comprehensive metabolic panel [[id number]]  (Abnormal) Collected:  01/07/19 0926    Lab Status:  Final result Specimen:  Blood from Arm, Right Updated:  01/07/19 1002     Sodium 136 mmol/L      Potassium 3 4 (L) mmol/L Chloride 105 mmol/L      CO2 25 mmol/L      ANION GAP 6 mmol/L      BUN 5 mg/dL      Creatinine 0 48 (L) mg/dL      Glucose 80 mg/dL      Calcium 8 3 mg/dL      AST 10 U/L      ALT 13 U/L      Alkaline Phosphatase 82 U/L      Total Protein 7 1 g/dL      Albumin 2 4 (L) g/dL      Total Bilirubin 0 14 (L) mg/dL      eGFR 134 ml/min/1 73sq m     Narrative:         National Kidney Disease Education Program recommendations are as follows:  GFR calculation is accurate only with a steady state creatinine  Chronic Kidney disease less than 60 ml/min/1 73 sq  meters  Kidney failure less than 15 ml/min/1 73 sq  meters  CBC and differential [[id number]]  (Abnormal) Collected:  01/07/19 0926    Lab Status:  Final result Specimen:  Blood from Arm, Right Updated:  01/07/19 0940     WBC 10 99 (H) Thousand/uL      RBC 4 50 Million/uL      Hemoglobin 12 4 g/dL      Hematocrit 38 7 %      MCV 86 fL      MCH 27 6 pg      MCHC 32 0 g/dL      RDW 13 4 %      MPV 10 3 fL      Platelets 259 Thousands/uL      nRBC 0 /100 WBCs      Neutrophils Relative 71 %      Immat GRANS % 1 %      Lymphocytes Relative 19 %      Monocytes Relative 7 %      Eosinophils Relative 2 %      Basophils Relative 0 %      Neutrophils Absolute 7 84 (H) Thousands/µL      Immature Grans Absolute 0 08 Thousand/uL      Lymphocytes Absolute 2 06 Thousands/µL      Monocytes Absolute 0 80 Thousand/µL      Eosinophils Absolute 0 17 Thousand/µL      Basophils Absolute 0 04 Thousands/µL                  No orders to display         Procedures  Procedures      Phone Consults  ED Phone Contact    ED Course  ED Course as of Jan 07 1058 Mon Jan 07, 2019   4625 Bedside ultrasound shows fetal movement with heart rate 153  MDM  Number of Diagnoses or Management Options  Vaginal itching:   Vaginal spotting:   Viral syndrome:   Diagnosis management comments: Basic labs unremarkable  No significant electrolyte abnormality or anemia    No evidence of cholestasis  Patient was given IV fluids and Zofran  Likely viral syndrome in etiology  She was counseled regarding the utility of chest x-ray and agrees with foregoing at this time  Patient Rh positive  Fetal heart tones 153  Patient was transferred to Labor and delivery for further management  She was counseled to discontinue antibiotics as this may be the cause of her diarrhea and possible yeast infection  CritCare Time    Disposition  Final diagnoses:   Viral syndrome   Vaginal itching   Vaginal spotting     Time reflects when diagnosis was documented in both MDM as applicable and the Disposition within this note     Time User Action Codes Description Comment    1/7/2019 10:08 AM Robby Kong Add [B34 9] Viral syndrome     1/7/2019 10:11 AM Robby Kong Add [N89 8] Vaginal itching     1/7/2019 10:11 AM Robby Kong Add [N93 9] Vaginal spotting       ED Disposition     ED Disposition Condition Comment    Send to L&D After Provider Eval        Follow-up Information    None         Current Discharge Medication List      CONTINUE these medications which have NOT CHANGED    Details   amoxicillin (AMOXIL) 400 MG/5ML suspension Take 5 mL (400 mg total) by mouth 3 (three) times a day for 10 days  Qty: 150 mL, Refills: 0    Associated Diagnoses: Pharyngitis, unspecified etiology      aspirin (ASPIRIN LOW DOSE) 81 MG tablet       calcium carbonate (TUMS) 500 mg chewable tablet Chew 1 tablet daily      pantoprazole (PROTONIX) 20 mg tablet Take 1 tablet (20 mg total) by mouth daily for 90 days  Qty: 90 tablet, Refills: 0    Associated Diagnoses: Heart burn      Prenatal Vit-Fe Fumarate-FA (PRENATAL VITAMIN PO) Take 1 tablet by mouth daily           No discharge procedures on file  ED Provider  Attending physically available and evaluated Elvira Salomon I managed the patient along with the ED Attending      Electronically Signed by         Gopal Michelle MD  01/07/19 4054

## 2019-01-07 NOTE — ED NOTES
L & D callde and a/w patient, IV to stay in, NSS infusing     Jake Brian, RN  71/76/88 [address]  [facility], [address] [place] [address]  34/04/55 0919

## 2019-01-07 NOTE — PROGRESS NOTES
L&D Triage Note - OB/GYN  Samir Rey 29 y o  female MRN: [id number]  Unit/Bed#:  Triage 4 Encounter: [id number]      Assessment:  29 y o   at 18w6d who presents with vaginal itching and discharge    Plan:  1  Yeast infection  - Diagnosed via Wet/KOH mount  - No evidence of bacterial vaginosis or vaginal bleeding  - Diflucan prescribed    2  Cramping  - No evidence of  labor  - Cervical length wnl      3  Vaginal bleeding  - No evidence of vaginal bleeding, no evidence of vulvar bleeding  - Most likely due to patient reports of itching    Discussed with Dr Mansi Tomlinson  ______________________________________________________________________      Chief Compliant: vaginal itching    TIME: 1150  Subjective:  29 y o   at 18w6d who presents after a visit to the emergency department where she was diagnosed with a viral illness  Patient reports that she has been sick intermittently since becoming pregnant  She reports occasional cramping that is sometimes worse than menstrual cramps  She reports that for the last few days she has had ants in her pants    She reports vaginal discharge that is occasionally colored but mostly clear and itching  She reports that she began taking an antibiotic from her PCP due to concern for her illness  However ,today in the ED she was advised that it is most likely viral and therefore told to stop taking the antibiotic at this time  Patient has a history of yeast infections  Patient reports that she has also had some vaginal bleeding  She endorses that the bleeding is most likely due to her scratching        Objective:  Vitals:    19 1106   BP: 130/76   Pulse: 94   Resp: 22   Temp: 98 9 °F (37 2 °C)   SpO2:        SSE: Erythematous, smooth vaginal mucosa without evidence of bleeding or excoriations; Cervix not visualized in its entirety, white discharge in vaginal vault, no odor, erythematous vulva  FHT:  170 on Doppler; 150s reported in the ED          Nicol Hernandez MD 1/7/2019 11:47 AM

## 2019-01-07 NOTE — DISCHARGE INSTRUCTIONS
Vulvovaginal Candidiasis   WHAT YOU NEED TO KNOW:   What is vulvovaginal candidiasis? Vulvovaginal candidiasis, or yeast infection, is a common vaginal infection  Vulvovaginal candidiasis is caused by a fungus, or yeast-like germ  Fungi are normally found in your vagina  When there are too many fungi, it can cause an infection  What increases my risk for vulvovaginal candidiasis? · Pregnancy    · Medical conditions that suppress your immune system, such as diabetes or HIV and AIDS    · Medicines, such as antibiotics, birth control pills, or steroid medicine    · Contraceptive devices, such as diaphragms, sponges, and intrauterine devices  What are the signs and symptoms of vulvovaginal candidiasis? · Thick, white, cheese-like discharge from your vagina    · Itching, swelling, or redness in your vagina    · Burning when you urinate  How is vulvovaginal candidiasis diagnosed? Your healthcare provider will ask about your medical history and examine you  A sample of your vaginal discharge may show what germ is causing your infection  How is vulvovaginal candidiasis treated? Medicines help treat the fungal infection and decrease inflammation  The medicine may be a pill, topical cream, or vaginal suppository  With treatment, the infection is usually gone within a week: How can I manage my symptoms? · Wear cotton underwear  · Keep the vaginal area clean and dry  · Wipe from front to back after you urinate or have a bowel movement  · Do not have sex until your symptoms are gone  · Do not douche  · Do not use strong perfumes or soaps  · Do not use feminine hygiene sprays, powders, or bubble bath  How can I prevent another infection? · Take showers instead of baths  · Eat yogurt  · Limit the amount of alcohol you drink  · Limit your time in hot tubs  · Control your blood sugar if you are diabetic  When should I seek immediate care? · You have fever and chills      · You are bleeding from your vagina and it is not your monthly period  · You develop abdominal or pelvic pain  When should I contact my healthcare provider? · Your signs and symptoms get worse, even after treatment  · You have questions or concerns about your condition or care  CARE AGREEMENT:   You have the right to help plan your care  Learn about your health condition and how it may be treated  Discuss treatment options with your caregivers to decide what care you want to receive  You always have the right to refuse treatment  The above information is an  only  It is not intended as medical advice for individual conditions or treatments  Talk to your doctor, nurse or pharmacist before following any medical regimen to see if it is safe and effective for you  © 2017 [address] Information is for End User's use only and may not be sold, redistributed or otherwise used for commercial purposes  All illustrations and images included in CareNotes® are the copyrighted property of [employer]  or [employer]  Viral Syndrome   WHAT YOU NEED TO KNOW:   Viral syndrome is a term used for a viral infection that has no clear cause  Viruses are spread easily from person to person through the air and on shared items  DISCHARGE INSTRUCTIONS:   Call 911 for the following:   · You have a seizure  · You cannot be woken  · You have chest pain or trouble breathing  Return to the emergency department if:   · You have a stiff neck, a bad headache, and sensitivity to light  · You feel weak, dizzy, or confused  · You stop urinating or urinate a lot less than normal      · You cough up blood or thick, yellow or green, mucus  · You have severe abdominal pain or your abdomen is larger than usual   Contact your healthcare provider if:   · Your symptoms do not get better with treatment, or get worse, after 3 days  · You have a rash or ear pain       · You have burning when you urinate  · You have questions or concerns about your condition or care  Medicines: You may  need any of the following:  · Acetaminophen  decreases pain and fever  It is available without a doctor's order  Ask how much medicine to take and how often to take it  Follow directions  Acetaminophen can cause liver damage if not taken correctly  · NSAIDs , such as ibuprofen, help decrease swelling, pain, and fever  NSAIDs can cause stomach bleeding or kidney problems in certain people  If you take blood thinner medicine, always ask your healthcare provider if NSAIDs are safe for you  Always read the medicine label and follow directions  · Cold medicine  helps decrease swelling, control a cough, and relieve chest or nasal congestion  · Saline nasal spray  helps decrease nasal congestion  · Take your medicine as directed  Contact your healthcare provider if you think your medicine is not helping or if you have side effects  Tell him of her if you are allergic to any medicine  Keep a list of the medicines, vitamins, and herbs you take  Include the amounts, and when and why you take them  Bring the list or the pill bottles to follow-up visits  Carry your medicine list with you in case of an emergency  Manage your symptoms:   · Drink liquids as directed  to prevent dehydration  Ask how much liquid to drink each day and which liquids are best for you  Ask if you should drink an oral rehydration solution (ORS)  An ORS has the right amounts of water, salts, and sugar you need to replace body fluids  This may help prevent dehydration caused by vomiting or diarrhea  Do not drink liquids with caffeine  Drinks with caffeine can make dehydration worse  · Get plenty of rest  to help your body heal  Take naps throughout the day  Ask your healthcare provider when you can return to work and your normal activities       · Use a cool mist humidifier  to help you breathe easier if you have nasal or chest congestion  Ask your healthcare provider how to use a cool mist humidifier  · Eat honey or use cough drops  to help decrease throat discomfort  Ask your healthcare provider how much honey you should eat each day  Cough drops are available without a doctor's order  Follow directions for taking cough drops  · Do not smoke and stay away from others who smoke  Nicotine and other chemicals in cigarettes and cigars can cause lung damage  Smoking can also delay healing  Ask your healthcare provider for information if you currently smoke and need help to quit  E-cigarettes or smokeless tobacco still contain nicotine  Talk to your healthcare provider before you use these products  · Wash your hands frequently  to prevent the spread of germs to others  Use soap and water  Use gel hand  when soap and water are not available  Wash your hands after you use the bathroom, cough, or sneeze  Wash your hands before you prepare or eat food  Follow up with your healthcare provider as directed:  Write down your questions so you remember to ask them during your visits  © 2017 [facility] [facility] Information is for End User's use only and may not be sold, redistributed or otherwise used for commercial purposes  All illustrations and images included in CareNotes® are the copyrighted property of [employer]  or [employer]  The above information is an  only  It is not intended as medical advice for individual conditions or treatments  Talk to your doctor, nurse or pharmacist before following any medical regimen to see if it is safe and effective for you

## 2019-01-07 NOTE — PROCEDURES
Nancy Sepulveda, a  at 18w6d with an LALO of 2019, by Last Menstrual Period, was seen at [facility] for the following procedure(s): $Procedure Type: US - Transvaginal]                   Ultrasound Other  Cervical Length: 4 02  Funnel: No  Debris: No  Placenta Previa: No  Vasa Previa: No

## 2019-01-09 ENCOUNTER — TELEPHONE (OUTPATIENT)
Dept: OBGYN CLINIC | Facility: CLINIC | Age: 29
End: 2019-01-09

## 2019-01-09 DIAGNOSIS — B37.3 CANDIDAL VULVOVAGINITIS: Primary | ICD-10-CM

## 2019-01-09 RX ORDER — NYSTATIN AND TRIAMCINOLONE ACETONIDE 100000; 1 [USP'U]/G; MG/G
OINTMENT TOPICAL 2 TIMES DAILY
Qty: 30 G | Refills: 0 | Status: ON HOLD | OUTPATIENT
Start: 2019-01-09 | End: 2019-02-14 | Stop reason: HOSPADM

## 2019-01-09 NOTE — TELEPHONE ENCOUNTER
Pt was seen in the ER on Monday for a yeast infection  She was given Diflucan but states that the itching is very bad  She can't sit and its sore  Per Dr Lauryn Acosta, we will send in an ointment to use externally for the itching and she can take sitz bath  Pt understands     BS

## 2019-01-10 ENCOUNTER — TELEPHONE (OUTPATIENT)
Dept: OBGYN CLINIC | Facility: CLINIC | Age: 29
End: 2019-01-10

## 2019-01-10 NOTE — TELEPHONE ENCOUNTER
19wk OB  left message on nurse line states she was called in Mycolog II for vaginal itching, unsure how long to take  RC to patient advised to take as long as she has vaginal itching, once itching stops she can stop using the cream, verbalized understanding  Routing to provider for further advise

## 2019-01-14 ENCOUNTER — ROUTINE PRENATAL (OUTPATIENT)
Dept: OBGYN CLINIC | Facility: CLINIC | Age: 29
End: 2019-01-14

## 2019-01-14 VITALS — DIASTOLIC BLOOD PRESSURE: 76 MMHG | SYSTOLIC BLOOD PRESSURE: 124 MMHG | WEIGHT: 265.4 LBS | BODY MASS INDEX: 48.54 KG/M2

## 2019-01-14 DIAGNOSIS — O09.92 HIGH-RISK PREGNANCY IN SECOND TRIMESTER: Primary | ICD-10-CM

## 2019-01-14 DIAGNOSIS — B37.3 YEAST INFECTION OF THE VAGINA: ICD-10-CM

## 2019-01-14 DIAGNOSIS — Z3A.19 19 WEEKS GESTATION OF PREGNANCY: ICD-10-CM

## 2019-01-14 PROCEDURE — PNV: Performed by: OBSTETRICS & GYNECOLOGY

## 2019-01-14 NOTE — PROGRESS NOTES
Pt states she cannot sleep, does not want to take unisom or anything otc  C/o vaginal itching and irritation  C/o diarrhea every time she eats

## 2019-01-14 NOTE — PROGRESS NOTES
29 y o    female at 23   6 wga EGA for PNV  BP : 124/76   TW  Upper URI - improving  Vaginal/perineal yeast -- improving - continue Mycolog II  Diarrhea - recommend seeing GI        - recommend recording everything she eats   Reviewed PTL precautions  F/u 2 weeks

## 2019-01-18 NOTE — ED ATTENDING ATTESTATION
Anna Rodriguez MD, saw and evaluated the patient  I have discussed the patient with the resident/non-physician practitioner and agree with the resident's/non-physician practitioner's findings, Plan of Care, and MDM as documented in the resident's/non-physician practitioner's note, except where noted  All available labs and Radiology studies were reviewed  At this point I agree with the current assessment done in the Emergency Department  I have conducted an independent evaluation of this patient a history and physical is as follows:    Patient presents for evaluation of 1 week rhinorrhea, sore throat, and cough  Patient was evaluated by her PCP and started on amoxicillin  Now, patient also reports having some diarrhea  Patient called her OB who recommended that she be evaluated in the emergency department before being evaluated in Labor and delivery  Patient denies any abdominal cramping  No additional complaints  Exam: AAOx3, NAD, tachycardic, CTA, S/NT/ND  A/P:  Viral syndrome  Will check labs, give IV fluids, and treat with Zofran  Will recommend stopping amoxicillin as symptoms are viral and this is likely adding to the diarrhea      Critical Care Time  CritCare Time    Procedures

## 2019-01-22 ENCOUNTER — TELEPHONE (OUTPATIENT)
Dept: OBGYN CLINIC | Facility: CLINIC | Age: 29
End: 2019-01-22

## 2019-01-22 NOTE — TELEPHONE ENCOUNTER
Patient called stating she is spotting , having lower back pain and cramping and feeling very tired  Patient can be reached at work [phone]

## 2019-01-22 NOTE — TELEPHONE ENCOUNTER
Patient called to say is going home from work for the night still having spotting 21 wks can be reached at  [phone]

## 2019-01-23 ENCOUNTER — HOSPITAL ENCOUNTER (OUTPATIENT)
Facility: HOSPITAL | Age: 29
Discharge: HOME | End: 2019-01-23
Attending: OBSTETRICS & GYNECOLOGY | Admitting: OBSTETRICS & GYNECOLOGY
Payer: COMMERCIAL

## 2019-01-23 VITALS
SYSTOLIC BLOOD PRESSURE: 108 MMHG | HEART RATE: 114 BPM | RESPIRATION RATE: 16 BRPM | TEMPERATURE: 99.4 F | DIASTOLIC BLOOD PRESSURE: 56 MMHG

## 2019-01-23 PROBLEM — Z3A.21 21 WEEKS GESTATION OF PREGNANCY: Status: ACTIVE | Noted: 2019-01-23

## 2019-01-23 PROCEDURE — 99213 OFFICE O/P EST LOW 20 MIN: CPT

## 2019-01-23 PROCEDURE — 76817 TRANSVAGINAL US OBSTETRIC: CPT | Performed by: OBSTETRICS & GYNECOLOGY

## 2019-01-23 NOTE — LETTER
[facility]  [address] [facility] [address]  Dept: [phone]    January 23, 2019     Patient: Angie Mayen   YOB: 1990   Date of Visit: 1/7/2019       To Whom it May Concern:    Angie Mayen is under my professional care  She was seen in the hospital on 29/17/32 for complications related to pregnancy  Please excuse her absence  If you have any questions or concerns, please don't hesitate to call           Sincerely,          Hussein Smith MD

## 2019-01-23 NOTE — TELEPHONE ENCOUNTER
Attempted to call patient - LM on voicemail for patient to call the office  Per Dr Sabrina Lancaster patient should report to L&D for decreased fetal movement

## 2019-01-23 NOTE — PROGRESS NOTES
L&D Triage Note - OB/GYN  Oradriana Jackeline 29 y o  female MRN: [id number]  Unit/Bed#:  Triage 2- Encounter: [id number]      Assessment:  29 y o   at 21w1d who presents with decreased fetal movement  Plan:  1  Decreased fetal movement   - Significant fetal movement seen on ultrasound   - KOH/NSS: negative     2  Vaginal candidiasis albicans: patient was diagnosed on 19 and is s/p Diflucan treatment      Management discussed with Dr Evie Wang    ______________________________________________________________________      Chief Compliant: decreased fetal movement     TIME: 14:20  Subjective: Gini Phillips is a 29 y o   at 21w1d who presents with decreased fetal movement  The patient has not felt any fetal movement to date  She states that she was told to come in for evaluation by her OB  In addition, she states that she has been having intermittent cramping throughout her pregnancy as well as vaginal spotting  She describes the spotting as bright orange, pink tinged when wiping on the toilet paper       Pregnancy complications: none to date     Leakage of fluid: none  Fetal movement:none  Vaginal bleeding: yes, spotting  Contractions: intermitting cramping     Objective:  Vitals:    19 1418   BP: 108/56   Pulse: (!) 114   Temp: 99 4 °F (37 4 °C)       Physical Exam   FHT:  140s  TVUS: 37-40mm  KOH/NSS: negative for hyphae, Clue cells and trichomonads           Pedro Woods MD 2019 2:44 PM

## 2019-01-23 NOTE — TELEPHONE ENCOUNTER
21w1d OB states she had some spotting yesterday which has subsided today  She is also c/o lower back pain, cramping on and off  States she has never felt the baby move during this pregnancy  Advised 2 large glasses of water to see if cramping subside  Call the office if symptoms worsen  Routing to provider for further advise

## 2019-01-23 NOTE — PROCEDURES
Phillip Larose, a  at 21w1d with an LALO of 2019, by Last Menstrual Period, was seen at [facility] for the following procedure(s): $Procedure Type: US - Transvaginal]                   Ultrasound Other  Cervical Length: 37  Funnel: No  Debris: No  Placenta Location: Anterior

## 2019-01-24 ENCOUNTER — ROUTINE PRENATAL (OUTPATIENT)
Dept: PERINATAL CARE | Facility: CLINIC | Age: 29
End: 2019-01-24
Payer: COMMERCIAL

## 2019-01-24 VITALS
BODY MASS INDEX: 51.83 KG/M2 | HEART RATE: 98 BPM | SYSTOLIC BLOOD PRESSURE: 131 MMHG | WEIGHT: 264 LBS | RESPIRATION RATE: 18 BRPM | DIASTOLIC BLOOD PRESSURE: 86 MMHG | HEIGHT: 60 IN

## 2019-01-24 DIAGNOSIS — O99.212 OBESITY AFFECTING PREGNANCY IN SECOND TRIMESTER: Primary | ICD-10-CM

## 2019-01-24 DIAGNOSIS — Z3A.21 21 WEEKS GESTATION OF PREGNANCY: ICD-10-CM

## 2019-01-24 DIAGNOSIS — O40.2XX0 POLYHYDRAMNIOS IN SECOND TRIMESTER, SINGLE OR UNSPECIFIED FETUS: ICD-10-CM

## 2019-01-24 PROBLEM — K52.9 CHRONIC DIARRHEA: Status: RESOLVED | Noted: 2018-11-14 | Resolved: 2019-01-24

## 2019-01-24 PROBLEM — R07.9 CHEST PAIN AT REST: Status: RESOLVED | Noted: 2018-06-27 | Resolved: 2019-01-24

## 2019-01-24 PROBLEM — O99.210 OBESITY AFFECTING PREGNANCY, ANTEPARTUM: Status: ACTIVE | Noted: 2019-01-24

## 2019-01-24 PROBLEM — Z3A.19 19 WEEKS GESTATION OF PREGNANCY: Status: RESOLVED | Noted: 2018-11-28 | Resolved: 2019-01-24

## 2019-01-24 PROCEDURE — 76811 OB US DETAILED SNGL FETUS: CPT | Performed by: OBSTETRICS & GYNECOLOGY

## 2019-01-24 PROCEDURE — 99212 OFFICE O/P EST SF 10 MIN: CPT | Performed by: OBSTETRICS & GYNECOLOGY

## 2019-01-24 RX ORDER — FLUCONAZOLE 150 MG/1
TABLET ORAL
Refills: 0 | Status: ON HOLD | COMMUNITY
Start: 2019-01-07 | End: 2019-02-14 | Stop reason: HOSPADM

## 2019-01-25 ENCOUNTER — TELEPHONE (OUTPATIENT)
Dept: OBGYN CLINIC | Facility: CLINIC | Age: 29
End: 2019-01-25

## 2019-01-25 ENCOUNTER — HOSPITAL ENCOUNTER (OUTPATIENT)
Facility: HOSPITAL | Age: 29
Discharge: HOME | End: 2019-01-25
Attending: OBSTETRICS & GYNECOLOGY | Admitting: OBSTETRICS & GYNECOLOGY
Payer: COMMERCIAL

## 2019-01-25 VITALS
RESPIRATION RATE: 20 BRPM | HEART RATE: 113 BPM | TEMPERATURE: 99.1 F | DIASTOLIC BLOOD PRESSURE: 84 MMHG | SYSTOLIC BLOOD PRESSURE: 128 MMHG

## 2019-01-25 PROCEDURE — 99214 OFFICE O/P EST MOD 30 MIN: CPT

## 2019-01-25 PROCEDURE — 76817 TRANSVAGINAL US OBSTETRIC: CPT | Performed by: OBSTETRICS & GYNECOLOGY

## 2019-01-25 NOTE — PROGRESS NOTES
The patient was seen today for an ultrasound  Please see ultrasound report (located under Ob Procedures) for additional details  Thank you very much for allowing us to participate in the care of this very nice patient  Should you have any questions, please do not hesitate to contact me  Bhupendra Choi MD [facility]  Attending Physician, [place]

## 2019-01-25 NOTE — PROCEDURES
Samir Rey, a  at [place] [address] with an LALO of 2019, by Last Menstrual Period, was seen at [facility] for the following procedure(s): $Procedure Type: US - Transvaginal]                   Ultrasound Other  Fetal Presentation: Transverse  Cervical Length: 34  Funnel: No  Placenta Location: Anterior

## 2019-01-25 NOTE — TELEPHONE ENCOUNTER
21w3d OB c/o of bright red bleeding with wiping and "bad" cramping  Reviewed with Dr Lorie Barajas patient advised to report to L&D  Verbalized understanding  Spoke with Kamar Womack on L&D aware patient was advised to go to triage

## 2019-01-25 NOTE — LETTER
[address] [place] [address]  Dept: [phone]    January 25, 2019     Patient: Rogerio Mata   YOB: 1990   Date of Visit: 1/23/2019       To Whom it May Concern:    Rogerio Mata is under my professional care  She was seen in the hospital from 1/23/2019   to 01/25/19  She may return to work on 01/26/19  If you have any questions or concerns, please don't hesitate to call           Sincerely,          Chanda Stovall MD

## 2019-01-25 NOTE — DISCHARGE INSTRUCTIONS
Pregnancy at 19 to 22 Weeks   None Listed: Practice bulletin no  175 summary: ultrasound in pregnancy  Obstet Gynecol 2016; 128(6):5309-0518  Josie Soares T : Prenatal Care and Testing  In: Chapo WEINER, ed  The 5 Minute Clinical Consult Standard 2015, 23rd ed  [address], 2014  FamilyDoctor  org: Your Baby's Development: The Second Trimester  [employer]  Gasper Salamanca 2011  Available from URL: [email or web address]-health/your-babys-development-the-second-trimester  printerview html  As accessed 2015-01-22  Lina Nunez VL : Prenatal Care  In: Eder Toussaint BY, Ange RF, et al, eds  [facility] Obstetrics and Gynecology, 10th ed  [address], 2008 [employer]: Your pregnant body  March of [address] 2014  Available from URL: ResearchRoots [email or web address]  org/pregnancy/how-your-baby-grows  aspx  As accessed 2015-01-25  Nettina SM: Maternal and Fetal Health  In: Bhupendra Manual of Nursing Practice, 10th ed  [address], 2013  Cheyenne Jimenez , Adi EW , & Morgan Friedman D : Preconception Counseling and Prenatal Care  In: Christiane Kruger MW, [place] JL, et al, [facility] of Gynecology and Obstetrics, 4th ed  [address], 2011  [facility] gov: Pregnancy  Office on [employer], [address] [facility] [employer]  [place], [address]  Available from URL: Gretel mitchell  html#a  As accessed 2015-01-25  [facility] gov: Pregnancy complications  Office on [address] [facility] and [employer]  [place], [address]  Available from URL: Monica cortez  As accessed 2015-02-10    © 2017 [facility] [facility] Information is for End User's use only and may not be sold, redistributed or otherwise used for commercial purposes  All illustrations and images included in CareNotes® are the copyrighted property of A D A M , Inc  or Alexey Nam  The above information is an  only  It is not intended as medical advice for individual conditions or treatments  Talk to your doctor, nurse or pharmacist before following any medical regimen to see if it is safe and effective for you

## 2019-01-25 NOTE — PROGRESS NOTES
OB Triage Note  Jed Valenzuela 29 y o  female MRN: [id number]  Unit/Bed#: LD PACU-03     LALO: Estimated Date of Delivery: 19    HPI: 29 y o   at 21w3d with c/o cramping and VB  Pt says that the cramping has been intermittent throughout the pregnancy, pt says the pain associated with the cramping is worsening however she has not noted any change in grequency or more consistenty  no LOF, and spotting  She states there is decreased FM, which began today, however pt does not have any method of monitoring  OB Cx:   - Polyhydramnios     FHT:    Baseline 152   TOCO:   Contraction Frequency (minutes): none    Vitals:   Temp:  [99 1 °F (37 3 °C)] 99 1 °F (37 3 °C)  HR:  [] 113  Resp:  [18-20] 20  BP: (128-131)/(84-86) 128/84    Physical Exam:  General: AAOX3  No acute distress  Cardiovascular: Regular, Rate and Rhythm, no murmur, gallop or rub   Lungs: Clear to Auscultation Bilaterally, no wheezing, rhonchi or rales    Abdominal: Soft  NT/ND  Gravid  : No pooling, No VB (while removing the speculum there was minimal bleeding associated with the removal of the speculum) , os closed, no discharge  Wet: Neg KOH: Neg     SVE:       Ultrasound: see fetal testing note, cervical length reassuring    A/P:  at 21w3d here for rule out  cramping and bleeding  After evaluation, pt was not found to have vaginal bleeding on initial exam nor was pt jef   Fetal testing reassuring  · Discharge home with precautions  Instructions reviewed with patient  Dr Itz Bergman aware      Signature/Title: Montana Jay MD  Date: 2019  Time: 3:47 PM

## 2019-01-31 ENCOUNTER — ROUTINE PRENATAL (OUTPATIENT)
Dept: OBGYN CLINIC | Facility: CLINIC | Age: 29
End: 2019-01-31

## 2019-01-31 VITALS — WEIGHT: 264 LBS | BODY MASS INDEX: 51.56 KG/M2 | DIASTOLIC BLOOD PRESSURE: 84 MMHG | SYSTOLIC BLOOD PRESSURE: 128 MMHG

## 2019-01-31 DIAGNOSIS — B37.3 YEAST INFECTION OF THE VAGINA: ICD-10-CM

## 2019-01-31 DIAGNOSIS — O99.210 OBESITY AFFECTING PREGNANCY, ANTEPARTUM: ICD-10-CM

## 2019-01-31 DIAGNOSIS — Z34.02 ENCOUNTER FOR SUPERVISION OF NORMAL FIRST PREGNANCY IN SECOND TRIMESTER: Primary | ICD-10-CM

## 2019-01-31 DIAGNOSIS — I10 HTN (HYPERTENSION), BENIGN: ICD-10-CM

## 2019-01-31 DIAGNOSIS — Z3A.22 22 WEEKS GESTATION OF PREGNANCY: ICD-10-CM

## 2019-01-31 DIAGNOSIS — O40.2XX0 POLYHYDRAMNIOS IN SECOND TRIMESTER, SINGLE OR UNSPECIFIED FETUS: ICD-10-CM

## 2019-01-31 PROCEDURE — PNV: Performed by: OBSTETRICS & GYNECOLOGY

## 2019-01-31 NOTE — LETTER
January 31, 2019     Patient: Tiny Canavan   YOB: 1990   Date of Visit: 1/31/2019       To Whom it May Concern:    Tiny Canavan is under my professional care  She was seen in my office on 1/31/2019 for her 5:45pm appointment  She will return to work on February 1, 2019  If you have any questions or concerns, please don't hesitate to call           Sincerely,          Carri Cameron DO        CC: No Recipients

## 2019-01-31 NOTE — PROGRESS NOTES
This is a 29 y o   at 22w2d who presents for return OB visit  Continued cramping, no more than what is typical for her  Denies leakage, bleeding  Endorses fetal movement  BP: 128/84 TWlb    Normal anatomy US with polyhydramnios  Scheduled for echo at 24 wks  Obesity: BMI 51  Growth q4 weeks and weekly NSTs starting at 36 wks  HTN: normotensive, no meds  Gave 28 week labs - instructed pt to get these done just before her next visit

## 2019-02-07 DIAGNOSIS — R12 HEART BURN: ICD-10-CM

## 2019-02-07 RX ORDER — PANTOPRAZOLE SODIUM 20 MG/1
20 TABLET, DELAYED RELEASE ORAL DAILY
Qty: 90 TABLET | Refills: 0 | Status: SHIPPED | OUTPATIENT
Start: 2019-02-07 | End: 2019-03-25 | Stop reason: SDUPTHER

## 2019-02-07 NOTE — TELEPHONE ENCOUNTER
From: Phillip Larose  Sent: 2/7/2019 11:26 AM EST  Subject: Medication Renewal Request    Phillip Larose would like a refill of the following medications:     pantoprazole (PROTONIX) 20 mg tablet [Harini Kenyon MD]    Preferred pharmacy: [id number]

## 2019-02-08 ENCOUNTER — TELEPHONE (OUTPATIENT)
Dept: OBGYN CLINIC | Facility: CLINIC | Age: 29
End: 2019-02-08

## 2019-02-08 NOTE — TELEPHONE ENCOUNTER
Patient calling about protonix prescription  She wants it it go to Elie[facility] Meza can have it transferred from [facility]  Verbalized understanding

## 2019-02-11 ENCOUNTER — ROUTINE PRENATAL (OUTPATIENT)
Dept: PERINATAL CARE | Facility: CLINIC | Age: 29
End: 2019-02-11
Payer: COMMERCIAL

## 2019-02-11 VITALS
HEART RATE: 99 BPM | WEIGHT: 267.6 LBS | SYSTOLIC BLOOD PRESSURE: 131 MMHG | DIASTOLIC BLOOD PRESSURE: 79 MMHG | BODY MASS INDEX: 52.54 KG/M2 | HEIGHT: 60 IN

## 2019-02-11 DIAGNOSIS — O40.2XX0 POLYHYDRAMNIOS IN SECOND TRIMESTER, SINGLE OR UNSPECIFIED FETUS: Primary | ICD-10-CM

## 2019-02-11 DIAGNOSIS — Z3A.23 23 WEEKS GESTATION OF PREGNANCY: ICD-10-CM

## 2019-02-11 PROCEDURE — 76827 ECHO EXAM OF FETAL HEART: CPT | Performed by: OBSTETRICS & GYNECOLOGY

## 2019-02-11 PROCEDURE — 76825 ECHO EXAM OF FETAL HEART: CPT | Performed by: OBSTETRICS & GYNECOLOGY

## 2019-02-11 PROCEDURE — 93325 DOPPLER ECHO COLOR FLOW MAPG: CPT | Performed by: OBSTETRICS & GYNECOLOGY

## 2019-02-11 NOTE — LETTER
February 11, 2019     MD [address]  [facility]    Patient: Raquel Li   YOB: 1990   Date of Visit: 2/11/2019       Dear Dr Shannon Knox: Thank you for referring Raquel Li to me for evaluation  Below are my notes for this consultation  If you have questions, please do not hesitate to call me  I look forward to following your patient along with you  Sincerely,        Chong Bone MD        CC: No Recipients  Chong Bone MD  2/11/2019  7:01 PM  Sign at close encounter  Please refer to the [facility] ultrasound report in Ob Procedures for additional information regarding the visit to the [facility]  today

## 2019-02-12 NOTE — PROGRESS NOTES
Please refer to the [facility] ultrasound report in Ob Procedures for additional information regarding the visit to the [facility]  today

## 2019-02-14 ENCOUNTER — TELEPHONE (OUTPATIENT)
Dept: OBGYN CLINIC | Facility: CLINIC | Age: 29
End: 2019-02-14

## 2019-02-14 ENCOUNTER — HOSPITAL ENCOUNTER (OUTPATIENT)
Facility: HOSPITAL | Age: 29
Discharge: HOME | End: 2019-02-14
Attending: OBSTETRICS & GYNECOLOGY | Admitting: OBSTETRICS & GYNECOLOGY
Payer: COMMERCIAL

## 2019-02-14 ENCOUNTER — HOSPITAL ENCOUNTER (OUTPATIENT)
Facility: HOSPITAL | Age: 29
End: 2019-02-14
Attending: OBSTETRICS & GYNECOLOGY | Admitting: OBSTETRICS & GYNECOLOGY
Payer: COMMERCIAL

## 2019-02-14 VITALS
HEIGHT: 60 IN | SYSTOLIC BLOOD PRESSURE: 130 MMHG | TEMPERATURE: 98.9 F | DIASTOLIC BLOOD PRESSURE: 66 MMHG | RESPIRATION RATE: 16 BRPM | HEART RATE: 103 BPM | BODY MASS INDEX: 52.42 KG/M2 | WEIGHT: 267 LBS

## 2019-02-14 LAB
ALBUMIN SERPL BCP-MCNC: 2.6 G/DL (ref 3.5–5)
ALP SERPL-CCNC: 89 U/L (ref 46–116)
ALT SERPL W P-5'-P-CCNC: 15 U/L (ref 12–78)
ANION GAP SERPL CALCULATED.3IONS-SCNC: 8 MMOL/L (ref 4–13)
AST SERPL W P-5'-P-CCNC: 12 U/L (ref 5–45)
BACTERIA UR QL AUTO: ABNORMAL /HPF
BASOPHILS # BLD AUTO: 0.03 THOUSANDS/ΜL (ref 0–0.1)
BASOPHILS NFR BLD AUTO: 0 % (ref 0–1)
BILIRUB SERPL-MCNC: 0.22 MG/DL (ref 0.2–1)
BILIRUB UR QL STRIP: NEGATIVE
BUN SERPL-MCNC: 5 MG/DL (ref 5–25)
CALCIUM SERPL-MCNC: 8.8 MG/DL (ref 8.3–10.1)
CHLORIDE SERPL-SCNC: 105 MMOL/L (ref 100–108)
CLARITY UR: CLEAR
CO2 SERPL-SCNC: 24 MMOL/L (ref 21–32)
COLOR UR: YELLOW
CREAT SERPL-MCNC: 0.45 MG/DL (ref 0.6–1.3)
CREAT UR-MCNC: 45.2 MG/DL
EOSINOPHIL # BLD AUTO: 0.07 THOUSAND/ΜL (ref 0–0.61)
EOSINOPHIL NFR BLD AUTO: 1 % (ref 0–6)
ERYTHROCYTE [DISTWIDTH] IN BLOOD BY AUTOMATED COUNT: 13.4 % (ref 11.6–15.1)
GFR SERPL CREATININE-BSD FRML MDRD: 137 ML/MIN/1.73SQ M
GLUCOSE SERPL-MCNC: 83 MG/DL (ref 65–140)
GLUCOSE UR STRIP-MCNC: NEGATIVE MG/DL
HCT VFR BLD AUTO: 37.4 % (ref 34.8–46.1)
HGB BLD-MCNC: 11.8 G/DL (ref 11.5–15.4)
HGB UR QL STRIP.AUTO: NEGATIVE
HYALINE CASTS #/AREA URNS LPF: ABNORMAL /LPF
IMM GRANULOCYTES # BLD AUTO: 0.1 THOUSAND/UL (ref 0–0.2)
IMM GRANULOCYTES NFR BLD AUTO: 1 % (ref 0–2)
KETONES UR STRIP-MCNC: NEGATIVE MG/DL
LEUKOCYTE ESTERASE UR QL STRIP: ABNORMAL
LYMPHOCYTES # BLD AUTO: 1.77 THOUSANDS/ΜL (ref 0.6–4.47)
LYMPHOCYTES NFR BLD AUTO: 14 % (ref 14–44)
MCH RBC QN AUTO: 27.2 PG (ref 26.8–34.3)
MCHC RBC AUTO-ENTMCNC: 31.6 G/DL (ref 31.4–37.4)
MCV RBC AUTO: 86 FL (ref 82–98)
MONOCYTES # BLD AUTO: 0.86 THOUSAND/ΜL (ref 0.17–1.22)
MONOCYTES NFR BLD AUTO: 7 % (ref 4–12)
NEUTROPHILS # BLD AUTO: 10.1 THOUSANDS/ΜL (ref 1.85–7.62)
NEUTS SEG NFR BLD AUTO: 77 % (ref 43–75)
NITRITE UR QL STRIP: NEGATIVE
NON-SQ EPI CELLS URNS QL MICRO: ABNORMAL /HPF
NRBC BLD AUTO-RTO: 0 /100 WBCS
PH UR STRIP.AUTO: 6 [PH] (ref 4.5–8)
PLATELET # BLD AUTO: 304 THOUSANDS/UL (ref 149–390)
PMV BLD AUTO: 10.2 FL (ref 8.9–12.7)
POTASSIUM SERPL-SCNC: 3.5 MMOL/L (ref 3.5–5.3)
PROT SERPL-MCNC: 7.2 G/DL (ref 6.4–8.2)
PROT UR STRIP-MCNC: NEGATIVE MG/DL
PROT UR-MCNC: <6 MG/DL
PROT/CREAT UR: <0.13 MG/G{CREAT} (ref 0–0.1)
RBC # BLD AUTO: 4.34 MILLION/UL (ref 3.81–5.12)
RBC #/AREA URNS AUTO: ABNORMAL /HPF
SODIUM SERPL-SCNC: 137 MMOL/L (ref 136–145)
SP GR UR STRIP.AUTO: 1.01 (ref 1–1.03)
UROBILINOGEN UR QL STRIP.AUTO: 0.2 E.U./DL
WBC # BLD AUTO: 12.93 THOUSAND/UL (ref 4.31–10.16)
WBC #/AREA URNS AUTO: ABNORMAL /HPF

## 2019-02-14 PROCEDURE — 76817 TRANSVAGINAL US OBSTETRIC: CPT | Performed by: OBSTETRICS & GYNECOLOGY

## 2019-02-14 PROCEDURE — 99213 OFFICE O/P EST LOW 20 MIN: CPT

## 2019-02-14 PROCEDURE — 82570 ASSAY OF URINE CREATININE: CPT | Performed by: OBSTETRICS & GYNECOLOGY

## 2019-02-14 PROCEDURE — 81001 URINALYSIS AUTO W/SCOPE: CPT | Performed by: OBSTETRICS & GYNECOLOGY

## 2019-02-14 PROCEDURE — 80053 COMPREHEN METABOLIC PANEL: CPT | Performed by: OBSTETRICS & GYNECOLOGY

## 2019-02-14 PROCEDURE — 85025 COMPLETE CBC W/AUTO DIFF WBC: CPT | Performed by: OBSTETRICS & GYNECOLOGY

## 2019-02-14 PROCEDURE — 84156 ASSAY OF PROTEIN URINE: CPT | Performed by: OBSTETRICS & GYNECOLOGY

## 2019-02-14 NOTE — LETTER
[address] [place] [address]  Dept: [phone]    February 14, 2019     Patient: Raquel Li   YOB: 1990   Date of Visit: 2/14/2019       To Whom it May Concern:    Raquel Li is under my professional care  She was seen in the hospital from 2/14/2019   to 02/14/19  She may return to work on 2/15/19  If you have any questions or concerns, please don't hesitate to call           Sincerely,          Hampton Cooks, MD

## 2019-02-14 NOTE — TELEPHONE ENCOUNTER
24wk Ob calling c/o /108 pulse 129 (taken by nurse)  States she was having cramping all night  Advised patient to report to L&D [facility]  Verbalized understanding  Spoke with Chelsey Urbina aware of patient  Routing to provider to update

## 2019-02-14 NOTE — PROGRESS NOTES
L&D Triage Note - OB/GYN  Angie Riddles 29 y o  female MRN: [id number]  Unit/Bed#: LD Triage  Encounter: [id number]      Assessment:  29 y o   at 24w2d ***    Plan:  1  ***      ______________________________________________________________________      Chief Compliant: ***    TIME: ***  Subjective:  29 y o   at 24w2d ***      Objective:  Vitals:    19 1009   Resp: 16   Temp: 98 9 °F (37 2 °C)       SVE: {gen number 6-84:909064} / {Effacement :80940} / {[facility]:16616}  FHT:  *** / {R Variability:46830} / ***, ***  [place]: q***          Jonn Ayers DO 2019 10:49 AM

## 2019-02-14 NOTE — PROCEDURES
Samir Rey, a  at 24w2d with an LALO of 2019, by Last Menstrual Period, was seen at [address] for the following procedure(s): $Procedure Type: US - Transvaginal]                   Ultrasound Other  Fetal Presentation: Vertex  Cervical Length: 5 4  Funnel: No  Debris: No  Placenta Previa: No  Vasa Previa: No        Judd Alex, DO

## 2019-02-14 NOTE — PROGRESS NOTES
Triage Note - OB  [facility] 29 [facility]  [facility] MRN: [id number]  Unit/Bed#: LD Triage  Encounter: [id number]    Chief Complaint: Abdominal cramping LALO: Estimated Date of Delivery: 19    HPI: Patient is a  at 24w2d here complaining of lower abdominal cramping and headaches throbbing pain, bilateral in the occipital area without visual changes and 5/10 intensity of pain  Patient denies LOF, VB, SOB  Patient reports BP at work was 168/108 around 9:30 and associated chest tightness, likely related to stress  At triage BP was 126/68 , Repeat 130/66  Vitals: Temperature 98 9 °F (37 2 °C), temperature source Oral, resp  rate 16, height 5' (1 524 m), weight 121 kg (267 lb), last menstrual period 2018, unknown if currently breastfeeding  ,Body mass index is 52 14 kg/m²  Physical Exam  GEN: Well developed,   HEENT: Normocephalic, atraumatic  Card: RRR, no murmurs, rubs or gallops  Pulm: Good air entry, no respiratory distress, no wheezes, crackles, rales  Abdomen: , tenderness reproducible to palpation in lower abdomen  SVE: Not evaluated  TVUS: Cervical length 5 4-5 68  KOH: Neg    Wet mount: Neg    FHR: Baseline: 130 bpm, Variability: Moderate 6 - 25 bpm and Category 1  [place]: none    Labs: No results found for this or any previous visit (from the past 24 hour(s))  Imaging: none    Lab, Imaging and other studies: I have personally reviewed pertinent reports  A/P: This is a 29 y o   at 24w2d who presents to triage with elevated BP and abdominal cramping  1) Preeclamptic labs ordered wnl  2) BP check every 15 min wnl  3) KOH, wet mount negative  3) Discharge instructions given to patient and labor precautions reviewed   Patient to be seen in the office next week    Constance Evans MD  [facility] Practice Resident PGY1  2019 10:45 AM

## 2019-02-14 NOTE — DISCHARGE INSTRUCTIONS
Pregnancy at 23 to 26 [address]:   You are now close to or at the beginning of the third trimester  The third trimester starts at 24 weeks and ends with delivery  As your baby gets larger, you may develop certain symptoms  These may include pain in your back or down the sides of your abdomen  You may also have stretch marks on your abdomen, breasts, thighs, or buttocks  You may also have constipation  DISCHARGE INSTRUCTIONS:   Seek care immediately if:   · You develop a severe headache that does not go away  · You have new or increased vision changes, such as blurred or spotted vision  · You have new or increased swelling in your face or hands  · You have vaginal spotting or bleeding  · Your water broke or you feel warm water gushing or trickling from your vagina  Contact your healthcare provider if:   · You have abdominal cramps, pressure, or tightening  · You have a change in vaginal discharge  · You have light bleeding  · You have chills or a fever  · You have vaginal itching, burning, or pain  · You have yellow, green, white, or foul-smelling vaginal discharge  · You have pain or burning when you urinate, less urine than usual, or pink or bloody urine  · You have questions or concerns about your condition or care  How to care for yourself at this stage of your pregnancy:   · Eat a variety of healthy foods  Healthy foods include fruits, vegetables, whole-grain breads, low-fat dairy foods, beans, lean meats, and fish  Drink liquids as directed  Ask how much liquid to drink each day and which liquids are best for you  Limit caffeine to less than 200 milligrams each day  Limit your intake of fish to 2 servings each week  Choose fish low in mercury such as canned light tuna, shrimp, salmon, cod, or tilapia  Do not  eat fish high in mercury such as swordfish, tilefish, chance mackerel, and shark  · Manage back pain    Do not stand for long periods of time or lift heavy items  Use good posture while you stand, squat, or bend  Wear low-heeled shoes with good support  Rest may also help to relieve back pain  Ask your healthcare provider about exercises you can do to strengthen your back muscles  · Take prenatal vitamins as directed  Your need for certain vitamins and minerals, such as folic acid, increases during pregnancy  Prenatal vitamins provide some of the extra vitamins and minerals you need  Prenatal vitamins may also help to decrease the risk of certain birth defects  · Talk to your healthcare provider about exercise  Moderate exercise can help you stay fit  Your healthcare provider will help you plan an exercise program that is safe for you during pregnancy  · Do not smoke  If you smoke, it is never too late to quit  Smoking increases your risk of a miscarriage and other health problems during your pregnancy  Smoking can cause your baby to be born too early or weigh less at birth  Ask your healthcare provider for information if you need help quitting  · Do not drink alcohol  Alcohol passes from your body to your baby through the placenta  It can affect your baby's brain development and cause fetal alcohol syndrome (FAS)  FAS is a group of conditions that causes mental, behavior, and growth problems  · Talk to your healthcare provider before you take any medicines  Many medicines may harm your baby if you take them when you are pregnant  Do not take any medicines, vitamins, herbs, or supplements without first talking to your healthcare provider  Never use illegal or street drugs (such as marijuana or cocaine) while you are pregnant  Safety tips:   · Avoid hot tubs and saunas  Do not use a hot tub or sauna while you are pregnant, especially during your first trimester  Hot tubs and saunas may raise your baby's temperature and increase the risk of birth defects  · Avoid toxoplasmosis    This is an infection caused by eating raw meat or being around infected cat feces  It can cause birth defects, miscarriages, and other problems  Wash your hands after you touch raw meat  Make sure any meat is well-cooked before you eat it  Avoid raw eggs and unpasteurized milk  Use gloves or ask someone else to clean your cat's litter box while you are pregnant  Changes that are happening with your baby:  By 26 weeks, your baby will weigh about 2 pounds  Your baby will be about 10 inches long from the top of the head to the rump (baby's bottom)  Your baby's movements are much stronger now  Your baby's eyes are almost completely formed and can partially open  Your baby also sleeps and wakes up  What you need to know about prenatal care: Your healthcare provider will check your blood pressure and weight  You may also need the following:  · A urine test  may also be done to check for sugar and protein  These can be signs of gestational diabetes or infection  Protein in your urine may also be a sign of preeclampsia  Preeclampsia is a condition that can develop during week 20 or later of your pregnancy  It causes high blood pressure, and it can cause problems with your kidneys and other organs  · Fundal height  is a measurement of your uterus to check your baby's growth  This number is usually the same as the number of weeks that you have been pregnant  · Your baby's heart rate  will be checked  © 2017 [address] Information is for End User's use only and may not be sold, redistributed or otherwise used for commercial purposes  All illustrations and images included in CareNotes® are the copyrighted property of A D A M , Inc  or Alexey Nam  The above information is an  only  It is not intended as medical advice for individual conditions or treatments  Talk to your doctor, nurse or pharmacist before following any medical regimen to see if it is safe and effective for you

## 2019-02-14 NOTE — LETTER
[address] [address]  Dept: [phone]    February 14, 2019     Patient: Tiny Canavan   YOB: 1990   Date of Visit: 2/14/2019       To Whom it May Concern:    Tiny Canavan is under my professional care  She was seen in the hospital from 10:15 2/14/2019   to 12:00 02/14/19  She may return to work on 2/14/19  If you have any questions or concerns, please don't hesitate to call           Sincerely,          Everton Orta MD

## 2019-02-16 ENCOUNTER — APPOINTMENT (OUTPATIENT)
Dept: LAB | Facility: HOSPITAL | Age: 29
End: 2019-02-16
Attending: OBSTETRICS & GYNECOLOGY
Payer: COMMERCIAL

## 2019-02-16 LAB
BASOPHILS # BLD AUTO: 0.03 THOUSANDS/ΜL (ref 0–0.1)
BASOPHILS NFR BLD AUTO: 0 % (ref 0–1)
EOSINOPHIL # BLD AUTO: 0.08 THOUSAND/ΜL (ref 0–0.61)
EOSINOPHIL NFR BLD AUTO: 1 % (ref 0–6)
ERYTHROCYTE [DISTWIDTH] IN BLOOD BY AUTOMATED COUNT: 13.5 % (ref 11.6–15.1)
GLUCOSE 1H P 50 G GLC PO SERPL-MCNC: 117 MG/DL
HCT VFR BLD AUTO: 37.1 % (ref 34.8–46.1)
HGB BLD-MCNC: 11.9 G/DL (ref 11.5–15.4)
IMM GRANULOCYTES # BLD AUTO: 0.09 THOUSAND/UL (ref 0–0.2)
IMM GRANULOCYTES NFR BLD AUTO: 1 % (ref 0–2)
LYMPHOCYTES # BLD AUTO: 1.56 THOUSANDS/ΜL (ref 0.6–4.47)
LYMPHOCYTES NFR BLD AUTO: 15 % (ref 14–44)
MCH RBC QN AUTO: 27.9 PG (ref 26.8–34.3)
MCHC RBC AUTO-ENTMCNC: 32.1 G/DL (ref 31.4–37.4)
MCV RBC AUTO: 87 FL (ref 82–98)
MONOCYTES # BLD AUTO: 0.64 THOUSAND/ΜL (ref 0.17–1.22)
MONOCYTES NFR BLD AUTO: 6 % (ref 4–12)
NEUTROPHILS # BLD AUTO: 7.92 THOUSANDS/ΜL (ref 1.85–7.62)
NEUTS SEG NFR BLD AUTO: 77 % (ref 43–75)
NRBC BLD AUTO-RTO: 0 /100 WBCS
PLATELET # BLD AUTO: 294 THOUSANDS/UL (ref 149–390)
PMV BLD AUTO: 10.4 FL (ref 8.9–12.7)
RBC # BLD AUTO: 4.26 MILLION/UL (ref 3.81–5.12)
WBC # BLD AUTO: 10.32 THOUSAND/UL (ref 4.31–10.16)

## 2019-02-16 PROCEDURE — 86592 SYPHILIS TEST NON-TREP QUAL: CPT | Performed by: OBSTETRICS & GYNECOLOGY

## 2019-02-16 PROCEDURE — 85025 COMPLETE CBC W/AUTO DIFF WBC: CPT | Performed by: OBSTETRICS & GYNECOLOGY

## 2019-02-16 PROCEDURE — 82950 GLUCOSE TEST: CPT | Performed by: OBSTETRICS & GYNECOLOGY

## 2019-02-16 PROCEDURE — 36415 COLL VENOUS BLD VENIPUNCTURE: CPT | Performed by: OBSTETRICS & GYNECOLOGY

## 2019-02-18 LAB — RPR SER QL: NORMAL

## 2019-02-20 ENCOUNTER — TELEPHONE (OUTPATIENT)
Dept: OBGYN CLINIC | Facility: CLINIC | Age: 29
End: 2019-02-20

## 2019-02-20 DIAGNOSIS — N94.9 GENITAL LESION, FEMALE: Primary | ICD-10-CM

## 2019-02-20 NOTE — TELEPHONE ENCOUNTER
25w1d OB calling c/o nickel size blister on the  labia after shaving  Patient states area is sore and slightly yellowish  Advised most likely due to shaving, apply warm compresses, loose fitting clothing and monitor  Verbalized understanding  Routing to provider for further advise

## 2019-02-21 PROBLEM — B37.31 YEAST INFECTION OF THE VAGINA: Status: RESOLVED | Noted: 2019-01-07 | Resolved: 2019-02-21

## 2019-02-21 PROBLEM — B37.3 YEAST INFECTION OF THE VAGINA: Status: RESOLVED | Noted: 2019-01-07 | Resolved: 2019-02-21

## 2019-02-21 NOTE — TELEPHONE ENCOUNTER
Spoke with patient advised provider will send Valtrex to pharmacy  She may need to  take up until the end of pregnancy  Advised if not feeling better after 3 days to call the office  Verbalized understanding

## 2019-02-21 NOTE — TELEPHONE ENCOUNTER
Patient called states blister is now an open lesion yellowish in color  States it is very irritating  It is in the area where she shaves  Routing to provider for advise

## 2019-02-22 ENCOUNTER — TRANSCRIBE ORDERS (OUTPATIENT)
Dept: LAB | Facility: CLINIC | Age: 29
End: 2019-02-22

## 2019-02-22 ENCOUNTER — APPOINTMENT (OUTPATIENT)
Dept: LAB | Facility: CLINIC | Age: 29
End: 2019-02-22
Payer: COMMERCIAL

## 2019-02-22 DIAGNOSIS — N94.9 GENITAL LESION, FEMALE: ICD-10-CM

## 2019-02-22 PROCEDURE — 86696 HERPES SIMPLEX TYPE 2 TEST: CPT

## 2019-02-22 PROCEDURE — 86695 HERPES SIMPLEX TYPE 1 TEST: CPT

## 2019-02-22 NOTE — TELEPHONE ENCOUNTER
Spoke with patient advised - no dx of HSV in chart  Advised to repeat testing HSV testing  Most likely a blister from shaving  Advised to stop shaving, apply Aquaphor and loose fitting clothes  Pt verbalized understanding  States she may go to urgent care to get blister cultured  Verbalized understanding

## 2019-02-22 NOTE — TELEPHONE ENCOUNTER
Reviewed chart - no dx of HSV, neg HSV labs in care everywhere last year  Ordered antibody testing again, likely blister from shaving

## 2019-02-25 ENCOUNTER — TELEPHONE (OUTPATIENT)
Dept: OBGYN CLINIC | Facility: CLINIC | Age: 29
End: 2019-02-25

## 2019-02-25 NOTE — TELEPHONE ENCOUNTER
Patient call for blood results  Advised they are not done yet  State labial lesion is getting better, advised to continue warm compresses and Aquaphor  We will call when we get tests results  Verbalized understanding

## 2019-02-26 ENCOUNTER — TELEPHONE (OUTPATIENT)
Dept: OBGYN CLINIC | Facility: CLINIC | Age: 29
End: 2019-02-26

## 2019-02-26 NOTE — TELEPHONE ENCOUNTER
Patient states she call the lab regarding labs drawn on 2/22/19 they told her results were sent to office on 2/23/19  Advised patient state computer states results are still pending  Routing to provider for advise

## 2019-02-28 ENCOUNTER — ROUTINE PRENATAL (OUTPATIENT)
Dept: OBGYN CLINIC | Facility: CLINIC | Age: 29
End: 2019-02-28

## 2019-02-28 VITALS — BODY MASS INDEX: 52.34 KG/M2 | WEIGHT: 268 LBS | SYSTOLIC BLOOD PRESSURE: 126 MMHG | DIASTOLIC BLOOD PRESSURE: 76 MMHG

## 2019-02-28 DIAGNOSIS — I10 HTN (HYPERTENSION), BENIGN: ICD-10-CM

## 2019-02-28 DIAGNOSIS — O99.210 OBESITY AFFECTING PREGNANCY, ANTEPARTUM: ICD-10-CM

## 2019-02-28 DIAGNOSIS — O40.2XX0 POLYHYDRAMNIOS IN SECOND TRIMESTER, SINGLE OR UNSPECIFIED FETUS: ICD-10-CM

## 2019-02-28 PROBLEM — Z3A.26 26 WEEKS GESTATION OF PREGNANCY: Status: ACTIVE | Noted: 2019-01-23

## 2019-02-28 PROCEDURE — PNV: Performed by: OBSTETRICS & GYNECOLOGY

## 2019-02-28 NOTE — PROGRESS NOTES
This is a 29 y o   at 26w2d who presents for return OB visit  No complaints other than fatigue and swelling  Denies contractions, leakage, bleeding  Endorses fetal movement  BP: 126/76 TWlb    Fatigue: discussed sleep hygiene, may try unisom or benadryl at night  LE swelling: encouraged use of compression socks when working  Polyhydramnios: following up with [facility] in 3 weeks  Normal fetal echo  Obesity: BMI 52  Passed glucola  Growths scheduled  NSTs at 36 wks     HTN: normotensive, no meds

## 2019-03-01 ENCOUNTER — TELEPHONE (OUTPATIENT)
Dept: OBGYN CLINIC | Facility: CLINIC | Age: 29
End: 2019-03-01

## 2019-03-01 DIAGNOSIS — B00.9 HSV INFECTION: Primary | ICD-10-CM

## 2019-03-01 DIAGNOSIS — O40.2XX0 POLYHYDRAMNIOS IN SECOND TRIMESTER, SINGLE OR UNSPECIFIED FETUS: ICD-10-CM

## 2019-03-01 DIAGNOSIS — I10 HTN (HYPERTENSION), BENIGN: ICD-10-CM

## 2019-03-01 RX ORDER — VALACYCLOVIR HYDROCHLORIDE 500 MG/1
500 TABLET, FILM COATED ORAL 2 TIMES DAILY
Qty: 180 TABLET | Refills: 2 | Status: SHIPPED | OUTPATIENT
Start: 2019-03-01 | End: 2019-03-25 | Stop reason: SDUPTHER

## 2019-03-01 NOTE — PROGRESS NOTES
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

## 2019-03-01 NOTE — TELEPHONE ENCOUNTER
Patient states if she doesn't  phone please leave detailed message on cell phone   Routing to provider to update

## 2019-03-04 LAB
HSV1 IGG SER IA-ACNC: NORMAL
HSV2 IGG SER IA-ACNC: NORMAL

## 2019-03-06 ENCOUNTER — OFFICE VISIT (OUTPATIENT)
Dept: FAMILY MEDICINE CLINIC | Facility: CLINIC | Age: 29
End: 2019-03-06
Payer: COMMERCIAL

## 2019-03-06 VITALS
DIASTOLIC BLOOD PRESSURE: 78 MMHG | SYSTOLIC BLOOD PRESSURE: 130 MMHG | HEIGHT: 62 IN | WEIGHT: 273 LBS | BODY MASS INDEX: 50.24 KG/M2 | TEMPERATURE: 98.5 F | OXYGEN SATURATION: 98 % | HEART RATE: 108 BPM

## 2019-03-06 DIAGNOSIS — Z3A.27 27 WEEKS GESTATION OF PREGNANCY: ICD-10-CM

## 2019-03-06 DIAGNOSIS — S39.012A STRAIN OF LUMBAR REGION, INITIAL ENCOUNTER: Primary | ICD-10-CM

## 2019-03-06 PROCEDURE — 99213 OFFICE O/P EST LOW 20 MIN: CPT | Performed by: FAMILY MEDICINE

## 2019-03-06 PROCEDURE — 3008F BODY MASS INDEX DOCD: CPT | Performed by: FAMILY MEDICINE

## 2019-03-06 NOTE — PROGRESS NOTES
Assessment/Plan:  No significant physical findings on exam   She likely has lumbar strain that is mild-to-moderate  Recommend use of Tylenol  Recommended heat and also recommended physical therapy  No will be provided for work as she may not be able to work over the next 2-3 days secondary to back pain  She will call with any worsening symptoms including radiculopathy symptoms  Follow-up with OBGYN for routine prenatal care  She will call with any complicating symptoms with regard to her pregnancy  No problem-specific Assessment & Plan notes found for this encounter  Diagnoses and all orders for this visit:    Strain of lumbar region, initial encounter  -     Ambulatory referral to Physical Therapy; Future    27 weeks gestation of pregnancy          Subjective:      Patient ID: Elizabet Martinez is a 29 y o  female  Patient with bending over this morning and felt a pull to the low back and bilateral hip area  She is mild-to-moderate discomfort at both areas without a bowel or bladder continence  Denies significant radiculopathy symptoms into the legs  She is 27 weeks pregnant and continues to follow with OBGYN for routine prenatal care  She is not currently taking anything for the pain  Denies any mid to upper back pain  No abdominal pain  No abnormal bleeding  No contractions  Back Pain     Groin Pain   Associated symptoms include back pain  The following portions of the patient's history were reviewed and updated as appropriate: allergies, current medications, past family history, past medical history, past social history, past surgical history and problem list     Review of Systems   Constitutional: Negative  HENT: Negative  Eyes: Negative  Respiratory: Negative  Cardiovascular: Negative  Gastrointestinal: Negative  Endocrine: Negative  Genitourinary: Negative  Musculoskeletal: Positive for back pain  Skin: Negative  Allergic/Immunologic: Negative  Neurological: Negative  Hematological: Negative  Psychiatric/Behavioral: Negative  Objective:      /78 (BP Location: Left arm, Patient Position: Sitting, Cuff Size: Large)   Pulse (!) 108   Temp 98 5 °F (36 9 °C) (Tympanic)   Ht 5' 1 81" (1 57 m)   Wt 124 kg (273 lb)   LMP 08/28/2018 (Exact Date)   SpO2 98%   BMI 50 24 kg/m²          Physical Exam   Constitutional: She is oriented to person, place, and time  She appears well-developed and well-nourished  HENT:   Head: Normocephalic and atraumatic  Right Ear: External ear normal  Tympanic membrane is not erythematous and not bulging  Left Ear: External ear normal  Tympanic membrane is not erythematous and not bulging  Nose: Nose normal    Mouth/Throat: Oropharynx is clear and moist and mucous membranes are normal  No oral lesions  No oropharyngeal exudate  Eyes: Conjunctivae and EOM are normal  Right eye exhibits no discharge  Left eye exhibits no discharge  No scleral icterus  Neck: Normal range of motion  Neck supple  No thyromegaly present  Cardiovascular: Normal rate, regular rhythm and normal heart sounds  Exam reveals no gallop and no friction rub  No murmur heard  Pulmonary/Chest: Effort normal  No respiratory distress  She has no wheezes  She has no rales  She exhibits no tenderness  Abdominal: Soft  Bowel sounds are normal  She exhibits no distension and no mass  There is no tenderness  There is no rebound and no guarding  Musculoskeletal: Normal range of motion  She exhibits no edema, tenderness or deformity  Lymphadenopathy:     She has no cervical adenopathy  Neurological: She is alert and oriented to person, place, and time  She has normal reflexes  No cranial nerve deficit  She exhibits normal muscle tone  Coordination normal    Skin: Skin is warm and dry  No rash noted  No erythema  No pallor  Psychiatric: She has a normal mood and affect  Her behavior is normal    Vitals reviewed

## 2019-03-06 NOTE — LETTER
March 6, 2019     Patient: Amanda Marinelli   YOB: 1990   Date of Visit: 3/6/2019       To Whom it May Concern:    Amanda Marinelli is under my professional care  She was seen in my office on 3/6/2019  She may return to work on 3/11/2019  If you have any questions or concerns, please don't hesitate to call           Sincerely,          Giselle Braun DO        CC: No Recipients

## 2019-03-08 ENCOUNTER — TELEPHONE (OUTPATIENT)
Dept: FAMILY MEDICINE CLINIC | Facility: CLINIC | Age: 29
End: 2019-03-08

## 2019-03-08 NOTE — TELEPHONE ENCOUNTER
I would still recommend physical therapy  She may also like to check with the physical therapy offices in the [place] as many physical therapy offices have nighttime hours to accommodate her work schedule

## 2019-03-08 NOTE — TELEPHONE ENCOUNTER
Mayra Hobbs called and stated her back is still hurting  You recommended physical therapy however she was not able to schedule PT due to her work hours  She is scheduled to go back to work on Monday, 03/11/2019 however she needs to let HR know if she is able to go to work Monday with her back still hurting or should she stay out to receive some PT next week and see how she responds to the PT? 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

## 2019-03-11 NOTE — TELEPHONE ENCOUNTER
Patient informed of recommendations  Patient also stated that she is still have low back pain and is now experiencing numbness in her feet

## 2019-03-12 ENCOUNTER — ULTRASOUND (OUTPATIENT)
Dept: PERINATAL CARE | Facility: CLINIC | Age: 29
End: 2019-03-12
Payer: COMMERCIAL

## 2019-03-12 ENCOUNTER — TELEPHONE (OUTPATIENT)
Dept: PERINATAL CARE | Facility: CLINIC | Age: 29
End: 2019-03-12

## 2019-03-12 ENCOUNTER — ROUTINE PRENATAL (OUTPATIENT)
Dept: OBGYN CLINIC | Facility: CLINIC | Age: 29
End: 2019-03-12
Payer: COMMERCIAL

## 2019-03-12 VITALS
DIASTOLIC BLOOD PRESSURE: 88 MMHG | HEART RATE: 118 BPM | HEIGHT: 60 IN | SYSTOLIC BLOOD PRESSURE: 135 MMHG | BODY MASS INDEX: 53.2 KG/M2 | WEIGHT: 271 LBS

## 2019-03-12 VITALS — SYSTOLIC BLOOD PRESSURE: 144 MMHG | BODY MASS INDEX: 52.73 KG/M2 | WEIGHT: 270 LBS | DIASTOLIC BLOOD PRESSURE: 92 MMHG

## 2019-03-12 DIAGNOSIS — Z23 NEED FOR TDAP VACCINATION: ICD-10-CM

## 2019-03-12 DIAGNOSIS — O99.210 OBESITY AFFECTING PREGNANCY, ANTEPARTUM: ICD-10-CM

## 2019-03-12 DIAGNOSIS — O16.3 HYPERTENSION AFFECTING PREGNANCY IN THIRD TRIMESTER: ICD-10-CM

## 2019-03-12 DIAGNOSIS — Z3A.28 28 WEEKS GESTATION OF PREGNANCY: ICD-10-CM

## 2019-03-12 DIAGNOSIS — I10 HTN (HYPERTENSION), BENIGN: ICD-10-CM

## 2019-03-12 DIAGNOSIS — Z34.03 ENCOUNTER FOR SUPERVISION OF NORMAL FIRST PREGNANCY IN THIRD TRIMESTER: Primary | ICD-10-CM

## 2019-03-12 DIAGNOSIS — Z36.89 ENCOUNTER FOR ULTRASOUND TO CHECK FETAL GROWTH: ICD-10-CM

## 2019-03-12 DIAGNOSIS — K42.9 UMBILICAL HERNIA WITHOUT OBSTRUCTION AND WITHOUT GANGRENE: Primary | ICD-10-CM

## 2019-03-12 PROBLEM — O40.2XX0 POLYHYDRAMNIOS IN SECOND TRIMESTER: Status: RESOLVED | Noted: 2019-01-24 | Resolved: 2019-03-12

## 2019-03-12 PROCEDURE — PNV: Performed by: OBSTETRICS & GYNECOLOGY

## 2019-03-12 PROCEDURE — 76816 OB US FOLLOW-UP PER FETUS: CPT | Performed by: OBSTETRICS & GYNECOLOGY

## 2019-03-12 PROCEDURE — 99213 OFFICE O/P EST LOW 20 MIN: CPT | Performed by: OBSTETRICS & GYNECOLOGY

## 2019-03-12 PROCEDURE — 90715 TDAP VACCINE 7 YRS/> IM: CPT

## 2019-03-12 PROCEDURE — 90471 IMMUNIZATION ADMIN: CPT

## 2019-03-12 RX ORDER — BENZOCAINE/MENTHOL 6 MG-10 MG
LOZENGE MUCOUS MEMBRANE DAILY
Qty: 1 EACH | Refills: 0 | Status: SHIPPED | OUTPATIENT
Start: 2019-03-12 | End: 2019-06-07 | Stop reason: ALTCHOICE

## 2019-03-12 NOTE — PROGRESS NOTES
29 y o    female at 31w0d EGA for PNV  BP : 148/88  TWlbs   US reviewed from today - umbilical hernia present  Has work note til Thursday from Dr Yoni Berry  Will re-evaluate at that time  Discussed pregnancy support belt, avoid lifting >20lbs  Elevated BP today  H/o chronic hypertension  C/o mild headache  Will repeat labs today  Script given for BP cuff  Pt to check BP daily  WIll f/u on Thursday at appt  Red folder done today with Jessa  Patient has no questions  Tdap given  Rhogam not indicated  Blood type B+  Consent signed  [facility] with transfusion  Full code

## 2019-03-12 NOTE — LETTER
[facility]:  MD Dr Ana M Bateman MD Dr Debby Lolling, MD Dr Billy Bride, MD Dr Leonora Penna, MD      March 12, 2019     Patient: Raquel Li   YOB: 1990   Date of Visit: 3/12/2019       To Whom it May Concern:    Raquel Li was seen in the above office on 3/12/2019  She is excused for medical reasons 3/12-3/14/19  Subsequent notes if applicable will come from her primary obstetrician  If you have any questions or concerns, please don't hesitate to call      Sincerely,          Huseyin Mancilla MD on behalf of the above [facility]

## 2019-03-12 NOTE — PROGRESS NOTES
[address]: Ms Matt Reynoso was seen today at 28w0d for fetal growth assessment ultrasound  See ultrasound report under "OB Procedures" tab  Please don't hesitate to contact our office with any concerns or questions    Lisette Abad MD

## 2019-03-12 NOTE — Clinical Note
HI can you call me today about this patient? Found a  Big umbil hernia and she is having pain  I dont' think it is incarcearated 
2

## 2019-03-12 NOTE — TELEPHONE ENCOUNTER
PT WAS HERE THIS MORNING AND GOT A WORK NOTE THAT SAYS TO BE OUT TILL THE 14TH  SHE HAS AN OB APPT ONT HE 14TH ALSO, CAN SHE GET ANOTHER NOTE STATING TO GO BACK TO WORK ON THE 15TH

## 2019-03-12 NOTE — PROGRESS NOTES
Patient states MFM advised her she has an umbilical, states it is painful 7/10   28wk packet given and reviewed   Breast pump script given

## 2019-03-12 NOTE — PATIENT INSTRUCTIONS
Thank you for choosing [id number] [facility] for your  care today  If you have any questions about your ultrasound or care, please do not hesitate to contact us or your primary obstetrician  Umbilical Hernia   WHAT YOU NEED TO KNOW:   What is an umbilical hernia? An umbilical hernia is a bulge through the abdominal muscles in the area of the navel (belly button)  The hernia may contain tissue from the abdomen, part of an organ (such as the intestine), or fluid  What increases my risk for an umbilical hernia? Umbilical hernias usually happen because of a hole or a weak area in the muscles of the abdominal wall  This can happen at any age  Umbilical hernias happen more often in women than in men  You may be more likely to have a hernia if other family members have them  You may also have an increased risk if you have any of the following:  · Overweight     · Ascites, which is fluid in the abdomen     · A large growth in your abdomen     · Pregnancy now or at any time in the past     · A very long labor when delivering your baby  What are the signs and symptoms of an umbilical hernia? · The most common sign is a bulge or swelling in the area of the navel  You may be able to see the lump, or you may feel it when you gently press on your navel  · The bulge may get bigger when you bend, cough, or strain to have a bowel movement  The bulge may get smaller and hurt less when you lie down  · You may have pain or burning in your abdomen  The pain may get worse when you cough, sneeze, lift, or stand for a long time  · The skin over the bulge may be swollen and red, gray, or blue  How is an umbilical hernia diagnosed? Your healthcare provider will usually find the hernia during an exam  Your healthcare provider may check to see if the hernia can be reduced (gently pushed back into the abdomen)  You may need tests, such as x-rays of the abdomen or an ultrasound   These tests will help healthcare providers decide how to treat your hernia  How is an umbilical hernia treated? · Surgery:  Most adults with umbilical hernias will need surgery to fix their hernias  Always tell your healthcare provider if you have new or worse pain in the area of your hernia  You may need emergency surgery if a loop of intestine becomes trapped in the hernia  · Ibuprofen or acetaminophen:  These medicines decrease pain  They are available without a doctor's order  Ask your healthcare provider which medicine is right for you  Ask how much to take and how often to take it  Follow directions  These medicines can cause stomach bleeding if not taken correctly  Ibuprofen can cause kidney damage  Do not take ibuprofen if you have kidney disease, an ulcer, or allergies to aspirin  Acetaminophen can cause liver damage  Do not drink alcohol if you take acetaminophen  What are the risks of an umbilical hernia? If your hernia is not treated, the following serious problems may happen:  · Incarcerated hernia: This happens when your healthcare provider cannot push your hernia back into your abdomen  The tissue becomes stuck or trapped, which can cause serious problems  Umbilical hernias have a high risk of becoming incarcerated  If this happens, your intestines may become blocked  You may have very bad pain in your abdomen  You may also have nausea or vomiting  · Strangulated hernia:  A loop of intestine in the hernia may become pinched or strangulated  This means that the blood supply to that area of intestine is decreased or cut off  If this happens, you may feel very bad pain in your abdomen  Other signs include nausea, vomiting, or a fever  You may have constipation or blood in your bowel movements  If your intestine becomes blocked, you may not be able to pass gas or have a bowel movement  If the hernia is not treated right away, that part of your intestine may die   This is called gangrene, and it can be life-threatening  When should I contact my healthcare provider? · You have nausea or vomiting  · You cannot gently push your hernia back into your abdomen  · You are constipated or have blood in your bowel movements  · Your hernia is getting bigger  · You have questions or concerns about your condition or care  When should I seek immediate care or call 911? · You have a fever  · Your hernia is stuck outside the abdomen and is painful, swollen, or feels hard  · You completely stop having bowel movements and stop passing gas  · Your abdominal pain is bad or getting worse  CARE AGREEMENT:   You have the right to help plan your care  Learn about your health condition and how it may be treated  Discuss treatment options with your caregivers to decide what care you want to receive  You always have the right to refuse treatment  The above information is an  only  It is not intended as medical advice for individual conditions or treatments  Talk to your doctor, nurse or pharmacist before following any medical regimen to see if it is safe and effective for you  © 2017 [employer] Information is for End User's use only and may not be sold, redistributed or otherwise used for commercial purposes  All illustrations and images included in CareNotes® are the copyrighted property of [employer]  or [employer]

## 2019-03-13 ENCOUNTER — LAB (OUTPATIENT)
Dept: LAB | Facility: CLINIC | Age: 29
End: 2019-03-13
Payer: COMMERCIAL

## 2019-03-13 DIAGNOSIS — Z3A.28 28 WEEKS GESTATION OF PREGNANCY: ICD-10-CM

## 2019-03-13 DIAGNOSIS — O16.3 HYPERTENSION AFFECTING PREGNANCY IN THIRD TRIMESTER: ICD-10-CM

## 2019-03-13 LAB
ALBUMIN SERPL BCP-MCNC: 2.3 G/DL (ref 3.5–5)
ALP SERPL-CCNC: 97 U/L (ref 46–116)
ALT SERPL W P-5'-P-CCNC: 17 U/L (ref 12–78)
ANION GAP SERPL CALCULATED.3IONS-SCNC: 9 MMOL/L (ref 4–13)
AST SERPL W P-5'-P-CCNC: 12 U/L (ref 5–45)
BILIRUB SERPL-MCNC: 0.2 MG/DL (ref 0.2–1)
BUN SERPL-MCNC: 6 MG/DL (ref 5–25)
CALCIUM SERPL-MCNC: 8.6 MG/DL (ref 8.3–10.1)
CHLORIDE SERPL-SCNC: 104 MMOL/L (ref 100–108)
CO2 SERPL-SCNC: 25 MMOL/L (ref 21–32)
CREAT SERPL-MCNC: 0.54 MG/DL (ref 0.6–1.3)
CREAT UR-MCNC: 197 MG/DL
ERYTHROCYTE [DISTWIDTH] IN BLOOD BY AUTOMATED COUNT: 13.9 % (ref 11.6–15.1)
GFR SERPL CREATININE-BSD FRML MDRD: 129 ML/MIN/1.73SQ M
GLUCOSE P FAST SERPL-MCNC: 85 MG/DL (ref 65–99)
HCT VFR BLD AUTO: 37.9 % (ref 34.8–46.1)
HGB BLD-MCNC: 11.9 G/DL (ref 11.5–15.4)
MCH RBC QN AUTO: 27 PG (ref 26.8–34.3)
MCHC RBC AUTO-ENTMCNC: 31.4 G/DL (ref 31.4–37.4)
MCV RBC AUTO: 86 FL (ref 82–98)
PLATELET # BLD AUTO: 312 THOUSANDS/UL (ref 149–390)
PMV BLD AUTO: 10.2 FL (ref 8.9–12.7)
POTASSIUM SERPL-SCNC: 3.7 MMOL/L (ref 3.5–5.3)
PROT SERPL-MCNC: 6.8 G/DL (ref 6.4–8.2)
PROT UR-MCNC: 17 MG/DL
PROT/CREAT UR: 0.09 MG/G{CREAT} (ref 0–0.1)
RBC # BLD AUTO: 4.4 MILLION/UL (ref 3.81–5.12)
SODIUM SERPL-SCNC: 138 MMOL/L (ref 136–145)
WBC # BLD AUTO: 12.12 THOUSAND/UL (ref 4.31–10.16)

## 2019-03-13 PROCEDURE — 80053 COMPREHEN METABOLIC PANEL: CPT

## 2019-03-13 PROCEDURE — 82570 ASSAY OF URINE CREATININE: CPT

## 2019-03-13 PROCEDURE — 85027 COMPLETE CBC AUTOMATED: CPT

## 2019-03-13 PROCEDURE — 36415 COLL VENOUS BLD VENIPUNCTURE: CPT

## 2019-03-13 PROCEDURE — 84156 ASSAY OF PROTEIN URINE: CPT

## 2019-03-13 NOTE — RESULT ENCOUNTER NOTE
John Shaver, your labs look good and you currently do not have pre-eclampsia  Dr Ale Fay will see you at your appointment tomorrow

## 2019-03-14 ENCOUNTER — ROUTINE PRENATAL (OUTPATIENT)
Dept: OBGYN CLINIC | Facility: CLINIC | Age: 29
End: 2019-03-14

## 2019-03-14 VITALS — DIASTOLIC BLOOD PRESSURE: 86 MMHG | WEIGHT: 269 LBS | BODY MASS INDEX: 52.54 KG/M2 | SYSTOLIC BLOOD PRESSURE: 148 MMHG

## 2019-03-14 DIAGNOSIS — Z3A.28 28 WEEKS GESTATION OF PREGNANCY: ICD-10-CM

## 2019-03-14 DIAGNOSIS — I10 HTN (HYPERTENSION), BENIGN: ICD-10-CM

## 2019-03-14 DIAGNOSIS — K42.9 UMBILICAL HERNIA WITHOUT OBSTRUCTION AND WITHOUT GANGRENE: Primary | ICD-10-CM

## 2019-03-14 PROCEDURE — PNV: Performed by: OBSTETRICS & GYNECOLOGY

## 2019-03-14 NOTE — PROGRESS NOTES
Pt is here for a blood pressure check  Pt had her 28w visit earlier this week and her Tdap vaccine  Pt is still having abdominal pain from her hernia  She has been checking her blood pressures at home and states that they are still elevated

## 2019-03-14 NOTE — PROGRESS NOTES
Pincus Mcardle is a 29y o  year old  at 34w4d for routine prenatal visit  BP: 148/86 TWlb  [facility] reviewed  chtn- had elevated disastolic pressures prior to 20 weeks  Pre-e labs performed yesterday and normal  Goal is to keep bp < 150/90  Will start antihypertensive therapy and repeat labs if elevated past this  Will need APFS @ 32 weeks, and delivery  Between 38 - 39 6/7 weeks  No headaches or RUQ pain  Continue LDASA  Umbilical hernia- images reviewed with Dr Fauzia Coates  2-3cm base  Reducible  Referral to general surgery given, but discussed that unless incarcerated bowel, procedure to repair will be done after patient has delivered  Patient states that she is starting to get nauseous because of the hernia  Advised that if she is vomitting or in pain, she should go to ER  Patient taken out of work today  Class 3 obesity- discussed with patient  Weight gain discussed

## 2019-03-25 DIAGNOSIS — B00.9 HSV INFECTION: ICD-10-CM

## 2019-03-25 DIAGNOSIS — R12 HEART BURN: ICD-10-CM

## 2019-03-26 RX ORDER — VALACYCLOVIR HYDROCHLORIDE 500 MG/1
500 TABLET, FILM COATED ORAL 2 TIMES DAILY
Qty: 180 TABLET | Refills: 0 | Status: SHIPPED | OUTPATIENT
Start: 2019-03-26 | End: 2019-04-27 | Stop reason: SDUPTHER

## 2019-03-26 RX ORDER — PANTOPRAZOLE SODIUM 20 MG/1
20 TABLET, DELAYED RELEASE ORAL DAILY
Qty: 90 TABLET | Refills: 0 | Status: SHIPPED | OUTPATIENT
Start: 2019-03-26 | End: 2019-05-13 | Stop reason: SDUPTHER

## 2019-03-29 ENCOUNTER — ROUTINE PRENATAL (OUTPATIENT)
Dept: OBGYN CLINIC | Facility: CLINIC | Age: 29
End: 2019-03-29

## 2019-03-29 VITALS — WEIGHT: 271.4 LBS | SYSTOLIC BLOOD PRESSURE: 136 MMHG | BODY MASS INDEX: 53 KG/M2 | DIASTOLIC BLOOD PRESSURE: 86 MMHG

## 2019-03-29 DIAGNOSIS — I10 HTN (HYPERTENSION), BENIGN: ICD-10-CM

## 2019-03-29 DIAGNOSIS — B00.9 HSV INFECTION: ICD-10-CM

## 2019-03-29 DIAGNOSIS — Z3A.30 30 WEEKS GESTATION OF PREGNANCY: ICD-10-CM

## 2019-03-29 PROCEDURE — PNV: Performed by: OBSTETRICS & GYNECOLOGY

## 2019-03-29 NOTE — PROGRESS NOTES
Regan Hendrix is a 34y o  year old  at 32w2d for routine prenatal visit  BP: 136/86 TW  [facility] reviewed  No current complaints  chtn- has follow up ultrasound in 2 weeks  apfs beginning 32 weeks  Umbilical hernia- saw Dr Bladimir Haque  Will follow up at 8 weeks  I have discussed with patient today that she can go back to work with restrictions  If bp elevates, will take out of work indefinitely  Does not need to be out of work for [employer]  hernia at this point

## 2019-03-29 NOTE — PROGRESS NOTES
C/o back pain and pressure from time to time  Has paperwork for work   Partner recently had outbreak of blisters--unsure of what it is (moluscum vs  Herpes)

## 2019-04-02 ENCOUNTER — ROUTINE PRENATAL (OUTPATIENT)
Dept: OBGYN CLINIC | Facility: CLINIC | Age: 29
End: 2019-04-02

## 2019-04-02 VITALS — WEIGHT: 269 LBS | SYSTOLIC BLOOD PRESSURE: 142 MMHG | DIASTOLIC BLOOD PRESSURE: 90 MMHG | BODY MASS INDEX: 52.54 KG/M2

## 2019-04-02 DIAGNOSIS — Z3A.31 31 WEEKS GESTATION OF PREGNANCY: ICD-10-CM

## 2019-04-02 DIAGNOSIS — I10 HTN (HYPERTENSION), BENIGN: ICD-10-CM

## 2019-04-02 PROCEDURE — PNV: Performed by: OBSTETRICS & GYNECOLOGY

## 2019-04-08 ENCOUNTER — ROUTINE PRENATAL (OUTPATIENT)
Dept: OBGYN CLINIC | Facility: CLINIC | Age: 29
End: 2019-04-08
Payer: COMMERCIAL

## 2019-04-08 VITALS — DIASTOLIC BLOOD PRESSURE: 78 MMHG | WEIGHT: 273 LBS | SYSTOLIC BLOOD PRESSURE: 134 MMHG | BODY MASS INDEX: 53.32 KG/M2

## 2019-04-08 DIAGNOSIS — Z3A.31 31 WEEKS GESTATION OF PREGNANCY: ICD-10-CM

## 2019-04-08 DIAGNOSIS — O09.92 HIGH-RISK PREGNANCY IN SECOND TRIMESTER: Primary | ICD-10-CM

## 2019-04-08 DIAGNOSIS — I10 HTN (HYPERTENSION), BENIGN: ICD-10-CM

## 2019-04-08 PROCEDURE — PNV: Performed by: OBSTETRICS & GYNECOLOGY

## 2019-04-08 PROCEDURE — 59025 FETAL NON-STRESS TEST: CPT | Performed by: OBSTETRICS & GYNECOLOGY

## 2019-04-11 ENCOUNTER — ULTRASOUND (OUTPATIENT)
Dept: PERINATAL CARE | Facility: CLINIC | Age: 29
End: 2019-04-11
Payer: COMMERCIAL

## 2019-04-11 VITALS
HEIGHT: 60 IN | DIASTOLIC BLOOD PRESSURE: 71 MMHG | SYSTOLIC BLOOD PRESSURE: 131 MMHG | HEART RATE: 89 BPM | WEIGHT: 273.6 LBS | BODY MASS INDEX: 53.71 KG/M2

## 2019-04-11 DIAGNOSIS — O10.913 CHRONIC HYPERTENSION IN OBSTETRIC CONTEXT IN THIRD TRIMESTER: ICD-10-CM

## 2019-04-11 DIAGNOSIS — Z3A.32 32 WEEKS GESTATION OF PREGNANCY: ICD-10-CM

## 2019-04-11 DIAGNOSIS — O99.210 OBESITY AFFECTING PREGNANCY, ANTEPARTUM: ICD-10-CM

## 2019-04-11 PROCEDURE — 99212 OFFICE O/P EST SF 10 MIN: CPT | Performed by: OBSTETRICS & GYNECOLOGY

## 2019-04-11 PROCEDURE — 76819 FETAL BIOPHYS PROFIL W/O NST: CPT | Performed by: OBSTETRICS & GYNECOLOGY

## 2019-04-11 PROCEDURE — 76816 OB US FOLLOW-UP PER FETUS: CPT | Performed by: OBSTETRICS & GYNECOLOGY

## 2019-04-16 ENCOUNTER — ROUTINE PRENATAL (OUTPATIENT)
Dept: OBGYN CLINIC | Facility: CLINIC | Age: 29
End: 2019-04-16
Payer: COMMERCIAL

## 2019-04-16 VITALS — DIASTOLIC BLOOD PRESSURE: 78 MMHG | SYSTOLIC BLOOD PRESSURE: 122 MMHG | WEIGHT: 274.2 LBS | BODY MASS INDEX: 53.55 KG/M2

## 2019-04-16 DIAGNOSIS — Z3A.33 33 WEEKS GESTATION OF PREGNANCY: ICD-10-CM

## 2019-04-16 DIAGNOSIS — O10.913 CHRONIC HYPERTENSION IN OBSTETRIC CONTEXT IN THIRD TRIMESTER: Primary | ICD-10-CM

## 2019-04-16 DIAGNOSIS — O99.210 OBESITY AFFECTING PREGNANCY, ANTEPARTUM: ICD-10-CM

## 2019-04-16 PROCEDURE — 59025 FETAL NON-STRESS TEST: CPT | Performed by: OBSTETRICS & GYNECOLOGY

## 2019-04-16 PROCEDURE — PNV: Performed by: OBSTETRICS & GYNECOLOGY

## 2019-04-19 ENCOUNTER — ULTRASOUND (OUTPATIENT)
Dept: PERINATAL CARE | Facility: CLINIC | Age: 29
End: 2019-04-19
Payer: COMMERCIAL

## 2019-04-19 VITALS
WEIGHT: 274.6 LBS | HEIGHT: 60 IN | BODY MASS INDEX: 53.91 KG/M2 | DIASTOLIC BLOOD PRESSURE: 87 MMHG | SYSTOLIC BLOOD PRESSURE: 128 MMHG | HEART RATE: 111 BPM

## 2019-04-19 DIAGNOSIS — Z3A.33 33 WEEKS GESTATION OF PREGNANCY: ICD-10-CM

## 2019-04-19 DIAGNOSIS — O10.913 CHRONIC HYPERTENSION IN OBSTETRIC CONTEXT IN THIRD TRIMESTER: Primary | ICD-10-CM

## 2019-04-19 DIAGNOSIS — O99.210 OBESITY AFFECTING PREGNANCY, ANTEPARTUM: ICD-10-CM

## 2019-04-19 PROBLEM — O99.213 OBESITY AFFECTING PREGNANCY IN THIRD TRIMESTER: Status: ACTIVE | Noted: 2019-04-19

## 2019-04-19 PROCEDURE — 59025 FETAL NON-STRESS TEST: CPT | Performed by: OBSTETRICS & GYNECOLOGY

## 2019-04-19 PROCEDURE — 76815 OB US LIMITED FETUS(S): CPT | Performed by: OBSTETRICS & GYNECOLOGY

## 2019-04-23 ENCOUNTER — ROUTINE PRENATAL (OUTPATIENT)
Dept: OBGYN CLINIC | Facility: CLINIC | Age: 29
End: 2019-04-23
Payer: COMMERCIAL

## 2019-04-23 VITALS — DIASTOLIC BLOOD PRESSURE: 76 MMHG | SYSTOLIC BLOOD PRESSURE: 118 MMHG | WEIGHT: 275 LBS | BODY MASS INDEX: 53.71 KG/M2

## 2019-04-23 DIAGNOSIS — O10.913 CHRONIC HYPERTENSION IN OBSTETRIC CONTEXT IN THIRD TRIMESTER: Primary | ICD-10-CM

## 2019-04-23 DIAGNOSIS — Z3A.33 33 WEEKS GESTATION OF PREGNANCY: ICD-10-CM

## 2019-04-23 DIAGNOSIS — I10 HTN (HYPERTENSION), BENIGN: ICD-10-CM

## 2019-04-23 PROBLEM — O99.210 OBESITY AFFECTING PREGNANCY, ANTEPARTUM: Status: RESOLVED | Noted: 2019-01-24 | Resolved: 2019-04-23

## 2019-04-23 PROBLEM — E66.09 OBESITY DUE TO EXCESS CALORIES WITHOUT SERIOUS COMORBIDITY: Status: RESOLVED | Noted: 2017-06-01 | Resolved: 2019-04-23

## 2019-04-23 PROBLEM — Z86.2 HX OF LEUKOCYTOSIS: Status: RESOLVED | Noted: 2017-04-28 | Resolved: 2019-04-23

## 2019-04-23 PROCEDURE — 59025 FETAL NON-STRESS TEST: CPT | Performed by: OBSTETRICS & GYNECOLOGY

## 2019-04-23 PROCEDURE — PNV: Performed by: OBSTETRICS & GYNECOLOGY

## 2019-04-26 ENCOUNTER — ULTRASOUND (OUTPATIENT)
Dept: PERINATAL CARE | Facility: CLINIC | Age: 29
End: 2019-04-26
Payer: COMMERCIAL

## 2019-04-26 VITALS
DIASTOLIC BLOOD PRESSURE: 76 MMHG | SYSTOLIC BLOOD PRESSURE: 122 MMHG | HEIGHT: 60 IN | BODY MASS INDEX: 54.19 KG/M2 | HEART RATE: 102 BPM | WEIGHT: 276 LBS

## 2019-04-26 DIAGNOSIS — O10.913 CHRONIC HYPERTENSION IN OBSTETRIC CONTEXT IN THIRD TRIMESTER: Primary | ICD-10-CM

## 2019-04-26 DIAGNOSIS — Z3A.34 34 WEEKS GESTATION OF PREGNANCY: ICD-10-CM

## 2019-04-26 DIAGNOSIS — I10 HTN (HYPERTENSION), BENIGN: ICD-10-CM

## 2019-04-26 DIAGNOSIS — B36.9 FUNGAL INFECTION OF SKIN OF ABDOMEN: ICD-10-CM

## 2019-04-26 PROCEDURE — 99212 OFFICE O/P EST SF 10 MIN: CPT | Performed by: OBSTETRICS & GYNECOLOGY

## 2019-04-26 PROCEDURE — 76815 OB US LIMITED FETUS(S): CPT | Performed by: OBSTETRICS & GYNECOLOGY

## 2019-04-26 PROCEDURE — 59025 FETAL NON-STRESS TEST: CPT | Performed by: OBSTETRICS & GYNECOLOGY

## 2019-04-26 RX ORDER — NYSTATIN 100000 [USP'U]/G
POWDER TOPICAL 3 TIMES DAILY
Qty: 30 G | Refills: 1 | Status: SHIPPED | OUTPATIENT
Start: 2019-04-26 | End: 2019-06-10

## 2019-04-27 DIAGNOSIS — B00.9 HSV INFECTION: ICD-10-CM

## 2019-04-29 RX ORDER — VALACYCLOVIR HYDROCHLORIDE 500 MG/1
500 TABLET, FILM COATED ORAL 2 TIMES DAILY
Qty: 180 TABLET | Refills: 0 | Status: SHIPPED | OUTPATIENT
Start: 2019-04-29 | End: 2019-06-10

## 2019-04-30 ENCOUNTER — ROUTINE PRENATAL (OUTPATIENT)
Dept: OBGYN CLINIC | Facility: CLINIC | Age: 29
End: 2019-04-30
Payer: COMMERCIAL

## 2019-04-30 VITALS — SYSTOLIC BLOOD PRESSURE: 126 MMHG | BODY MASS INDEX: 54.33 KG/M2 | DIASTOLIC BLOOD PRESSURE: 84 MMHG | WEIGHT: 278.2 LBS

## 2019-04-30 DIAGNOSIS — O09.93 HIGH-RISK PREGNANCY IN THIRD TRIMESTER: ICD-10-CM

## 2019-04-30 DIAGNOSIS — Z3A.35 35 WEEKS GESTATION OF PREGNANCY: Primary | ICD-10-CM

## 2019-04-30 DIAGNOSIS — O99.213 OBESITY AFFECTING PREGNANCY IN THIRD TRIMESTER: ICD-10-CM

## 2019-04-30 DIAGNOSIS — O10.913 CHRONIC HYPERTENSION IN OBSTETRIC CONTEXT IN THIRD TRIMESTER: ICD-10-CM

## 2019-04-30 PROCEDURE — 59025 FETAL NON-STRESS TEST: CPT | Performed by: OBSTETRICS & GYNECOLOGY

## 2019-04-30 PROCEDURE — PNV: Performed by: OBSTETRICS & GYNECOLOGY

## 2019-05-03 ENCOUNTER — ULTRASOUND (OUTPATIENT)
Dept: PERINATAL CARE | Facility: CLINIC | Age: 29
End: 2019-05-03
Payer: COMMERCIAL

## 2019-05-03 VITALS
HEIGHT: 60 IN | WEIGHT: 278.8 LBS | SYSTOLIC BLOOD PRESSURE: 118 MMHG | HEART RATE: 82 BPM | DIASTOLIC BLOOD PRESSURE: 62 MMHG | BODY MASS INDEX: 54.74 KG/M2

## 2019-05-03 DIAGNOSIS — Z3A.35 35 WEEKS GESTATION OF PREGNANCY: Primary | ICD-10-CM

## 2019-05-03 DIAGNOSIS — O10.913 MATERNAL CHRONIC HYPERTENSION, THIRD TRIMESTER: ICD-10-CM

## 2019-05-03 DIAGNOSIS — E66.01 MATERNAL MORBID OBESITY IN THIRD TRIMESTER, ANTEPARTUM (HCC): ICD-10-CM

## 2019-05-03 DIAGNOSIS — O99.213 MATERNAL MORBID OBESITY IN THIRD TRIMESTER, ANTEPARTUM (HCC): ICD-10-CM

## 2019-05-03 PROCEDURE — 59025 FETAL NON-STRESS TEST: CPT | Performed by: OBSTETRICS & GYNECOLOGY

## 2019-05-03 PROCEDURE — 76815 OB US LIMITED FETUS(S): CPT | Performed by: OBSTETRICS & GYNECOLOGY

## 2019-05-06 ENCOUNTER — ROUTINE PRENATAL (OUTPATIENT)
Dept: OBGYN CLINIC | Facility: CLINIC | Age: 29
End: 2019-05-06

## 2019-05-06 VITALS — WEIGHT: 279.6 LBS | BODY MASS INDEX: 54.61 KG/M2 | SYSTOLIC BLOOD PRESSURE: 126 MMHG | DIASTOLIC BLOOD PRESSURE: 84 MMHG

## 2019-05-06 DIAGNOSIS — Z3A.35 35 WEEKS GESTATION OF PREGNANCY: ICD-10-CM

## 2019-05-06 DIAGNOSIS — O99.213 OBESITY AFFECTING PREGNANCY IN THIRD TRIMESTER: ICD-10-CM

## 2019-05-06 DIAGNOSIS — Z34.03 ENCOUNTER FOR SUPERVISION OF NORMAL FIRST PREGNANCY IN THIRD TRIMESTER: Primary | ICD-10-CM

## 2019-05-06 DIAGNOSIS — O10.913 CHRONIC HYPERTENSION IN OBSTETRIC CONTEXT IN THIRD TRIMESTER: ICD-10-CM

## 2019-05-06 PROCEDURE — 87184 SC STD DISK METHOD PER PLATE: CPT | Performed by: OBSTETRICS & GYNECOLOGY

## 2019-05-06 PROCEDURE — 87653 STREP B DNA AMP PROBE: CPT | Performed by: OBSTETRICS & GYNECOLOGY

## 2019-05-06 PROCEDURE — PNV: Performed by: OBSTETRICS & GYNECOLOGY

## 2019-05-07 ENCOUNTER — TELEPHONE (OUTPATIENT)
Dept: OBGYN CLINIC | Facility: CLINIC | Age: 29
End: 2019-05-07

## 2019-05-08 ENCOUNTER — ULTRASOUND (OUTPATIENT)
Dept: PERINATAL CARE | Facility: CLINIC | Age: 29
End: 2019-05-08
Payer: COMMERCIAL

## 2019-05-08 VITALS
BODY MASS INDEX: 55.56 KG/M2 | SYSTOLIC BLOOD PRESSURE: 129 MMHG | HEART RATE: 109 BPM | HEIGHT: 60 IN | DIASTOLIC BLOOD PRESSURE: 83 MMHG | WEIGHT: 283 LBS

## 2019-05-08 DIAGNOSIS — O10.913 CHRONIC HYPERTENSION IN OBSTETRIC CONTEXT IN THIRD TRIMESTER: Primary | ICD-10-CM

## 2019-05-08 DIAGNOSIS — B00.9 HSV INFECTION: ICD-10-CM

## 2019-05-08 DIAGNOSIS — O99.213 MATERNAL MORBID OBESITY IN THIRD TRIMESTER, ANTEPARTUM (HCC): ICD-10-CM

## 2019-05-08 DIAGNOSIS — E66.01 MATERNAL MORBID OBESITY IN THIRD TRIMESTER, ANTEPARTUM (HCC): ICD-10-CM

## 2019-05-08 DIAGNOSIS — Z3A.36 36 WEEKS GESTATION OF PREGNANCY: ICD-10-CM

## 2019-05-08 PROCEDURE — 76816 OB US FOLLOW-UP PER FETUS: CPT | Performed by: OBSTETRICS & GYNECOLOGY

## 2019-05-08 PROCEDURE — 99212 OFFICE O/P EST SF 10 MIN: CPT | Performed by: OBSTETRICS & GYNECOLOGY

## 2019-05-08 PROCEDURE — 59025 FETAL NON-STRESS TEST: CPT | Performed by: OBSTETRICS & GYNECOLOGY

## 2019-05-11 LAB
GP B STREP DNA SPEC QL NAA+PROBE: ABNORMAL
GP B STREP DNA SPEC QL NAA+PROBE: ABNORMAL

## 2019-05-13 ENCOUNTER — ROUTINE PRENATAL (OUTPATIENT)
Dept: OBGYN CLINIC | Facility: CLINIC | Age: 29
End: 2019-05-13
Payer: COMMERCIAL

## 2019-05-13 VITALS — BODY MASS INDEX: 55.27 KG/M2 | DIASTOLIC BLOOD PRESSURE: 74 MMHG | WEIGHT: 283 LBS | SYSTOLIC BLOOD PRESSURE: 136 MMHG

## 2019-05-13 DIAGNOSIS — R12 HEART BURN: ICD-10-CM

## 2019-05-13 DIAGNOSIS — Z34.03 ENCOUNTER FOR SUPERVISION OF NORMAL FIRST PREGNANCY IN THIRD TRIMESTER: Primary | ICD-10-CM

## 2019-05-13 DIAGNOSIS — Z3A.36 36 WEEKS GESTATION OF PREGNANCY: ICD-10-CM

## 2019-05-13 DIAGNOSIS — O99.213 MATERNAL MORBID OBESITY IN THIRD TRIMESTER, ANTEPARTUM (HCC): ICD-10-CM

## 2019-05-13 DIAGNOSIS — E66.01 MATERNAL MORBID OBESITY IN THIRD TRIMESTER, ANTEPARTUM (HCC): ICD-10-CM

## 2019-05-13 DIAGNOSIS — O10.913 CHRONIC HYPERTENSION IN OBSTETRIC CONTEXT IN THIRD TRIMESTER: ICD-10-CM

## 2019-05-13 PROCEDURE — PNV: Performed by: OBSTETRICS & GYNECOLOGY

## 2019-05-13 PROCEDURE — 59025 FETAL NON-STRESS TEST: CPT | Performed by: OBSTETRICS & GYNECOLOGY

## 2019-05-13 RX ORDER — PANTOPRAZOLE SODIUM 20 MG/1
20 TABLET, DELAYED RELEASE ORAL DAILY
Qty: 90 TABLET | Refills: 0 | Status: SHIPPED | OUTPATIENT
Start: 2019-05-13 | End: 2019-06-07 | Stop reason: ALTCHOICE

## 2019-05-16 ENCOUNTER — ULTRASOUND (OUTPATIENT)
Dept: PERINATAL CARE | Facility: CLINIC | Age: 29
End: 2019-05-16
Payer: COMMERCIAL

## 2019-05-16 VITALS
BODY MASS INDEX: 56.07 KG/M2 | HEIGHT: 60 IN | SYSTOLIC BLOOD PRESSURE: 128 MMHG | WEIGHT: 285.6 LBS | DIASTOLIC BLOOD PRESSURE: 66 MMHG | HEART RATE: 94 BPM

## 2019-05-16 DIAGNOSIS — Z3A.36 36 WEEKS GESTATION OF PREGNANCY: ICD-10-CM

## 2019-05-16 DIAGNOSIS — O10.913 CHRONIC HYPERTENSION IN OBSTETRIC CONTEXT IN THIRD TRIMESTER: Primary | ICD-10-CM

## 2019-05-16 DIAGNOSIS — E66.01 MATERNAL MORBID OBESITY IN THIRD TRIMESTER, ANTEPARTUM (HCC): ICD-10-CM

## 2019-05-16 DIAGNOSIS — O99.213 MATERNAL MORBID OBESITY IN THIRD TRIMESTER, ANTEPARTUM (HCC): ICD-10-CM

## 2019-05-16 PROCEDURE — 76815 OB US LIMITED FETUS(S): CPT | Performed by: OBSTETRICS & GYNECOLOGY

## 2019-05-16 PROCEDURE — 59025 FETAL NON-STRESS TEST: CPT | Performed by: OBSTETRICS & GYNECOLOGY

## 2019-05-20 ENCOUNTER — ROUTINE PRENATAL (OUTPATIENT)
Dept: OBGYN CLINIC | Facility: CLINIC | Age: 29
End: 2019-05-20
Payer: COMMERCIAL

## 2019-05-20 VITALS — DIASTOLIC BLOOD PRESSURE: 86 MMHG | WEIGHT: 285.4 LBS | SYSTOLIC BLOOD PRESSURE: 132 MMHG | BODY MASS INDEX: 55.74 KG/M2

## 2019-05-20 DIAGNOSIS — E66.01 MATERNAL MORBID OBESITY IN THIRD TRIMESTER, ANTEPARTUM (HCC): ICD-10-CM

## 2019-05-20 DIAGNOSIS — B00.9 HSV INFECTION: ICD-10-CM

## 2019-05-20 DIAGNOSIS — O10.913 CHRONIC HYPERTENSION IN OBSTETRIC CONTEXT IN THIRD TRIMESTER: ICD-10-CM

## 2019-05-20 DIAGNOSIS — O99.213 OBESITY AFFECTING PREGNANCY IN THIRD TRIMESTER: ICD-10-CM

## 2019-05-20 DIAGNOSIS — O99.213 MATERNAL MORBID OBESITY IN THIRD TRIMESTER, ANTEPARTUM (HCC): ICD-10-CM

## 2019-05-20 DIAGNOSIS — Z3A.37 37 WEEKS GESTATION OF PREGNANCY: Primary | ICD-10-CM

## 2019-05-20 PROCEDURE — PNV: Performed by: OBSTETRICS & GYNECOLOGY

## 2019-05-20 PROCEDURE — 59025 FETAL NON-STRESS TEST: CPT | Performed by: OBSTETRICS & GYNECOLOGY

## 2019-05-21 ENCOUNTER — HOSPITAL ENCOUNTER (OUTPATIENT)
Dept: LABOR AND DELIVERY | Facility: HOSPITAL | Age: 29
Discharge: HOME | End: 2019-05-21
Payer: COMMERCIAL

## 2019-05-21 ENCOUNTER — HOSPITAL ENCOUNTER (INPATIENT)
Facility: HOSPITAL | Age: 29
LOS: 4 days | Discharge: HOME | End: 2019-05-25
Attending: OBSTETRICS & GYNECOLOGY | Admitting: OBSTETRICS & GYNECOLOGY
Payer: COMMERCIAL

## 2019-05-21 DIAGNOSIS — Z3A.38 38 WEEKS GESTATION OF PREGNANCY: ICD-10-CM

## 2019-05-21 LAB
ABO GROUP BLD: NORMAL
ALBUMIN SERPL BCP-MCNC: 2.2 G/DL (ref 3.5–5)
ALP SERPL-CCNC: 195 U/L (ref 46–116)
ALT SERPL W P-5'-P-CCNC: 14 U/L (ref 12–78)
ANION GAP SERPL CALCULATED.3IONS-SCNC: 8 MMOL/L (ref 4–13)
AST SERPL W P-5'-P-CCNC: 9 U/L (ref 5–45)
BILIRUB SERPL-MCNC: 0.18 MG/DL (ref 0.2–1)
BLD GP AB SCN SERPL QL: NEGATIVE
BUN SERPL-MCNC: 10 MG/DL (ref 5–25)
CALCIUM SERPL-MCNC: 8.5 MG/DL (ref 8.3–10.1)
CHLORIDE SERPL-SCNC: 108 MMOL/L (ref 100–108)
CO2 SERPL-SCNC: 22 MMOL/L (ref 21–32)
CREAT SERPL-MCNC: 0.66 MG/DL (ref 0.6–1.3)
CREAT UR-MCNC: 113 MG/DL
ERYTHROCYTE [DISTWIDTH] IN BLOOD BY AUTOMATED COUNT: 15.7 % (ref 11.6–15.1)
GFR SERPL CREATININE-BSD FRML MDRD: 120 ML/MIN/1.73SQ M
GLUCOSE SERPL-MCNC: 107 MG/DL (ref 65–140)
HCT VFR BLD AUTO: 34.2 % (ref 34.8–46.1)
HGB BLD-MCNC: 10.8 G/DL (ref 11.5–15.4)
MCH RBC QN AUTO: 26.3 PG (ref 26.8–34.3)
MCHC RBC AUTO-ENTMCNC: 31.6 G/DL (ref 31.4–37.4)
MCV RBC AUTO: 83 FL (ref 82–98)
PLATELET # BLD AUTO: 332 THOUSANDS/UL (ref 149–390)
PMV BLD AUTO: 11 FL (ref 8.9–12.7)
POTASSIUM SERPL-SCNC: 3.7 MMOL/L (ref 3.5–5.3)
PROT SERPL-MCNC: 6.7 G/DL (ref 6.4–8.2)
PROT UR-MCNC: 11 MG/DL
PROT/CREAT UR: 0.1 MG/G{CREAT} (ref 0–0.1)
RBC # BLD AUTO: 4.11 MILLION/UL (ref 3.81–5.12)
RH BLD: POSITIVE
SODIUM SERPL-SCNC: 138 MMOL/L (ref 136–145)
SPECIMEN EXPIRATION DATE: NORMAL
WBC # BLD AUTO: 14.53 THOUSAND/UL (ref 4.31–10.16)

## 2019-05-21 PROCEDURE — 86850 RBC ANTIBODY SCREEN: CPT | Performed by: STUDENT IN AN ORGANIZED HEALTH CARE EDUCATION/TRAINING PROGRAM

## 2019-05-21 PROCEDURE — 80053 COMPREHEN METABOLIC PANEL: CPT | Performed by: STUDENT IN AN ORGANIZED HEALTH CARE EDUCATION/TRAINING PROGRAM

## 2019-05-21 PROCEDURE — 86592 SYPHILIS TEST NON-TREP QUAL: CPT | Performed by: STUDENT IN AN ORGANIZED HEALTH CARE EDUCATION/TRAINING PROGRAM

## 2019-05-21 PROCEDURE — 82570 ASSAY OF URINE CREATININE: CPT | Performed by: STUDENT IN AN ORGANIZED HEALTH CARE EDUCATION/TRAINING PROGRAM

## 2019-05-21 PROCEDURE — 84156 ASSAY OF PROTEIN URINE: CPT | Performed by: STUDENT IN AN ORGANIZED HEALTH CARE EDUCATION/TRAINING PROGRAM

## 2019-05-21 PROCEDURE — NC001 PR NO CHARGE: Performed by: STUDENT IN AN ORGANIZED HEALTH CARE EDUCATION/TRAINING PROGRAM

## 2019-05-21 PROCEDURE — 85027 COMPLETE CBC AUTOMATED: CPT | Performed by: STUDENT IN AN ORGANIZED HEALTH CARE EDUCATION/TRAINING PROGRAM

## 2019-05-21 PROCEDURE — 86901 BLOOD TYPING SEROLOGIC RH(D): CPT | Performed by: STUDENT IN AN ORGANIZED HEALTH CARE EDUCATION/TRAINING PROGRAM

## 2019-05-21 PROCEDURE — 86900 BLOOD TYPING SEROLOGIC ABO: CPT | Performed by: STUDENT IN AN ORGANIZED HEALTH CARE EDUCATION/TRAINING PROGRAM

## 2019-05-21 RX ORDER — CEFAZOLIN SODIUM 1 G/50ML
1000 SOLUTION INTRAVENOUS EVERY 8 HOURS
Status: DISCONTINUED | OUTPATIENT
Start: 2019-05-22 | End: 2019-05-23

## 2019-05-21 RX ORDER — CEFAZOLIN SODIUM 2 G/50ML
2000 SOLUTION INTRAVENOUS ONCE
Status: COMPLETED | OUTPATIENT
Start: 2019-05-21 | End: 2019-05-21

## 2019-05-21 RX ORDER — ONDANSETRON 2 MG/ML
4 INJECTION INTRAMUSCULAR; INTRAVENOUS EVERY 6 HOURS PRN
Status: DISCONTINUED | OUTPATIENT
Start: 2019-05-21 | End: 2019-05-23

## 2019-05-21 RX ORDER — SODIUM CHLORIDE, SODIUM LACTATE, POTASSIUM CHLORIDE, CALCIUM CHLORIDE 600; 310; 30; 20 MG/100ML; MG/100ML; MG/100ML; MG/100ML
125 INJECTION, SOLUTION INTRAVENOUS CONTINUOUS
Status: DISCONTINUED | OUTPATIENT
Start: 2019-05-21 | End: 2019-05-25 | Stop reason: HOSPADM

## 2019-05-21 RX ADMIN — CEFAZOLIN SODIUM 2000 MG: 2 SOLUTION INTRAVENOUS at 21:23

## 2019-05-21 RX ADMIN — SODIUM CHLORIDE, SODIUM LACTATE, POTASSIUM CHLORIDE, AND CALCIUM CHLORIDE 125 ML/HR: .6; .31; .03; .02 INJECTION, SOLUTION INTRAVENOUS at 21:27

## 2019-05-21 RX ADMIN — MISOPROSTOL 25 MCG: 100 TABLET ORAL at 21:35

## 2019-05-22 LAB — RPR SER QL: NORMAL

## 2019-05-22 PROCEDURE — 3E033VJ INTRODUCTION OF OTHER HORMONE INTO PERIPHERAL VEIN, PERCUTANEOUS APPROACH: ICD-10-PCS | Performed by: OBSTETRICS & GYNECOLOGY

## 2019-05-22 PROCEDURE — 10H07YZ INSERTION OF OTHER DEVICE INTO PRODUCTS OF CONCEPTION, VIA NATURAL OR ARTIFICIAL OPENING: ICD-10-PCS | Performed by: OBSTETRICS & GYNECOLOGY

## 2019-05-22 PROCEDURE — NC001 PR NO CHARGE: Performed by: OBSTETRICS & GYNECOLOGY

## 2019-05-22 PROCEDURE — 3E0P7VZ INTRODUCTION OF HORMONE INTO FEMALE REPRODUCTIVE, VIA NATURAL OR ARTIFICIAL OPENING: ICD-10-PCS | Performed by: OBSTETRICS & GYNECOLOGY

## 2019-05-22 RX ORDER — BUTORPHANOL TARTRATE 1 MG/ML
1 INJECTION, SOLUTION INTRAMUSCULAR; INTRAVENOUS
Status: DISCONTINUED | OUTPATIENT
Start: 2019-05-22 | End: 2019-05-23

## 2019-05-22 RX ORDER — PANTOPRAZOLE SODIUM 20 MG/1
20 TABLET, DELAYED RELEASE ORAL
Status: DISCONTINUED | OUTPATIENT
Start: 2019-05-23 | End: 2019-05-23

## 2019-05-22 RX ORDER — VALACYCLOVIR HYDROCHLORIDE 500 MG/1
500 TABLET, FILM COATED ORAL EVERY 12 HOURS SCHEDULED
Status: DISCONTINUED | OUTPATIENT
Start: 2019-05-23 | End: 2019-05-23

## 2019-05-22 RX ORDER — ACETAMINOPHEN 325 MG/1
650 TABLET ORAL EVERY 6 HOURS PRN
Status: DISCONTINUED | OUTPATIENT
Start: 2019-05-22 | End: 2019-05-23

## 2019-05-22 RX ORDER — OXYTOCIN/RINGER'S LACTATE 30/500 ML
PLASTIC BAG, INJECTION (ML) INTRAVENOUS
Status: COMPLETED
Start: 2019-05-22 | End: 2019-05-22

## 2019-05-22 RX ORDER — OXYTOCIN/RINGER'S LACTATE 30/500 ML
1-30 PLASTIC BAG, INJECTION (ML) INTRAVENOUS
Status: DISCONTINUED | OUTPATIENT
Start: 2019-05-22 | End: 2019-05-23

## 2019-05-22 RX ADMIN — CEFAZOLIN SODIUM 1000 MG: 1 SOLUTION INTRAVENOUS at 21:29

## 2019-05-22 RX ADMIN — BUTORPHANOL TARTRATE 1 MG: 1 INJECTION, SOLUTION INTRAMUSCULAR; INTRAVENOUS at 17:54

## 2019-05-22 RX ADMIN — SODIUM CHLORIDE, SODIUM LACTATE, POTASSIUM CHLORIDE, AND CALCIUM CHLORIDE 125 ML/HR: .6; .31; .03; .02 INJECTION, SOLUTION INTRAVENOUS at 20:49

## 2019-05-22 RX ADMIN — Medication 2 MILLI-UNITS/MIN: at 09:18

## 2019-05-22 RX ADMIN — MISOPROSTOL 25 MCG: 100 TABLET ORAL at 01:37

## 2019-05-22 RX ADMIN — ONDANSETRON 4 MG: 2 INJECTION INTRAMUSCULAR; INTRAVENOUS at 20:53

## 2019-05-22 RX ADMIN — CEFAZOLIN SODIUM 1000 MG: 1 SOLUTION INTRAVENOUS at 13:15

## 2019-05-22 RX ADMIN — SODIUM CHLORIDE, SODIUM LACTATE, POTASSIUM CHLORIDE, AND CALCIUM CHLORIDE 125 ML/HR: .6; .31; .03; .02 INJECTION, SOLUTION INTRAVENOUS at 04:55

## 2019-05-22 RX ADMIN — ACETAMINOPHEN 650 MG: 325 TABLET, FILM COATED ORAL at 14:45

## 2019-05-22 RX ADMIN — ACETAMINOPHEN 650 MG: 325 TABLET, FILM COATED ORAL at 05:45

## 2019-05-22 RX ADMIN — PANTOPRAZOLE SODIUM 20 MG: 20 TABLET, DELAYED RELEASE ORAL at 23:59

## 2019-05-22 RX ADMIN — CEFAZOLIN SODIUM 1000 MG: 1 SOLUTION INTRAVENOUS at 05:24

## 2019-05-22 RX ADMIN — SODIUM CHLORIDE, SODIUM LACTATE, POTASSIUM CHLORIDE, AND CALCIUM CHLORIDE 125 ML/HR: .6; .31; .03; .02 INJECTION, SOLUTION INTRAVENOUS at 11:24

## 2019-05-22 RX ADMIN — VALACYCLOVIR HYDROCHLORIDE 500 MG: 500 TABLET, FILM COATED ORAL at 23:59

## 2019-05-23 ENCOUNTER — ANESTHESIA EVENT (INPATIENT)
Dept: LABOR AND DELIVERY | Facility: HOSPITAL | Age: 29
End: 2019-05-23
Payer: COMMERCIAL

## 2019-05-23 ENCOUNTER — ANESTHESIA (INPATIENT)
Dept: LABOR AND DELIVERY | Facility: HOSPITAL | Age: 29
End: 2019-05-23
Payer: COMMERCIAL

## 2019-05-23 LAB
BASE EXCESS BLDCOA CALC-SCNC: -4 MMOL/L (ref 3–11)
BASE EXCESS BLDCOV CALC-SCNC: -3.4 MMOL/L (ref 1–9)
HCO3 BLDCOA-SCNC: 23.5 MMOL/L (ref 17.3–27.3)
HCO3 BLDCOV-SCNC: 23.8 MMOL/L (ref 12.2–28.6)
OXYHGB MFR BLDCOA: 9.1 %
OXYHGB MFR BLDCOV: 12.1 %
PCO2 BLDCOA: 51.9 MM[HG] (ref 30–60)
PCO2 BLDCOV: 50.9 MM HG (ref 27–43)
PH BLDCOA: 7.27 [PH] (ref 7.23–7.43)
PH BLDCOV: 7.29 [PH] (ref 7.19–7.49)
PO2 BLDCOA: <10 MM HG (ref 5–25)
PO2 BLDCOV: <10 MM HG (ref 15–45)

## 2019-05-23 PROCEDURE — 82805 BLOOD GASES W/O2 SATURATION: CPT | Performed by: OBSTETRICS & GYNECOLOGY

## 2019-05-23 PROCEDURE — 94762 N-INVAS EAR/PLS OXIMTRY CONT: CPT

## 2019-05-23 PROCEDURE — 99024 POSTOP FOLLOW-UP VISIT: CPT | Performed by: OBSTETRICS & GYNECOLOGY

## 2019-05-23 PROCEDURE — 59510 CESAREAN DELIVERY: CPT | Performed by: OBSTETRICS & GYNECOLOGY

## 2019-05-23 RX ORDER — OXYTOCIN/RINGER'S LACTATE 30/500 ML
250 PLASTIC BAG, INJECTION (ML) INTRAVENOUS CONTINUOUS
Status: DISCONTINUED | OUTPATIENT
Start: 2019-05-23 | End: 2019-05-25 | Stop reason: HOSPADM

## 2019-05-23 RX ORDER — PANTOPRAZOLE SODIUM 20 MG/1
20 TABLET, DELAYED RELEASE ORAL
Status: DISCONTINUED | OUTPATIENT
Start: 2019-05-24 | End: 2019-05-25 | Stop reason: HOSPADM

## 2019-05-23 RX ORDER — OXYCODONE HYDROCHLORIDE AND ACETAMINOPHEN 5; 325 MG/1; MG/1
2 TABLET ORAL EVERY 4 HOURS PRN
Status: DISCONTINUED | OUTPATIENT
Start: 2019-05-23 | End: 2019-05-25

## 2019-05-23 RX ORDER — DIPHENHYDRAMINE HYDROCHLORIDE 50 MG/ML
25 INJECTION INTRAMUSCULAR; INTRAVENOUS EVERY 6 HOURS PRN
Status: ACTIVE | OUTPATIENT
Start: 2019-05-23 | End: 2019-05-24

## 2019-05-23 RX ORDER — SODIUM CHLORIDE, SODIUM LACTATE, POTASSIUM CHLORIDE, CALCIUM CHLORIDE 600; 310; 30; 20 MG/100ML; MG/100ML; MG/100ML; MG/100ML
INJECTION, SOLUTION INTRAVENOUS CONTINUOUS PRN
Status: DISCONTINUED | OUTPATIENT
Start: 2019-05-23 | End: 2019-05-23 | Stop reason: SURG

## 2019-05-23 RX ORDER — METOCLOPRAMIDE HYDROCHLORIDE 5 MG/ML
INJECTION INTRAMUSCULAR; INTRAVENOUS AS NEEDED
Status: DISCONTINUED | OUTPATIENT
Start: 2019-05-23 | End: 2019-05-23 | Stop reason: SURG

## 2019-05-23 RX ORDER — DIPHENHYDRAMINE HCL 25 MG
25 TABLET ORAL EVERY 6 HOURS PRN
Status: DISCONTINUED | OUTPATIENT
Start: 2019-05-23 | End: 2019-05-25 | Stop reason: HOSPADM

## 2019-05-23 RX ORDER — ONDANSETRON 2 MG/ML
4 INJECTION INTRAMUSCULAR; INTRAVENOUS EVERY 4 HOURS PRN
Status: DISCONTINUED | OUTPATIENT
Start: 2019-05-23 | End: 2019-05-24

## 2019-05-23 RX ORDER — OXYTOCIN/RINGER'S LACTATE 30/500 ML
62.5 PLASTIC BAG, INJECTION (ML) INTRAVENOUS CONTINUOUS
Status: DISPENSED | OUTPATIENT
Start: 2019-05-23 | End: 2019-05-24

## 2019-05-23 RX ORDER — LOPERAMIDE HYDROCHLORIDE 2 MG/1
2 CAPSULE ORAL ONCE
Status: DISCONTINUED | OUTPATIENT
Start: 2019-05-23 | End: 2019-05-25 | Stop reason: HOSPADM

## 2019-05-23 RX ORDER — ONDANSETRON 2 MG/ML
4 INJECTION INTRAMUSCULAR; INTRAVENOUS EVERY 8 HOURS PRN
Status: DISCONTINUED | OUTPATIENT
Start: 2019-05-23 | End: 2019-05-24

## 2019-05-23 RX ORDER — SODIUM CHLORIDE, SODIUM LACTATE, POTASSIUM CHLORIDE, CALCIUM CHLORIDE 600; 310; 30; 20 MG/100ML; MG/100ML; MG/100ML; MG/100ML
125 INJECTION, SOLUTION INTRAVENOUS CONTINUOUS
Status: DISCONTINUED | OUTPATIENT
Start: 2019-05-23 | End: 2019-05-25 | Stop reason: HOSPADM

## 2019-05-23 RX ORDER — NALBUPHINE HCL 10 MG/ML
5 AMPUL (ML) INJECTION
Status: DISPENSED | OUTPATIENT
Start: 2019-05-23 | End: 2019-05-24

## 2019-05-23 RX ORDER — ONDANSETRON 2 MG/ML
4 INJECTION INTRAMUSCULAR; INTRAVENOUS ONCE AS NEEDED
Status: COMPLETED | OUTPATIENT
Start: 2019-05-23 | End: 2019-05-23

## 2019-05-23 RX ORDER — ACETAMINOPHEN 325 MG/1
650 TABLET ORAL EVERY 6 HOURS PRN
Status: DISCONTINUED | OUTPATIENT
Start: 2019-05-23 | End: 2019-05-25 | Stop reason: HOSPADM

## 2019-05-23 RX ORDER — DOCUSATE SODIUM 100 MG/1
100 CAPSULE, LIQUID FILLED ORAL 2 TIMES DAILY
Status: DISCONTINUED | OUTPATIENT
Start: 2019-05-23 | End: 2019-05-25 | Stop reason: HOSPADM

## 2019-05-23 RX ORDER — METOCLOPRAMIDE HYDROCHLORIDE 5 MG/ML
5 INJECTION INTRAMUSCULAR; INTRAVENOUS EVERY 6 HOURS PRN
Status: ACTIVE | OUTPATIENT
Start: 2019-05-23 | End: 2019-05-24

## 2019-05-23 RX ORDER — MORPHINE SULFATE 0.5 MG/ML
INJECTION, SOLUTION EPIDURAL; INTRATHECAL; INTRAVENOUS AS NEEDED
Status: DISCONTINUED | OUTPATIENT
Start: 2019-05-23 | End: 2019-05-23 | Stop reason: SURG

## 2019-05-23 RX ORDER — NALOXONE HYDROCHLORIDE 0.4 MG/ML
0.1 INJECTION, SOLUTION INTRAMUSCULAR; INTRAVENOUS; SUBCUTANEOUS
Status: ACTIVE | OUTPATIENT
Start: 2019-05-23 | End: 2019-05-24

## 2019-05-23 RX ORDER — DIAPER,BRIEF,INFANT-TODD,DISP
1 EACH MISCELLANEOUS 4 TIMES DAILY PRN
Status: DISCONTINUED | OUTPATIENT
Start: 2019-05-23 | End: 2019-05-25 | Stop reason: HOSPADM

## 2019-05-23 RX ORDER — CALCIUM CARBONATE 200(500)MG
1000 TABLET,CHEWABLE ORAL DAILY PRN
Status: DISCONTINUED | OUTPATIENT
Start: 2019-05-23 | End: 2019-05-25 | Stop reason: HOSPADM

## 2019-05-23 RX ORDER — DEXAMETHASONE SODIUM PHOSPHATE 4 MG/ML
8 INJECTION, SOLUTION INTRA-ARTICULAR; INTRALESIONAL; INTRAMUSCULAR; INTRAVENOUS; SOFT TISSUE ONCE AS NEEDED
Status: DISPENSED | OUTPATIENT
Start: 2019-05-23 | End: 2019-05-24

## 2019-05-23 RX ORDER — OXYTOCIN/RINGER'S LACTATE 30/500 ML
PLASTIC BAG, INJECTION (ML) INTRAVENOUS CONTINUOUS PRN
Status: DISCONTINUED | OUTPATIENT
Start: 2019-05-23 | End: 2019-05-23 | Stop reason: SURG

## 2019-05-23 RX ORDER — CARBOPROST TROMETHAMINE 250 UG/ML
INJECTION, SOLUTION INTRAMUSCULAR AS NEEDED
Status: DISCONTINUED | OUTPATIENT
Start: 2019-05-23 | End: 2019-05-23 | Stop reason: SURG

## 2019-05-23 RX ORDER — IBUPROFEN 600 MG/1
600 TABLET ORAL EVERY 6 HOURS PRN
Status: DISCONTINUED | OUTPATIENT
Start: 2019-05-23 | End: 2019-05-25 | Stop reason: HOSPADM

## 2019-05-23 RX ORDER — HYDROMORPHONE HCL/PF 1 MG/ML
0.5 SYRINGE (ML) INJECTION EVERY 2 HOUR PRN
Status: DISCONTINUED | OUTPATIENT
Start: 2019-05-23 | End: 2019-05-25

## 2019-05-23 RX ORDER — PROPOFOL 10 MG/ML
INJECTION, EMULSION INTRAVENOUS AS NEEDED
Status: DISCONTINUED | OUTPATIENT
Start: 2019-05-23 | End: 2019-05-23 | Stop reason: SURG

## 2019-05-23 RX ORDER — OXYCODONE HYDROCHLORIDE AND ACETAMINOPHEN 5; 325 MG/1; MG/1
1 TABLET ORAL EVERY 4 HOURS PRN
Status: DISCONTINUED | OUTPATIENT
Start: 2019-05-23 | End: 2019-05-25

## 2019-05-23 RX ORDER — ONDANSETRON 2 MG/ML
INJECTION INTRAMUSCULAR; INTRAVENOUS AS NEEDED
Status: DISCONTINUED | OUTPATIENT
Start: 2019-05-23 | End: 2019-05-23 | Stop reason: SURG

## 2019-05-23 RX ORDER — LIDOCAINE HYDROCHLORIDE AND EPINEPHRINE 20; 5 MG/ML; UG/ML
INJECTION, SOLUTION EPIDURAL; INFILTRATION; INTRACAUDAL; PERINEURAL AS NEEDED
Status: DISCONTINUED | OUTPATIENT
Start: 2019-05-23 | End: 2019-05-23 | Stop reason: SURG

## 2019-05-23 RX ORDER — MORPHINE SULFATE 4 MG/ML
2 INJECTION, SOLUTION INTRAMUSCULAR; INTRAVENOUS
Status: DISCONTINUED | OUTPATIENT
Start: 2019-05-23 | End: 2019-05-25

## 2019-05-23 RX ADMIN — ONDANSETRON 4 MG: 2 INJECTION INTRAMUSCULAR; INTRAVENOUS at 19:50

## 2019-05-23 RX ADMIN — PRENATAL VIT W/ FE FUMARATE-FA TAB 27-0.8 MG 1 TABLET: 27-0.8 TAB at 09:23

## 2019-05-23 RX ADMIN — METOCLOPRAMIDE 10 MG: 5 INJECTION, SOLUTION INTRAMUSCULAR; INTRAVENOUS at 18:10

## 2019-05-23 RX ADMIN — MORPHINE SULFATE 2 MG: 0.5 INJECTION, SOLUTION EPIDURAL; INTRATHECAL; INTRAVENOUS at 18:46

## 2019-05-23 RX ADMIN — SODIUM CHLORIDE, SODIUM LACTATE, POTASSIUM CHLORIDE, AND CALCIUM CHLORIDE 125 ML/HR: .6; .31; .03; .02 INJECTION, SOLUTION INTRAVENOUS at 04:27

## 2019-05-23 RX ADMIN — CEFAZOLIN SODIUM 1000 MG: 1 SOLUTION INTRAVENOUS at 06:49

## 2019-05-23 RX ADMIN — MORPHINE SULFATE 2 MG: 4 INJECTION INTRAVENOUS at 20:32

## 2019-05-23 RX ADMIN — CEFAZOLIN SODIUM 1000 MG: 1 SOLUTION INTRAVENOUS at 14:23

## 2019-05-23 RX ADMIN — AZITHROMYCIN 500 MG: 500 INJECTION, POWDER, LYOPHILIZED, FOR SOLUTION INTRAVENOUS at 18:11

## 2019-05-23 RX ADMIN — SODIUM CHLORIDE, SODIUM LACTATE, POTASSIUM CHLORIDE, AND CALCIUM CHLORIDE 125 ML/HR: .6; .31; .03; .02 INJECTION, SOLUTION INTRAVENOUS at 17:32

## 2019-05-23 RX ADMIN — MORPHINE SULFATE 2 MG: 4 INJECTION INTRAVENOUS at 20:45

## 2019-05-23 RX ADMIN — MORPHINE SULFATE 2 MG: 4 INJECTION INTRAVENOUS at 20:22

## 2019-05-23 RX ADMIN — PROPOFOL 30 MG: 10 INJECTION, EMULSION INTRAVENOUS at 18:57

## 2019-05-23 RX ADMIN — ONDANSETRON 4 MG: 2 INJECTION INTRAMUSCULAR; INTRAVENOUS at 18:07

## 2019-05-23 RX ADMIN — LIDOCAINE HYDROCHLORIDE AND EPINEPHRINE 5 ML: 20; 5 INJECTION, SOLUTION EPIDURAL; INFILTRATION; INTRACAUDAL; PERINEURAL at 18:12

## 2019-05-23 RX ADMIN — CEFAZOLIN 3000 MG: 1 INJECTION, POWDER, FOR SOLUTION INTRAMUSCULAR; INTRAVENOUS at 18:10

## 2019-05-23 RX ADMIN — CARBOPROST TROMETHAMINE 250 MCG: 250 INJECTION, SOLUTION INTRAMUSCULAR at 18:43

## 2019-05-23 RX ADMIN — ROPIVACAINE HYDROCHLORIDE: 5 INJECTION, SOLUTION EPIDURAL; INFILTRATION; PERINEURAL at 07:08

## 2019-05-23 RX ADMIN — SODIUM CHLORIDE, SODIUM LACTATE, POTASSIUM CHLORIDE, AND CALCIUM CHLORIDE 125 ML/HR: .6; .31; .03; .02 INJECTION, SOLUTION INTRAVENOUS at 09:42

## 2019-05-23 RX ADMIN — SODIUM CHLORIDE, SODIUM LACTATE, POTASSIUM CHLORIDE, AND CALCIUM CHLORIDE: .6; .31; .03; .02 INJECTION, SOLUTION INTRAVENOUS at 18:02

## 2019-05-23 RX ADMIN — MORPHINE SULFATE 2 MG: 4 INJECTION INTRAVENOUS at 20:16

## 2019-05-23 RX ADMIN — MORPHINE SULFATE 2 MG: 4 INJECTION INTRAVENOUS at 20:54

## 2019-05-23 RX ADMIN — LIDOCAINE HYDROCHLORIDE AND EPINEPHRINE 2 ML: 20; 5 INJECTION, SOLUTION EPIDURAL; INFILTRATION; INTRACAUDAL; PERINEURAL at 18:14

## 2019-05-23 RX ADMIN — MORPHINE SULFATE 2 MG: 4 INJECTION INTRAVENOUS at 20:07

## 2019-05-23 RX ADMIN — Medication 250 MILLI-UNITS/MIN: at 18:32

## 2019-05-23 RX ADMIN — MORPHINE SULFATE 2 MG: 4 INJECTION INTRAVENOUS at 20:35

## 2019-05-23 RX ADMIN — PHENYLEPHRINE HYDROCHLORIDE 100 MCG: 10 INJECTION INTRAVENOUS at 18:31

## 2019-05-23 RX ADMIN — LIDOCAINE HYDROCHLORIDE AND EPINEPHRINE 5 ML: 20; 5 INJECTION, SOLUTION EPIDURAL; INFILTRATION; INTRACAUDAL; PERINEURAL at 18:09

## 2019-05-23 RX ADMIN — SODIUM CHLORIDE, SODIUM LACTATE, POTASSIUM CHLORIDE, AND CALCIUM CHLORIDE 125 ML/HR: .6; .31; .03; .02 INJECTION, SOLUTION INTRAVENOUS at 19:15

## 2019-05-23 RX ADMIN — SODIUM CHLORIDE, SODIUM LACTATE, POTASSIUM CHLORIDE, AND CALCIUM CHLORIDE 125 ML/HR: .6; .31; .03; .02 INJECTION, SOLUTION INTRAVENOUS at 12:18

## 2019-05-23 RX ADMIN — MORPHINE SULFATE 3 MG: 0.5 INJECTION, SOLUTION EPIDURAL; INTRATHECAL; INTRAVENOUS at 18:45

## 2019-05-23 RX ADMIN — Medication 250 MILLI-UNITS/MIN: at 19:15

## 2019-05-23 RX ADMIN — Medication 62.5 MILLI-UNITS/MIN: at 20:49

## 2019-05-23 RX ADMIN — MORPHINE SULFATE 2 MG: 4 INJECTION INTRAVENOUS at 20:00

## 2019-05-23 RX ADMIN — VALACYCLOVIR HYDROCHLORIDE 500 MG: 500 TABLET, FILM COATED ORAL at 09:23

## 2019-05-24 LAB
ERYTHROCYTE [DISTWIDTH] IN BLOOD BY AUTOMATED COUNT: 15.9 % (ref 11.6–15.1)
HCT VFR BLD AUTO: 34.5 % (ref 34.8–46.1)
HGB BLD-MCNC: 10.5 G/DL (ref 11.5–15.4)
MCH RBC QN AUTO: 27.1 PG (ref 26.8–34.3)
MCHC RBC AUTO-ENTMCNC: 30.4 G/DL (ref 31.4–37.4)
MCV RBC AUTO: 89 FL (ref 82–98)
PLATELET # BLD AUTO: 298 THOUSANDS/UL (ref 149–390)
PMV BLD AUTO: 11.4 FL (ref 8.9–12.7)
RBC # BLD AUTO: 3.88 MILLION/UL (ref 3.81–5.12)
WBC # BLD AUTO: 18.85 THOUSAND/UL (ref 4.31–10.16)

## 2019-05-24 PROCEDURE — 94762 N-INVAS EAR/PLS OXIMTRY CONT: CPT

## 2019-05-24 PROCEDURE — 99024 POSTOP FOLLOW-UP VISIT: CPT | Performed by: OBSTETRICS & GYNECOLOGY

## 2019-05-24 PROCEDURE — 85027 COMPLETE CBC AUTOMATED: CPT | Performed by: OBSTETRICS & GYNECOLOGY

## 2019-05-24 RX ORDER — ONDANSETRON 2 MG/ML
4 INJECTION INTRAMUSCULAR; INTRAVENOUS EVERY 8 HOURS PRN
Status: DISCONTINUED | OUTPATIENT
Start: 2019-05-24 | End: 2019-05-25 | Stop reason: HOSPADM

## 2019-05-24 RX ORDER — KETOROLAC TROMETHAMINE 30 MG/ML
30 INJECTION, SOLUTION INTRAMUSCULAR; INTRAVENOUS EVERY 6 HOURS PRN
Status: DISCONTINUED | OUTPATIENT
Start: 2019-05-24 | End: 2019-05-25

## 2019-05-24 RX ORDER — KETOROLAC TROMETHAMINE 30 MG/ML
30 INJECTION, SOLUTION INTRAMUSCULAR; INTRAVENOUS EVERY 6 HOURS PRN
Status: DISCONTINUED | OUTPATIENT
Start: 2019-05-24 | End: 2019-05-24

## 2019-05-24 RX ORDER — ONDANSETRON 2 MG/ML
4 INJECTION INTRAMUSCULAR; INTRAVENOUS EVERY 4 HOURS PRN
Status: ACTIVE | OUTPATIENT
Start: 2019-05-24 | End: 2019-05-24

## 2019-05-24 RX ADMIN — ENOXAPARIN SODIUM 40 MG: 40 INJECTION SUBCUTANEOUS at 10:26

## 2019-05-24 RX ADMIN — PRENATAL VIT W/ FE FUMARATE-FA TAB 27-0.8 MG 1 TABLET: 27-0.8 TAB at 10:31

## 2019-05-24 RX ADMIN — PANTOPRAZOLE SODIUM 20 MG: 20 TABLET, DELAYED RELEASE ORAL at 10:31

## 2019-05-24 RX ADMIN — SODIUM CHLORIDE, SODIUM LACTATE, POTASSIUM CHLORIDE, AND CALCIUM CHLORIDE 125 ML/HR: .6; .31; .03; .02 INJECTION, SOLUTION INTRAVENOUS at 00:51

## 2019-05-24 RX ADMIN — KETOROLAC TROMETHAMINE 30 MG: 30 INJECTION, SOLUTION INTRAMUSCULAR at 18:32

## 2019-05-24 RX ADMIN — KETOROLAC TROMETHAMINE 30 MG: 30 INJECTION, SOLUTION INTRAMUSCULAR at 11:14

## 2019-05-24 RX ADMIN — DOCUSATE SODIUM 100 MG: 100 CAPSULE, LIQUID FILLED ORAL at 10:31

## 2019-05-24 RX ADMIN — DOCUSATE SODIUM 100 MG: 100 CAPSULE, LIQUID FILLED ORAL at 18:19

## 2019-05-25 VITALS
HEIGHT: 60 IN | TEMPERATURE: 98.9 F | DIASTOLIC BLOOD PRESSURE: 69 MMHG | HEART RATE: 101 BPM | WEIGHT: 285 LBS | RESPIRATION RATE: 20 BRPM | OXYGEN SATURATION: 98 % | BODY MASS INDEX: 55.95 KG/M2 | SYSTOLIC BLOOD PRESSURE: 123 MMHG

## 2019-05-25 PROCEDURE — 99024 POSTOP FOLLOW-UP VISIT: CPT | Performed by: OBSTETRICS & GYNECOLOGY

## 2019-05-25 RX ORDER — DIAPER,BRIEF,INFANT-TODD,DISP
1 EACH MISCELLANEOUS 4 TIMES DAILY PRN
Qty: 30 G | Refills: 0
Start: 2019-05-25 | End: 2019-06-07 | Stop reason: ALTCHOICE

## 2019-05-25 RX ORDER — ACETAMINOPHEN 325 MG/1
650 TABLET ORAL EVERY 6 HOURS PRN
Qty: 30 TABLET | Refills: 0
Start: 2019-05-25 | End: 2019-06-07 | Stop reason: ALTCHOICE

## 2019-05-25 RX ORDER — DOCUSATE SODIUM 100 MG/1
100 CAPSULE, LIQUID FILLED ORAL 2 TIMES DAILY
Qty: 10 CAPSULE | Refills: 0
Start: 2019-05-25 | End: 2019-06-07 | Stop reason: ALTCHOICE

## 2019-05-25 RX ORDER — IBUPROFEN 600 MG/1
600 TABLET ORAL EVERY 6 HOURS PRN
Qty: 30 TABLET | Refills: 0
Start: 2019-05-25 | End: 2019-07-01 | Stop reason: ALTCHOICE

## 2019-05-25 RX ORDER — CALCIUM CARBONATE 200(500)MG
1000 TABLET,CHEWABLE ORAL DAILY PRN
Refills: 0
Start: 2019-05-25 | End: 2019-06-07 | Stop reason: ALTCHOICE

## 2019-05-25 RX ORDER — OXYCODONE HYDROCHLORIDE 5 MG/1
5 TABLET ORAL EVERY 4 HOURS PRN
Qty: 15 TABLET | Refills: 0 | Status: SHIPPED | OUTPATIENT
Start: 2019-05-25 | End: 2019-06-04

## 2019-05-25 RX ORDER — OXYCODONE HYDROCHLORIDE 5 MG/1
5 TABLET ORAL EVERY 4 HOURS PRN
Status: DISCONTINUED | OUTPATIENT
Start: 2019-05-25 | End: 2019-05-25 | Stop reason: HOSPADM

## 2019-05-25 RX ORDER — OXYCODONE HYDROCHLORIDE 10 MG/1
10 TABLET ORAL EVERY 4 HOURS PRN
Status: DISCONTINUED | OUTPATIENT
Start: 2019-05-25 | End: 2019-05-25 | Stop reason: HOSPADM

## 2019-05-25 RX ADMIN — HYDROCORTISONE 1 APPLICATION: 1 CREAM TOPICAL at 18:16

## 2019-05-25 RX ADMIN — PANTOPRAZOLE SODIUM 20 MG: 20 TABLET, DELAYED RELEASE ORAL at 06:32

## 2019-05-25 RX ADMIN — IBUPROFEN 600 MG: 600 TABLET ORAL at 12:52

## 2019-05-25 RX ADMIN — DOCUSATE SODIUM 100 MG: 100 CAPSULE, LIQUID FILLED ORAL at 17:59

## 2019-05-25 RX ADMIN — IBUPROFEN 600 MG: 600 TABLET ORAL at 00:34

## 2019-05-25 RX ADMIN — OXYCODONE HYDROCHLORIDE 5 MG: 5 TABLET ORAL at 17:59

## 2019-05-25 RX ADMIN — DOCUSATE SODIUM 100 MG: 100 CAPSULE, LIQUID FILLED ORAL at 10:08

## 2019-05-25 RX ADMIN — IBUPROFEN 600 MG: 600 TABLET ORAL at 06:32

## 2019-05-25 RX ADMIN — PRENATAL VIT W/ FE FUMARATE-FA TAB 27-0.8 MG 1 TABLET: 27-0.8 TAB at 10:07

## 2019-05-25 RX ADMIN — ENOXAPARIN SODIUM 40 MG: 40 INJECTION SUBCUTANEOUS at 10:08

## 2019-05-29 ENCOUNTER — POSTPARTUM VISIT (OUTPATIENT)
Dept: OBGYN CLINIC | Facility: CLINIC | Age: 29
End: 2019-05-29

## 2019-05-29 VITALS — SYSTOLIC BLOOD PRESSURE: 128 MMHG | WEIGHT: 279 LBS | BODY MASS INDEX: 54.49 KG/M2 | DIASTOLIC BLOOD PRESSURE: 74 MMHG

## 2019-05-29 DIAGNOSIS — Z48.89 POSTOPERATIVE VISIT: Primary | ICD-10-CM

## 2019-05-29 PROCEDURE — 99024 POSTOP FOLLOW-UP VISIT: CPT | Performed by: OBSTETRICS & GYNECOLOGY

## 2019-05-31 LAB — PLACENTA IN STORAGE: NORMAL

## 2019-06-05 ENCOUNTER — TELEPHONE (OUTPATIENT)
Dept: POSTPARTUM | Facility: CLINIC | Age: 29
End: 2019-06-05

## 2019-06-07 ENCOUNTER — OFFICE VISIT (OUTPATIENT)
Dept: POSTPARTUM | Facility: CLINIC | Age: 29
End: 2019-06-07
Payer: COMMERCIAL

## 2019-06-07 VITALS — DIASTOLIC BLOOD PRESSURE: 92 MMHG | SYSTOLIC BLOOD PRESSURE: 130 MMHG

## 2019-06-07 DIAGNOSIS — O92.70 LACTATION PROBLEM: ICD-10-CM

## 2019-06-07 DIAGNOSIS — Z71.89 ENCOUNTER FOR BREAST FEEDING COUNSELING: Primary | ICD-10-CM

## 2019-06-07 PROCEDURE — 99404 PREV MED CNSL INDIV APPRX 60: CPT | Performed by: PEDIATRICS

## 2019-06-10 ENCOUNTER — POSTPARTUM VISIT (OUTPATIENT)
Dept: OBGYN CLINIC | Facility: CLINIC | Age: 29
End: 2019-06-10

## 2019-06-10 ENCOUNTER — TELEPHONE (OUTPATIENT)
Dept: OBGYN CLINIC | Facility: CLINIC | Age: 29
End: 2019-06-10

## 2019-06-10 VITALS — DIASTOLIC BLOOD PRESSURE: 98 MMHG | BODY MASS INDEX: 51.87 KG/M2 | WEIGHT: 265.6 LBS | SYSTOLIC BLOOD PRESSURE: 136 MMHG

## 2019-06-10 DIAGNOSIS — Z3A.38 38 WEEKS GESTATION OF PREGNANCY: ICD-10-CM

## 2019-06-10 DIAGNOSIS — O99.213 MATERNAL MORBID OBESITY IN THIRD TRIMESTER, ANTEPARTUM (HCC): ICD-10-CM

## 2019-06-10 DIAGNOSIS — E66.01 MATERNAL MORBID OBESITY IN THIRD TRIMESTER, ANTEPARTUM (HCC): ICD-10-CM

## 2019-06-10 DIAGNOSIS — O10.913 CHRONIC HYPERTENSION IN OBSTETRIC CONTEXT IN THIRD TRIMESTER: ICD-10-CM

## 2019-06-10 PROCEDURE — 99024 POSTOP FOLLOW-UP VISIT: CPT | Performed by: OBSTETRICS & GYNECOLOGY

## 2019-07-01 ENCOUNTER — TELEPHONE (OUTPATIENT)
Dept: OBGYN CLINIC | Facility: CLINIC | Age: 29
End: 2019-07-01

## 2019-07-01 ENCOUNTER — POSTPARTUM VISIT (OUTPATIENT)
Dept: OBGYN CLINIC | Facility: CLINIC | Age: 29
End: 2019-07-01

## 2019-07-01 VITALS — SYSTOLIC BLOOD PRESSURE: 122 MMHG | BODY MASS INDEX: 53.32 KG/M2 | DIASTOLIC BLOOD PRESSURE: 72 MMHG | WEIGHT: 273 LBS

## 2019-07-01 DIAGNOSIS — Z30.41 ENCOUNTER FOR SURVEILLANCE OF CONTRACEPTIVE PILLS: ICD-10-CM

## 2019-07-01 DIAGNOSIS — R22.2 ABDOMINAL WALL LUMP: ICD-10-CM

## 2019-07-01 PROCEDURE — 99024 POSTOP FOLLOW-UP VISIT: CPT | Performed by: OBSTETRICS & GYNECOLOGY

## 2019-07-01 RX ORDER — DROSPIRENONE AND ETHINYL ESTRADIOL 0.02-3(28)
1 KIT ORAL DAILY
Qty: 84 TABLET | Refills: 4 | Status: SHIPPED | OUTPATIENT
Start: 2019-07-01 | End: 2020-06-25

## 2019-07-01 NOTE — TELEPHONE ENCOUNTER
Sofie Alvares - states patient ordered Celeste - and she has an allergy  to drospirenone - ethinyl estradiol  Patient states she is allergic to [place] brand drospirenone ethinyl estradiol 3/0 15 mg -  increases her blood pressure - states she can take Celeste  Routing to provider for advice

## 2019-07-02 PROBLEM — O09.93 HIGH-RISK PREGNANCY IN THIRD TRIMESTER: Status: RESOLVED | Noted: 2018-12-12 | Resolved: 2019-07-02

## 2019-07-02 PROBLEM — Z3A.38 38 WEEKS GESTATION OF PREGNANCY: Status: RESOLVED | Noted: 2019-01-23 | Resolved: 2019-07-02

## 2019-07-02 PROBLEM — O10.913 CHRONIC HYPERTENSION IN OBSTETRIC CONTEXT IN THIRD TRIMESTER: Status: RESOLVED | Noted: 2019-04-11 | Resolved: 2019-07-02

## 2019-07-02 PROBLEM — R12 HEART BURN: Status: RESOLVED | Noted: 2018-11-14 | Resolved: 2019-07-02

## 2019-07-02 PROBLEM — R80.9 PROTEINURIA: Status: RESOLVED | Noted: 2017-06-01 | Resolved: 2019-07-02

## 2019-07-02 PROBLEM — E66.01 MATERNAL MORBID OBESITY IN THIRD TRIMESTER, ANTEPARTUM (HCC): Status: RESOLVED | Noted: 2019-05-03 | Resolved: 2019-07-02

## 2019-07-02 PROBLEM — O99.213 MATERNAL MORBID OBESITY IN THIRD TRIMESTER, ANTEPARTUM (HCC): Status: RESOLVED | Noted: 2019-05-03 | Resolved: 2019-07-02

## 2019-07-02 PROBLEM — O99.213 OBESITY AFFECTING PREGNANCY IN THIRD TRIMESTER: Status: RESOLVED | Noted: 2019-04-19 | Resolved: 2019-07-02

## 2019-07-02 NOTE — PROGRESS NOTES
Assessment/Plan     Normal postpartum exam      1  Contraception: OCP (estrogen/progesterone)  2  Annual exam due in July  Will return for repeat Pap with a month  3  Lump in anterior abdominal wall/pannus - ultrasound ordered  4  Increase activity as tolerated, may resume all normal activity  5  Anticipated return to work: 6 - 12 weeks post partum  Lui Prather is a 34 y o  female who presents for a postpartum visit  She is 6 weeks postpartum following a primary  section, low transverse incision  I have fully reviewed the prenatal and intrapartum course  The delivery was at 38 2 gestational weeks  Anesthesia: epidural  Laceration: n/a  Bleeding no bleeding  Bowel function is normal  Bladder function is normal  Patient has not been sexually active  Desired contraception method is OCP (estrogen/progesterone)  Postpartum depression screening: negative  EPDS : 0  Baby's course has been uneventful  Baby is feeding by bottle - formula  Last Pap : ASCUS, HPV   Gestational Diabetes: no  Pregnancy Complications: obesity    The following portions of the patient's history were reviewed and updated as appropriate: allergies, current medications, past family history, past medical history, past social history, past surgical history and problem list       Current Outpatient Medications:     drospirenone-ethinyl estradiol (TATY) 3-0 02 MG per tablet, Take 1 tablet by mouth daily, Disp: 84 tablet, Rfl: 4    Allergies   Allergen Reactions    Erythromycin Shortness Of Breath    Penicillins Hives and Other (See Comments)     Mom told pt she had reaction when she was an infant, but can have Amoxicillin      Medical Tape Rash       Review of Systems  Constitutional: no fever, feels well  Breasts: no complaints of breast pain, breast lump, or nipple discharge  Gastrointestinal: no complaints nausea, vomiting  Genitourinary: as noted in HPI    Neurological: no complaints of headache      Objective      /72   Wt 124 kg (273 lb)   LMP 08/28/2018 (Exact Date)   Breastfeeding?  No   BMI 53 32 kg/m²     OBGyn Exam  General: alert and oriented, in no acute distress  Abdomen: soft, non-tender, without masses or organomegaly, soft, nontender, without guarding, without rebound and 3cm lump at edge of pannus, non tender  Incision: clean, dry, and intact

## 2019-07-03 ENCOUNTER — HOSPITAL ENCOUNTER (OUTPATIENT)
Dept: ULTRASOUND IMAGING | Facility: HOSPITAL | Age: 29
Discharge: HOME | End: 2019-07-03
Attending: OBSTETRICS & GYNECOLOGY
Payer: COMMERCIAL

## 2019-07-03 DIAGNOSIS — R22.2 ABDOMINAL WALL LUMP: ICD-10-CM

## 2019-07-03 PROCEDURE — 76705 ECHO EXAM OF ABDOMEN: CPT

## 2019-07-22 ENCOUNTER — ANNUAL EXAM (OUTPATIENT)
Dept: OBGYN CLINIC | Facility: CLINIC | Age: 29
End: 2019-07-22
Payer: COMMERCIAL

## 2019-07-22 VITALS
BODY MASS INDEX: 51.89 KG/M2 | SYSTOLIC BLOOD PRESSURE: 132 MMHG | HEIGHT: 62 IN | WEIGHT: 282 LBS | DIASTOLIC BLOOD PRESSURE: 78 MMHG

## 2019-07-22 DIAGNOSIS — Z01.419 WELL FEMALE EXAM WITH ROUTINE GYNECOLOGICAL EXAM: Primary | ICD-10-CM

## 2019-07-22 DIAGNOSIS — Z72.51 UNPROTECTED SEX: ICD-10-CM

## 2019-07-22 DIAGNOSIS — Z12.4 SCREENING FOR MALIGNANT NEOPLASM OF THE CERVIX: ICD-10-CM

## 2019-07-22 LAB — SL AMB POCT URINE HCG: NEGATIVE

## 2019-07-22 PROCEDURE — 81025 URINE PREGNANCY TEST: CPT | Performed by: OBSTETRICS & GYNECOLOGY

## 2019-07-22 PROCEDURE — 99395 PREV VISIT EST AGE 18-39: CPT | Performed by: OBSTETRICS & GYNECOLOGY

## 2019-07-22 PROCEDURE — G0124 SCREEN C/V THIN LAYER BY MD: HCPCS | Performed by: PATHOLOGY

## 2019-07-22 PROCEDURE — G0145 SCR C/V CYTO,THINLAYER,RESCR: HCPCS | Performed by: PATHOLOGY

## 2019-07-23 NOTE — PROGRESS NOTES
ASSESSMENT & PLAN: Pepper Milan is a 34 y o  [place] with normal gynecologic exam     1   Routine well woman exam done today  2    Pap and HPV:Pap with reflex HPV was done today  Current ASCCP Guidelines reviewed  Last Pap  2018 :  ASCUS with POSITIVE high risk HPV  3   The patient declined STD testing  Safe sex practices have been discussed  4  The patient is sexually active  She has not yet started her OCP for contraception and options have been discussed  UPT neg today  Willl start OCP today and take UPT in 1 week  5  The following were reviewed in today's visit: breast self exam, STD testing, use and side effects of OCPs, family planning choices, exercise and healthy diet  6  Abd wall lumps - US showed no descrete findings, but palpable lumps in pannus - pt will make appt with Dr Nicole Fine  Patient to return to office in 12 months for annual      All questions have been answered to her satisfaction  CC:  Annual Gynecologic Examination    HPI: Pepper Milan is a 34 y o  Jailene Ryder who presents for annual gynecologic examination  She has the following concerns:  Contraception, lumps in pannus    Health Maintenance:    She exercises 0 days per week  She wears her seatbelt routinely  She does perform irregular monthly self breast exams  She feels safe at home  Patients does follow a regular diet  Past Medical History:   Diagnosis Date    Abnormal Pap smear of cervix     Anxiety     Eating disorder     Gallstone     Genital herpes     HPV (human papilloma virus) infection     Hypertension     Varicella     imm       Past Surgical History:   Procedure Laterality Date    COLPOSCOPY      NE  DELIVERY ONLY N/A 2019    Procedure:  SECTION (); Surgeon: Rangel Castillo MD;  Location: [facility];  Service: Obstetrics       Past OB/Gyn History:  Period Cycle (Days): (n/a absent since recent delivery )No LMP recorded (approximate)    Menstrual History:  OB History    1    Para   1    Term   1            AB   0    Living   1       SAB        TAB        Ectopic        Multiple   0    Live Births   1           Obstetric Comments     Menarche 13            History of sexually transmitted infection No  Patient is currently sexually active  heterosexual Birth control: none  Last Pap  2018 :  ASCUS with POSITIVE high risk HPV      Family History   Problem Relation Age of Onset    Hypertension Mother     Hyperlipidemia Mother     Asthma Mother     Mental illness Mother    [de-identified] / [place] Mother     Lung cancer Mother     Liver cancer Mother     Brain cancer Mother     Hyperlipidemia Maternal Aunt     Early death Maternal Aunt     Brain cancer Maternal Aunt     Lung cancer Maternal Aunt     No Known Problems Father     Drug abuse Brother     Mental illness Brother     Hypertension Maternal Grandmother     Diabetes Maternal Grandmother         type 2    Hearing loss Maternal Grandmother     Early death Maternal Grandmother     Stroke Maternal Grandmother     Cervical cancer Maternal Grandmother     No Known Problems Daughter        Social History:  Social History     Socioeconomic History    Marital status: Single     Spouse name: Deandra Mahoney    Number of children: Not on file    Years of education: [employer]    Highest education level: Not on file   Occupational History    Occupation: MA   Social Needs    Financial resource strain: Not on file    Food insecurity:     Worry: Not on file     Inability: Not on file   StatSocial needs:     Medical: Not on file     Non-medical: Not on file   Tobacco Use    Smoking status: Never Smoker    Smokeless tobacco: Never Used   Substance and Sexual Activity    Alcohol use: Not Currently    Drug use: Never    Sexual activity: Yes     Partners: Male     Birth control/protection: None   Lifestyle    Physical activity:     Days per week: Not on file     Minutes per session: Not on file    Stress: Not on file   Relationships    Social connections:     Talks on phone: Not on file     Gets together: Not on file     Attends [employer] service: Not on file     Active member of club or organization: Not on file     Attends meetings of clubs or organizations: Not on file     Relationship status: Not on file    Intimate partner violence:     Fear of current or ex partner: Not on file     Emotionally abused: Not on file     Physically abused: Not on file     Forced sexual activity: Not on file   Other Topics Concern    Not on file   Social History Narrative    Not on file     Presently lives with her family  Patient is co-habitating  Patient is currently un- employed   Allergies   Allergen Reactions    Erythromycin Shortness Of Breath    Penicillins Hives and Other (See Comments)     Mom told pt she had reaction when she was an infant, but can have Amoxicillin      Medical Tape Rash       Current Outpatient Medications:     drospirenone-ethinyl estradiol (TATY) 3-0 02 MG per tablet, Take 1 tablet by mouth daily (Patient not taking: Reported on 7/22/2019), Disp: 84 tablet, Rfl: 4    Review of Systems:  Review of Systems   Constitutional: Negative  HENT: Negative  Respiratory: Negative  Cardiovascular: Negative  Gastrointestinal: Negative  Lumps in pannus     Genitourinary: Negative  Neurological: Negative  Psychiatric/Behavioral: Negative  Physical Exam:  /78   Ht 5' 1 5" (1 562 m)   Wt 128 kg (282 lb)   LMP  (Approximate) Comment: period absent since delivery   Breastfeeding? No   BMI 52 42 kg/m²    Physical Exam   Constitutional: She is oriented to person, place, and time  She appears well-developed and well-nourished  No distress  Genitourinary: Vagina normal  There is no rash, tenderness, lesion or injury on the right labia  There is no rash, tenderness, lesion or injury on the left labia  Vagina exhibits rugosity   No tenderness or bleeding in the vagina  No vaginal discharge found  Right adnexum does not display mass, does not display tenderness and does not display fullness  Left adnexum does not display mass, does not display tenderness and does not display fullness  Cervix is parous  Cervix does not exhibit motion tenderness or discharge  Uterus is tender and mobile  Uterus is not enlarged  Genitourinary Comments: Non tender bladder   HENT:   Head: Normocephalic and atraumatic  Cardiovascular: Normal rate, regular rhythm and normal heart sounds  No murmur heard  Pulmonary/Chest: Effort normal and breath sounds normal  No respiratory distress  She has no wheezes  Right breast exhibits no inverted nipple, no mass, no nipple discharge, no skin change and no tenderness  Left breast exhibits no inverted nipple, no mass, no nipple discharge, no skin change and no tenderness  Abdominal: Soft  She exhibits mass (right pannus 3 3cm mobile non tender lumps)  She exhibits no distension  There is no tenderness  There is no guarding  Neurological: She is alert and oriented to person, place, and time  Skin: Skin is warm and dry  She is not diaphoretic  No erythema  No pallor  Nursing note and vitals reviewed

## 2019-07-26 LAB
LAB AP GYN PRIMARY INTERPRETATION: ABNORMAL
Lab: ABNORMAL
PATH INTERP SPEC-IMP: ABNORMAL

## 2019-08-19 ENCOUNTER — OFFICE VISIT (OUTPATIENT)
Dept: FAMILY MEDICINE CLINIC | Facility: CLINIC | Age: 29
End: 2019-08-19
Payer: COMMERCIAL

## 2019-08-19 ENCOUNTER — TELEPHONE (OUTPATIENT)
Dept: OBGYN CLINIC | Facility: CLINIC | Age: 29
End: 2019-08-19

## 2019-08-19 VITALS
BODY MASS INDEX: 54.23 KG/M2 | HEART RATE: 80 BPM | WEIGHT: 287 LBS | RESPIRATION RATE: 16 BRPM | DIASTOLIC BLOOD PRESSURE: 90 MMHG | TEMPERATURE: 97.6 F | SYSTOLIC BLOOD PRESSURE: 140 MMHG

## 2019-08-19 DIAGNOSIS — I10 ESSENTIAL HYPERTENSION: Primary | ICD-10-CM

## 2019-08-19 DIAGNOSIS — L85.3 DRY SKIN DERMATITIS: ICD-10-CM

## 2019-08-19 PROCEDURE — 99214 OFFICE O/P EST MOD 30 MIN: CPT | Performed by: FAMILY MEDICINE

## 2019-08-19 RX ORDER — AMMONIUM LACTATE 12 G/100G
CREAM TOPICAL AS NEEDED
Qty: 385 G | Refills: 0 | Status: SHIPPED | OUTPATIENT
Start: 2019-08-19 | End: 2020-02-19 | Stop reason: ALTCHOICE

## 2019-08-19 RX ORDER — HYDROCHLOROTHIAZIDE 25 MG/1
25 TABLET ORAL DAILY
Qty: 30 TABLET | Refills: 5 | Status: SHIPPED | OUTPATIENT
Start: 2019-08-19 | End: 2019-10-24 | Stop reason: SDUPTHER

## 2019-08-19 NOTE — PROGRESS NOTES
Assessment/Plan:  Patient will be started on HCTZ 25 mg daily  Patient instructed to follow a low-fat, low-salt and a low-carbohydrate diet and get regular aerobic exercise walking up to 150 minutes per week as tolerated  Weight loss strongly encouraged  Recommend patient get potassium supplements in her diet  Patient to monitor blood pressure at home and at work and return to the office in 3 months  Diagnoses and all orders for this visit:    Essential hypertension  -     hydrochlorothiazide (HYDRODIURIL) 25 mg tablet; Take 1 tablet (25 mg total) by mouth daily    Dry skin dermatitis  -     ammonium lactate (LAC-HYDRIN) 12 % cream; Apply topically as needed for dry skin          Subjective:      Patient ID: Riya Alexandre is a 34 y o  female  Patient is concerned about her blood pressure being elevated at 170/100 earlier today while at work  She complains of headache and lightheadedness  Patient states she has a history of hypertension diagnosed approximately 4 years ago previous took medication for her elevated blood pressure  Patient states she then lost 85 lb and was able to wean off her blood pressure medication  She recently delivered a baby 3 months ago and was not on blood pressure medication during the pregnancy although states she gained back all her weight and has been unable to lose it  Patient is not breastfeeding and returned to work 1 week ago  Hypertension   This is a recurrent problem  The current episode started today  The problem is uncontrolled  Associated symptoms include anxiety, blurred vision, headaches, peripheral edema and shortness of breath  Pertinent negatives include no chest pain, orthopnea, palpitations or PND  [facility]) Risk factors for coronary artery disease include obesity  Past treatments include nothing  Compliance problems include exercise and diet  There is no history of CAD/MI or CVA         The following portions of the patient's history were reviewed and updated as appropriate: allergies, current medications, past family history, past medical history, past social history, past surgical history and problem list     Review of Systems   Eyes: Positive for blurred vision  Respiratory: Positive for shortness of breath  Cardiovascular: Negative for chest pain, palpitations, orthopnea and PND  Neurological: Positive for headaches  Objective:      /90 (BP Location: Left arm, Patient Position: Sitting, Cuff Size: Extra-Large)   Pulse 80   Temp 97 6 °F (36 4 °C)   Resp 16   Wt 130 kg (287 lb)   BMI 54 23 kg/m²          Physical Exam   Constitutional: She is oriented to person, place, and time  She appears well-developed and well-nourished  No distress  HENT:   Head: Normocephalic  Mouth/Throat: Oropharynx is clear and moist    Eyes: Conjunctivae are normal  No scleral icterus  Neck: Neck supple  No thyromegaly present  Cardiovascular: Normal rate and regular rhythm  Pulmonary/Chest: Effort normal and breath sounds normal    Abdominal: Soft  There is no tenderness  Musculoskeletal: She exhibits edema  Mild pretibial edema  Lymphadenopathy:     She has no cervical adenopathy  Neurological: She is alert and oriented to person, place, and time  Skin: Skin is warm and dry  Psychiatric: She has a normal mood and affect

## 2019-08-19 NOTE — TELEPHONE ENCOUNTER
First menses since delivery saturating a pad every 1/2 to 45 minutes  Advised this can be normal for first period  Motrin 600 mg every 6 hours with food  States BP is 172/100 advised to recheck and call PCP if still elevated  Reviewed with Dr Keely Gabriel to patient left message on voicemail advised Dr Shantanu Peralta agrees with plan Motrin 600 mg every 6 hours with food, repeat BP if still elevated call PCP  Keep appt for 9/10/19

## 2019-08-19 NOTE — LETTER
August 19, 2019     Patient: Carrie Roa   YOB: 1990   Date of Visit: 8/19/2019       To Whom it May Concern:    Carrie Roa is under my professional care  She was seen in my office on 8/19/2019  If you have any questions or concerns, please don't hesitate to call           Sincerely,          Yue Ryan DO        CC: No Recipients

## 2019-08-21 ENCOUNTER — TELEPHONE (OUTPATIENT)
Dept: FAMILY MEDICINE CLINIC | Facility: CLINIC | Age: 29
End: 2019-08-21

## 2019-08-21 DIAGNOSIS — I10 HTN (HYPERTENSION), BENIGN: Primary | ICD-10-CM

## 2019-08-21 RX ORDER — LISINOPRIL 10 MG/1
10 TABLET ORAL DAILY
Qty: 30 TABLET | Refills: 3 | Status: SHIPPED | OUTPATIENT
Start: 2019-08-21 | End: 2019-09-10

## 2019-08-21 NOTE — TELEPHONE ENCOUNTER
Call patient and will add lisinopril 10 mg daily and recommend patient continue HCTZ 25 mg daily  Prescription sent to [facility] pharmacy in [place]

## 2019-08-21 NOTE — TELEPHONE ENCOUNTER
Took call from patient made her aware of recommendation  Patient will monitor BP and give us a call with an update

## 2019-08-21 NOTE — TELEPHONE ENCOUNTER
Patient states blood pressure is still high on new medication  It was 160/18, 150/110, and 150/108 today  Please advise

## 2019-08-22 ENCOUNTER — OFFICE VISIT (OUTPATIENT)
Dept: FAMILY MEDICINE CLINIC | Facility: CLINIC | Age: 29
End: 2019-08-22
Payer: COMMERCIAL

## 2019-08-22 VITALS
HEIGHT: 62 IN | WEIGHT: 282 LBS | OXYGEN SATURATION: 97 % | RESPIRATION RATE: 16 BRPM | BODY MASS INDEX: 51.89 KG/M2 | DIASTOLIC BLOOD PRESSURE: 88 MMHG | SYSTOLIC BLOOD PRESSURE: 130 MMHG | HEART RATE: 92 BPM | TEMPERATURE: 97.7 F

## 2019-08-22 DIAGNOSIS — F41.9 ANXIETY: ICD-10-CM

## 2019-08-22 DIAGNOSIS — I10 HTN (HYPERTENSION), BENIGN: Primary | ICD-10-CM

## 2019-08-22 DIAGNOSIS — R07.9 CHEST PAIN, UNSPECIFIED TYPE: ICD-10-CM

## 2019-08-22 PROCEDURE — 99214 OFFICE O/P EST MOD 30 MIN: CPT | Performed by: FAMILY MEDICINE

## 2019-08-22 PROCEDURE — 3075F SYST BP GE 130 - 139MM HG: CPT | Performed by: FAMILY MEDICINE

## 2019-08-22 PROCEDURE — 1036F TOBACCO NON-USER: CPT | Performed by: FAMILY MEDICINE

## 2019-08-22 PROCEDURE — 3008F BODY MASS INDEX DOCD: CPT | Performed by: FAMILY MEDICINE

## 2019-08-22 PROCEDURE — 93000 ELECTROCARDIOGRAM COMPLETE: CPT | Performed by: FAMILY MEDICINE

## 2019-08-22 NOTE — PROGRESS NOTES
Assessment/Plan:  EKG reveals no acute changes  Patient will be scheduled for echocardiogram as previously recommended by Dr Oscar Galaviz at [facility] 1 year ago  Labs drawn for CBC, CMP and TSH  Will heed results  Patient to continue present treatment with lisinopril 10 mg daily and HCTZ 25 mg daily  Patient instructed to follow a low-fat, low-salt and low-carbohydrate diet  Recommend regular exercise walking 150 minutes per week  Weight loss strongly encouraged  Recommend referral to psychologist for counseling  Patient will be kept out of work this week and next week  Return to the office in 1 month or sooner gricel Vickers Diagnoses and all orders for this visit:    HTN (hypertension), benign  -     Cancel: CBC and Platelet; Future  -     Cancel: Comprehensive metabolic panel; Future  -     Cancel: TSH, 3rd generation with Free T4 reflex; Future  -     POCT ECG  -     CBC and Platelet  -     Comprehensive metabolic panel  -     TSH, 3rd generation with Free T4 reflex    Chest pain, unspecified type  -     POCT ECG  -     CBC and Platelet  -     Comprehensive metabolic panel  -     TSH, 3rd generation with Free T4 reflex    Anxiety  -     CBC and Platelet  -     Comprehensive metabolic panel  -     TSH, 3rd generation with Free T4 reflex          Subjective:      Patient ID: Verner Favia is a 34 y o  female  Patient complains of blood pressure being elevated in the range of 160/100 and heart rate 101 while at work earlier today  Patient has been taking HCTZ for 3 days and added lisinopril this morning  Patient complains of increased stress and anxiety at work and with having a 1month-old baby at home  She is not breast-feeding  Patient is not getting much sleep  She complains of chest pain and palpitations associated with emotional stress and not associated with physical exertion    Patient was seen in the emergency room over year ago with similar symptoms of chest pain and palpitations and had an EKG and was referred to Dr Giovani Norton at [facility] who ordered an echocardiogram although patient never had the echocardiogram performed and never followed up with Dr Giovani Norton  Hypertension   This is a chronic problem  The problem is uncontrolled  Associated symptoms include anxiety, chest pain, palpitations, peripheral edema and shortness of breath  Pertinent negatives include no blurred vision, headaches, orthopnea or PND  Risk factors for coronary artery disease include family history and obesity  Past treatments include ACE inhibitors and diuretics  The current treatment provides moderate improvement  Compliance problems include exercise and diet  There is no history of CAD/MI or CVA  Anxiety   Presents for follow-up visit  Symptoms include chest pain, decreased concentration, excessive worry, insomnia, nervous/anxious behavior, palpitations, panic and shortness of breath  Patient reports no depressed mood, hyperventilation, irritability, restlessness or suicidal ideas  The quality of sleep is poor  Nighttime awakenings: several            The following portions of the patient's history were reviewed and updated as appropriate: allergies, current medications, past family history, past medical history, past social history, past surgical history and problem list     Review of Systems   Constitutional: Negative for irritability  Eyes: Negative for blurred vision  Respiratory: Positive for shortness of breath  Cardiovascular: Positive for chest pain and palpitations  Negative for orthopnea and PND  Neurological: Negative for headaches  Psychiatric/Behavioral: Positive for decreased concentration  Negative for suicidal ideas  The patient is nervous/anxious and has insomnia            Objective:      /88   Pulse 92   Temp 97 7 °F (36 5 °C) (Oral)   Resp 16   Ht 5' 2" (1 575 m)   Wt 128 kg (282 lb)   SpO2 97%   BMI 51 58 kg/m²          Physical Exam   Constitutional: She is oriented to person, place, and time  She appears well-developed and well-nourished  No distress  HENT:   Head: Normocephalic  Mouth/Throat: Oropharynx is clear and moist    Eyes: Conjunctivae are normal  No scleral icterus  Neck: Neck supple  No thyromegaly present  Cardiovascular: Normal rate and regular rhythm  Pulmonary/Chest: Effort normal and breath sounds normal  No respiratory distress  Abdominal: Soft  There is no tenderness  Musculoskeletal: She exhibits no edema  Lymphadenopathy:     She has no cervical adenopathy  Neurological: She is alert and oriented to person, place, and time  Skin: Skin is warm and dry  Psychiatric:   Patient appears quite anxious

## 2019-08-22 NOTE — LETTER
August 22, 2019     Patient: Yonis Elder   YOB: 1990   Date of Visit: 8/22/2019       To Whom it May Concern:    Yonis Elder is under my professional care  She was seen in my office on 8/22/2019  She may return to work on 9/03/19  Please excuse her for her absence  If you have any questions or concerns, please don't hesitate to call           Sincerely,          Mono Hurt DO        CC: No Recipients

## 2019-08-22 NOTE — TELEPHONE ENCOUNTER
Took call from patient stating she start the losartan in addition to hydrochlorothiazide and her BP is 160/100 pulse rate of 100 and chest tightness  Advised patient come into the office to be evaluated by Dr Jason Daniel and discuss medication options  Patient agreed scheduled for 10:15       FYI - Dr Jason Daniel

## 2019-08-23 DIAGNOSIS — D72.829 LEUKOCYTOSIS, UNSPECIFIED TYPE: Primary | ICD-10-CM

## 2019-08-23 LAB
ALBUMIN SERPL-MCNC: 3.7 G/DL (ref 3.6–5.1)
ALBUMIN/GLOB SERPL: 1 (CALC) (ref 1–2.5)
ALP SERPL-CCNC: 83 U/L (ref 33–115)
ALT SERPL-CCNC: 13 U/L (ref 6–29)
AST SERPL-CCNC: 10 U/L (ref 10–30)
BILIRUB SERPL-MCNC: 0.2 MG/DL (ref 0.2–1.2)
BUN SERPL-MCNC: 15 MG/DL (ref 7–25)
BUN/CREAT SERPL: NORMAL (CALC) (ref 6–22)
CALCIUM SERPL-MCNC: 9.7 MG/DL (ref 8.6–10.2)
CHLORIDE SERPL-SCNC: 99 MMOL/L (ref 98–110)
CO2 SERPL-SCNC: 29 MMOL/L (ref 20–32)
CREAT SERPL-MCNC: 0.75 MG/DL (ref 0.5–1.1)
ERYTHROCYTE [DISTWIDTH] IN BLOOD BY AUTOMATED COUNT: 15.3 % (ref 11–15)
GLOBULIN SER CALC-MCNC: 3.6 G/DL (CALC) (ref 1.9–3.7)
GLUCOSE SERPL-MCNC: 95 MG/DL (ref 65–99)
HCT VFR BLD AUTO: 39.9 % (ref 35–45)
HGB BLD-MCNC: 12.2 G/DL (ref 11.7–15.5)
MCH RBC QN AUTO: 23.6 PG (ref 27–33)
MCHC RBC AUTO-ENTMCNC: 30.6 G/DL (ref 32–36)
MCV RBC AUTO: 77.3 FL (ref 80–100)
PLATELET # BLD AUTO: 470 THOUSAND/UL (ref 140–400)
PMV BLD REES-ECKER: 10.9 FL (ref 7.5–12.5)
POTASSIUM SERPL-SCNC: 4.2 MMOL/L (ref 3.5–5.3)
PROT SERPL-MCNC: 7.3 G/DL (ref 6.1–8.1)
RBC # BLD AUTO: 5.16 MILLION/UL (ref 3.8–5.1)
SL AMB EGFR AFRICAN AMERICAN: 125 ML/MIN/1.73M2
SL AMB EGFR NON AFRICAN AMERICAN: 108 ML/MIN/1.73M2
SODIUM SERPL-SCNC: 137 MMOL/L (ref 135–146)
TSH SERPL-ACNC: 1.5 MIU/L
WBC # BLD AUTO: 13 THOUSAND/UL (ref 3.8–10.8)

## 2019-09-09 ENCOUNTER — OFFICE VISIT (OUTPATIENT)
Dept: FAMILY MEDICINE CLINIC | Facility: CLINIC | Age: 29
End: 2019-09-09
Payer: COMMERCIAL

## 2019-09-09 VITALS
HEIGHT: 62 IN | TEMPERATURE: 98.4 F | SYSTOLIC BLOOD PRESSURE: 136 MMHG | WEIGHT: 285 LBS | RESPIRATION RATE: 16 BRPM | HEART RATE: 76 BPM | DIASTOLIC BLOOD PRESSURE: 90 MMHG | BODY MASS INDEX: 52.44 KG/M2

## 2019-09-09 DIAGNOSIS — R19.7 DIARRHEA, UNSPECIFIED TYPE: ICD-10-CM

## 2019-09-09 DIAGNOSIS — R11.0 NAUSEA: Primary | ICD-10-CM

## 2019-09-09 DIAGNOSIS — R12 HEARTBURN: ICD-10-CM

## 2019-09-09 LAB — SL AMB POCT URINE HCG: NEGATIVE

## 2019-09-09 PROCEDURE — 81025 URINE PREGNANCY TEST: CPT | Performed by: FAMILY MEDICINE

## 2019-09-09 PROCEDURE — 99214 OFFICE O/P EST MOD 30 MIN: CPT | Performed by: FAMILY MEDICINE

## 2019-09-09 PROCEDURE — 36415 COLL VENOUS BLD VENIPUNCTURE: CPT | Performed by: FAMILY MEDICINE

## 2019-09-09 RX ORDER — RANITIDINE 150 MG/1
150 CAPSULE ORAL 2 TIMES DAILY
Qty: 30 CAPSULE | Refills: 2 | Status: SHIPPED | OUTPATIENT
Start: 2019-09-09 | End: 2019-10-24 | Stop reason: CLARIF

## 2019-09-09 NOTE — PROGRESS NOTES
Assessment/Plan:  Urine pregnancy test negative  Labs drawn for CBC and CMP  Patient will be started on Zantac 150 mg b i d   Patient may take Pepto-Bismol or Imodium gricel Valiente Patient encouraged to drink plenty of fluids in the form of 7 up, ginger ale and Gatorade and follow a bland/brat diet  Recommend rest   Patient to return to the office in 1 week or sooner gricel Valiente Diagnoses and all orders for this visit:    Nausea  -     CBC and Platelet  -     Comprehensive metabolic panel  -     POCT urine HCG  -     ranitidine (ZANTAC) 150 MG capsule; Take 1 capsule (150 mg total) by mouth 2 (two) times a day    Diarrhea, unspecified type  -     CBC and Platelet  -     Comprehensive metabolic panel    Heartburn  -     ranitidine (ZANTAC) 150 MG capsule; Take 1 capsule (150 mg total) by mouth 2 (two) times a day          Subjective:      Patient ID: Chepe Bell is a 34 y o  female  Patient started 2 days ago with nausea and diarrhea after eating possibly undercooked sausage and hamburger at home  She denies vomiting  She admits to upper abdominal pains and heartburn after eating  Patient denies fever  Patient denies urinary symptoms  She denies recent antibiotics, ill contacts or recent travel  She denies well water  Patient has treated this with Tums with temporary relief  Patient has a history of gallstones and heartburn during her recent pregnancy  Nausea   This is a new problem  The current episode started in the past 7 days  The problem occurs intermittently  The problem has been gradually worsening  Associated symptoms include abdominal pain, a change in bowel habit, diaphoresis, fatigue and nausea  Pertinent negatives include no anorexia, chest pain, chills, coughing, fever, headaches, myalgias or vomiting  Heartburn   She complains of abdominal pain, heartburn and nausea   She reports no belching, no chest pain, no choking, no coughing, no dysphagia, no early satiety, no globus sensation or no hoarse voice  This is a recurrent problem  The current episode started 1 to 4 weeks ago  The problem occurs occasionally  The problem has been gradually worsening  The heartburn wakes her from sleep  The heartburn does not limit her activity  The symptoms are aggravated by certain foods  Associated symptoms include fatigue  Pertinent negatives include no anemia, melena or weight loss  Risk factors include obesity  She has tried an antacid for the symptoms  The treatment provided moderate relief  Past procedures do not include an EGD or a UGI  Diarrhea    This is a new problem  The current episode started in the past 7 days  The problem occurs 2 to 4 times per day  The problem has been unchanged  The stool consistency is described as watery  Associated symptoms include abdominal pain  Pertinent negatives include no chills, coughing, fever, headaches, myalgias, vomiting or weight loss  Risk factors include suspect food intake  She has tried nothing for the symptoms  The following portions of the patient's history were reviewed and updated as appropriate: allergies, current medications, past family history, past medical history, past social history, past surgical history and problem list     Review of Systems   Constitutional: Positive for diaphoresis and fatigue  Negative for chills, fever and weight loss  HENT: Negative for hoarse voice  Respiratory: Negative for cough and choking  Cardiovascular: Negative for chest pain  Gastrointestinal: Positive for abdominal pain, change in bowel habit, diarrhea, heartburn and nausea  Negative for anorexia, dysphagia, melena and vomiting  Musculoskeletal: Negative for myalgias  Neurological: Negative for headaches           Objective:      /90 (BP Location: Left arm, Patient Position: Sitting, Cuff Size: Large)   Pulse 76   Temp 98 4 °F (36 9 °C) (Tympanic)   Resp 16   Ht 5' 2" (1 575 m)   Wt 129 kg (285 lb)   BMI 52 13 kg/m² Physical Exam   Constitutional: She appears well-developed and well-nourished  Non-toxic appearance  She does not appear ill  No distress  HENT:   Head: Normocephalic  Mouth/Throat: Oropharynx is clear and moist    Eyes: No scleral icterus  Cardiovascular: Normal rate and regular rhythm  Pulmonary/Chest: Effort normal and breath sounds normal    Abdominal: Soft  Normal appearance and bowel sounds are normal  There is tenderness in the epigastric area  There is no rigidity, no rebound, no guarding, no CVA tenderness and negative Kim's sign  Mild epigastric tenderness  Negative right upper quadrant or left upper quadrant tenderness  Negative lower abdominal tenderness  Skin: Skin is warm and dry  BMI Counseling: Body mass index is 52 13 kg/m²  Discussed the patient's BMI with her  The BMI is above average  BMI counseling and education was provided to the patient  Nutrition recommendations include reducing portion sizes, decreasing overall calorie intake, reducing fast food intake, consuming healthier snacks, decreasing soda and/or juice intake, moderation in carbohydrate intake and reducing intake of saturated fat and trans fat  Exercise recommendations include moderate aerobic physical activity for 150 minutes/week

## 2019-09-10 ENCOUNTER — PROCEDURE VISIT (OUTPATIENT)
Dept: OBGYN CLINIC | Facility: CLINIC | Age: 29
End: 2019-09-10
Payer: COMMERCIAL

## 2019-09-10 VITALS
DIASTOLIC BLOOD PRESSURE: 76 MMHG | WEIGHT: 284 LBS | SYSTOLIC BLOOD PRESSURE: 122 MMHG | HEIGHT: 64 IN | BODY MASS INDEX: 48.49 KG/M2

## 2019-09-10 DIAGNOSIS — Z23 NEED FOR HPV VACCINE: ICD-10-CM

## 2019-09-10 DIAGNOSIS — R87.612 LGSIL ON PAP SMEAR OF CERVIX: Primary | ICD-10-CM

## 2019-09-10 LAB
ALBUMIN SERPL-MCNC: 3.7 G/DL (ref 3.6–5.1)
ALBUMIN/GLOB SERPL: 1.1 (CALC) (ref 1–2.5)
ALP SERPL-CCNC: 84 U/L (ref 33–115)
ALT SERPL-CCNC: 11 U/L (ref 6–29)
AST SERPL-CCNC: 8 U/L (ref 10–30)
BILIRUB SERPL-MCNC: 0.2 MG/DL (ref 0.2–1.2)
BUN SERPL-MCNC: 13 MG/DL (ref 7–25)
BUN/CREAT SERPL: ABNORMAL (CALC) (ref 6–22)
CALCIUM SERPL-MCNC: 9.1 MG/DL (ref 8.6–10.2)
CHLORIDE SERPL-SCNC: 102 MMOL/L (ref 98–110)
CO2 SERPL-SCNC: 23 MMOL/L (ref 20–32)
CREAT SERPL-MCNC: 0.77 MG/DL (ref 0.5–1.1)
ERYTHROCYTE [DISTWIDTH] IN BLOOD BY AUTOMATED COUNT: 15.5 % (ref 11–15)
GLOBULIN SER CALC-MCNC: 3.5 G/DL (CALC) (ref 1.9–3.7)
GLUCOSE SERPL-MCNC: 78 MG/DL (ref 65–99)
HCT VFR BLD AUTO: 41.2 % (ref 35–45)
HGB BLD-MCNC: 12.6 G/DL (ref 11.7–15.5)
MCH RBC QN AUTO: 23.8 PG (ref 27–33)
MCHC RBC AUTO-ENTMCNC: 30.6 G/DL (ref 32–36)
MCV RBC AUTO: 77.7 FL (ref 80–100)
PLATELET # BLD AUTO: 476 THOUSAND/UL (ref 140–400)
PMV BLD REES-ECKER: 11.1 FL (ref 7.5–12.5)
POTASSIUM SERPL-SCNC: 4.7 MMOL/L (ref 3.5–5.3)
PROT SERPL-MCNC: 7.2 G/DL (ref 6.1–8.1)
RBC # BLD AUTO: 5.3 MILLION/UL (ref 3.8–5.1)
SL AMB EGFR AFRICAN AMERICAN: 121 ML/MIN/1.73M2
SL AMB EGFR NON AFRICAN AMERICAN: 104 ML/MIN/1.73M2
SL AMB POCT URINE HCG: NEGATIVE
SODIUM SERPL-SCNC: 138 MMOL/L (ref 135–146)
WBC # BLD AUTO: 12.1 THOUSAND/UL (ref 3.8–10.8)

## 2019-09-10 PROCEDURE — 81025 URINE PREGNANCY TEST: CPT | Performed by: OBSTETRICS & GYNECOLOGY

## 2019-09-10 PROCEDURE — 88305 TISSUE EXAM BY PATHOLOGIST: CPT | Performed by: PATHOLOGY

## 2019-09-10 PROCEDURE — 90651 9VHPV VACCINE 2/3 DOSE IM: CPT | Performed by: OBSTETRICS & GYNECOLOGY

## 2019-09-10 PROCEDURE — 57455 BIOPSY OF CERVIX W/SCOPE: CPT | Performed by: OBSTETRICS & GYNECOLOGY

## 2019-09-10 PROCEDURE — 90471 IMMUNIZATION ADMIN: CPT | Performed by: OBSTETRICS & GYNECOLOGY

## 2019-09-11 NOTE — PROGRESS NOTES
Colposcopy  Date/Time: 9/10/2019 11:11 PM  Performed by: Michael Feng MD  Authorized by: Michael Feng MD     Consent:     Consent obtained:  Written    Consent given by:  Patient    Procedural risks discussed:  Bleeding, damage to other organs, failure rate, infection and repeat procedure    Patient questions answered: yes    Pre-procedure:     Prepped with: acetic acid    Indication:     Indication:  LSIL  Procedure:     Procedure: Colposcopy w/ biopsy of cervix      Geneva speculum was placed in the vagina: yes      Under colposcopic examination the transition zone was seen in entirety: no      Monsel's solution was applied: no      Biopsy(s): yes      Location:  1 oclock    Specimen to pathology: yes    Post-procedure:     Impression: Low grade cervical dysplasia    Comments:      ECC  No complemented - concern for early oreg    Gardisil vaccine series started today

## 2019-10-24 ENCOUNTER — OFFICE VISIT (OUTPATIENT)
Dept: FAMILY MEDICINE CLINIC | Facility: CLINIC | Age: 29
End: 2019-10-24
Payer: COMMERCIAL

## 2019-10-24 VITALS
HEIGHT: 64 IN | TEMPERATURE: 97.4 F | BODY MASS INDEX: 50.02 KG/M2 | DIASTOLIC BLOOD PRESSURE: 88 MMHG | WEIGHT: 293 LBS | HEART RATE: 88 BPM | OXYGEN SATURATION: 99 % | SYSTOLIC BLOOD PRESSURE: 138 MMHG

## 2019-10-24 DIAGNOSIS — R10.11 RIGHT UPPER QUADRANT ABDOMINAL PAIN: ICD-10-CM

## 2019-10-24 DIAGNOSIS — I10 ESSENTIAL HYPERTENSION: ICD-10-CM

## 2019-10-24 DIAGNOSIS — K58.2 IRRITABLE BOWEL SYNDROME WITH BOTH CONSTIPATION AND DIARRHEA: Primary | ICD-10-CM

## 2019-10-24 PROCEDURE — 3075F SYST BP GE 130 - 139MM HG: CPT | Performed by: FAMILY MEDICINE

## 2019-10-24 PROCEDURE — 3079F DIAST BP 80-89 MM HG: CPT | Performed by: FAMILY MEDICINE

## 2019-10-24 PROCEDURE — 99214 OFFICE O/P EST MOD 30 MIN: CPT | Performed by: FAMILY MEDICINE

## 2019-10-24 PROCEDURE — 3008F BODY MASS INDEX DOCD: CPT | Performed by: FAMILY MEDICINE

## 2019-10-24 RX ORDER — HYDROCHLOROTHIAZIDE 25 MG/1
25 TABLET ORAL DAILY
Qty: 30 TABLET | Refills: 5 | Status: SHIPPED | OUTPATIENT
Start: 2019-10-24 | End: 2020-02-17 | Stop reason: SDUPTHER

## 2019-10-24 RX ORDER — DIPHENOXYLATE HYDROCHLORIDE AND ATROPINE SULFATE 2.5; .025 MG/1; MG/1
1 TABLET ORAL DAILY
COMMUNITY
End: 2020-12-30

## 2019-10-24 RX ORDER — FAMOTIDINE 20 MG/1
20 TABLET, FILM COATED ORAL 2 TIMES DAILY
Qty: 60 TABLET | Refills: 1 | Status: SHIPPED | OUTPATIENT
Start: 2019-10-24 | End: 2019-12-27 | Stop reason: SDUPTHER

## 2019-10-24 NOTE — PROGRESS NOTES
Assessment/Plan:  No significant findings on exam   Recommend starting Pepcid daily  Follow-up with Gastroenterology if no improvement  Also recommended starting hydrochlorothiazide for elevated blood pressure  Continue to check home blood pressures and recheck here in the office again in 1-2 weeks  Side effect profile medication reviewed  Recommend abdominal ultrasound if symptoms persist   Consider blood testing if fevers develop or symptoms worsen  No problem-specific Assessment & Plan notes found for this encounter  Diagnoses and all orders for this visit:    Irritable bowel syndrome with both constipation and diarrhea  -     Ambulatory referral to Gastroenterology; Future  -     famotidine (PEPCID) 20 mg tablet; Take 1 tablet (20 mg total) by mouth 2 (two) times a day    Essential hypertension  -     hydrochlorothiazide (HYDRODIURIL) 25 mg tablet; Take 1 tablet (25 mg total) by mouth daily    Right upper quadrant abdominal pain  -     US abdomen complete; Future    Other orders  -     multivitamin (THERAGRAN) TABS; Take 1 tablet by mouth daily          Subjective:      Patient ID: Barber Israel is a 34 y o  female  Patient continues with mild gastritis symptoms  She did not fill out the prescription for Zantac as she states it was on back order  She has mild diarrhea intermittently and occasional bloating  She notes blood pressure is a a been slightly elevated at home  She was on hydrochlorothiazide prior to the last pregnancy  She is currently formula feeding her baby  Denies any fever sweats or chills  The following portions of the patient's history were reviewed and updated as appropriate: allergies, current medications, past family history, past medical history, past social history, past surgical history and problem list     Review of Systems   Constitutional: Negative  HENT: Negative  Eyes: Negative  Respiratory: Negative  Cardiovascular: Negative  Gastrointestinal:        As noted in HPI   Endocrine: Negative  Genitourinary: Negative  Musculoskeletal: Negative  Skin: Negative  Allergic/Immunologic: Negative  Neurological: Negative  Hematological: Negative  Psychiatric/Behavioral: Negative            Objective:      /88 (BP Location: Left arm, Patient Position: Sitting, Cuff Size: Standard)   Pulse 88   Temp (!) 97 4 °F (36 3 °C) (Tympanic)   Ht 5' 3 5" (1 613 m)   Wt 134 kg (295 lb)   LMP  (LMP Unknown)   SpO2 99%   BMI 51 44 kg/m²          Physical Exam

## 2019-10-31 ENCOUNTER — OFFICE VISIT (OUTPATIENT)
Dept: OBGYN CLINIC | Facility: CLINIC | Age: 29
End: 2019-10-31
Payer: COMMERCIAL

## 2019-10-31 VITALS — BODY MASS INDEX: 51.37 KG/M2 | WEIGHT: 293 LBS | SYSTOLIC BLOOD PRESSURE: 128 MMHG | DIASTOLIC BLOOD PRESSURE: 82 MMHG

## 2019-10-31 DIAGNOSIS — L91.8 SKIN TAG: Primary | ICD-10-CM

## 2019-10-31 PROCEDURE — 99213 OFFICE O/P EST LOW 20 MIN: CPT | Performed by: OBSTETRICS & GYNECOLOGY

## 2019-11-02 NOTE — PROGRESS NOTES
Assessment/Plan:   Diagnoses and all orders for this visit:    Skin tag  -     Skin tag removal    Removal of skin tag performed in office  Patient reports also having skin tags in axilla  Recommended for patient to seek out dermatology consultation as she has noticed skin tags in other locations, mainly axilla and groin  Discussed with patient that persistent skin sores, acne, and now skin tags may also be a result of friction from inner thighs rubbing together  Discussed patient's weight management as she had a significant increase in weight during her pregnancy and that weight has remained  Patient reports previously being able to successfully lose weight prior to her pregnancy  Discussed with patient possible options for weight management including participation of her partner with her efforts for weight loss in order to continue motivation  Subjective:    Patient ID: Gagan Ortiz is a 34 y o  female  Chief Complaint   Patient presents with    Procedure     Pt is here for skin tag removal, skin tag is on left thigh  Patient is a 32yo  presenting with complaints of skin tag located on L inner thigh  She inquires if the skin tags are a side effect of her OCPs  She reports noticing skin tags not only in her inner thigh, groin, but also in bilateral axilla  Patient also acknowledges her significant weight gain during pregnancy and persisting morbid obesity  Patient states that she understands that she needs to lose weight but also states that her stay at home situation with the baby is contributing to her lack of motivation to exercise and states that she tends to "constantly eat when bored"       The following portions of the patient's history were reviewed and updated as appropriate: allergies, current medications, past family history, past medical history, past social history, past surgical history and problem list     Review of Systems   Constitutional: Positive for activity change and appetite change  Skin: Negative for color change, pallor, rash and wound  Skin tag in areas of friction, axilla, groin areas         Objective:  /82 (BP Location: Right arm, Patient Position: Sitting, Cuff Size: Large)   Wt 134 kg (294 lb 9 6 oz)   LMP  (LMP Unknown)   BMI 51 37 kg/m²      Physical Exam   Constitutional: She appears well-developed and well-nourished  No distress  HENT:   Head: Normocephalic and atraumatic  Genitourinary:         Skin: Skin is warm and dry  No rash noted  She is not diaphoretic  No erythema  No pallor  Cystic acne and comedones noted on bilateral inner thighs mainly in groin area associated with areas of inner thigh rubbing and within fold of skin related to patient's obesity  Psychiatric: She has a normal mood and affect  Her behavior is normal  Judgment and thought content normal    Nursing note and vitals reviewed

## 2019-11-02 NOTE — PROGRESS NOTES
Skin tag removal  Date/Time: 11/2/2019 5:08 PM  Performed by: Steve Pena MD  Authorized by: Steve Pena MD     Lesion 6:      Small <0 5 cm skin tag located on L inner thigh near groin identified by patient and provider  Area cleaned with betadine prep  2mL of lidocaine injected for anesthesia  The skin tag was then grasped with forceps and using an 11 blade scalpel the skin tag was removed in entirety  Pressure was applied to the area and silver nitrate applied for hemostasis  Patient tolerated the procedure well

## 2019-11-25 ENCOUNTER — TELEPHONE (OUTPATIENT)
Dept: OBGYN CLINIC | Facility: CLINIC | Age: 29
End: 2019-11-25

## 2019-11-25 NOTE — TELEPHONE ENCOUNTER
Left message on nurse line calling to r/s gardisil injection  RC left message - appt cancelled - please call back to reschedule

## 2019-12-03 ENCOUNTER — CLINICAL SUPPORT (OUTPATIENT)
Dept: OBGYN CLINIC | Facility: CLINIC | Age: 29
End: 2019-12-03

## 2019-12-03 VITALS — DIASTOLIC BLOOD PRESSURE: 82 MMHG | WEIGHT: 293 LBS | BODY MASS INDEX: 51.82 KG/M2 | SYSTOLIC BLOOD PRESSURE: 124 MMHG

## 2019-12-03 DIAGNOSIS — Z23 NEED FOR HPV VACCINE: Primary | ICD-10-CM

## 2019-12-03 PROCEDURE — 90651 9VHPV VACCINE 2/3 DOSE IM: CPT | Performed by: OBSTETRICS & GYNECOLOGY

## 2019-12-03 PROCEDURE — 90471 IMMUNIZATION ADMIN: CPT | Performed by: OBSTETRICS & GYNECOLOGY

## 2019-12-03 NOTE — PROGRESS NOTES
Pt was seen today for her second gardasil injection  Injection was given in right deltoid without difficulty, pt tolerated injection well  Pt to return to office in 4 months for her last gardasil

## 2019-12-06 ENCOUNTER — TELEPHONE (OUTPATIENT)
Dept: OBGYN CLINIC | Facility: CLINIC | Age: 29
End: 2019-12-06

## 2019-12-06 DIAGNOSIS — Z30.41 ENCOUNTER FOR SURVEILLANCE OF CONTRACEPTIVE PILLS: Primary | ICD-10-CM

## 2019-12-06 RX ORDER — NORGESTIMATE AND ETHINYL ESTRADIOL 0.25-0.035
1 KIT ORAL DAILY
Qty: 84 TABLET | Refills: 3 | Status: SHIPPED | OUTPATIENT
Start: 2019-12-06 | End: 2020-02-19 | Stop reason: ALTCHOICE

## 2019-12-06 NOTE — TELEPHONE ENCOUNTER
Patient states they have generic but not actual TATY - can cannot take generic  If you send new prescription please send to new script to [facility]     Routing to provider for advice

## 2019-12-06 NOTE — TELEPHONE ENCOUNTER
LM for patient to call the office  No one has actual Celeste and we can send in [phone] [facility] but she might end up with generic  Routing to provider to order [phone] [facility]

## 2019-12-06 NOTE — TELEPHONE ENCOUNTER
Patient states she is Celeste but unable to get from pharmacy - unavailable - [facility] and [facility]     She states she was on Sprintec in the past and it helped  She needs for Sunday start  Pharmacy updated  Routing to provider for advice

## 2019-12-06 NOTE — TELEPHONE ENCOUNTER
Spoke with patient advised Job Mom will be sent to [facility] pharmacy  Verbalized understanding    Routing to provider to order

## 2019-12-18 DIAGNOSIS — K58.2 IRRITABLE BOWEL SYNDROME WITH BOTH CONSTIPATION AND DIARRHEA: ICD-10-CM

## 2019-12-18 RX ORDER — FAMOTIDINE 20 MG/1
TABLET, FILM COATED ORAL
Qty: 60 TABLET | Refills: 1 | OUTPATIENT
Start: 2019-12-18

## 2019-12-27 DIAGNOSIS — K58.2 IRRITABLE BOWEL SYNDROME WITH BOTH CONSTIPATION AND DIARRHEA: ICD-10-CM

## 2019-12-27 RX ORDER — FAMOTIDINE 20 MG/1
20 TABLET, FILM COATED ORAL 2 TIMES DAILY
Qty: 60 TABLET | Refills: 3 | Status: SHIPPED | OUTPATIENT
Start: 2019-12-27 | End: 2020-01-10 | Stop reason: SDUPTHER

## 2020-01-10 DIAGNOSIS — K58.2 IRRITABLE BOWEL SYNDROME WITH BOTH CONSTIPATION AND DIARRHEA: ICD-10-CM

## 2020-01-10 RX ORDER — FAMOTIDINE 20 MG/1
20 TABLET, FILM COATED ORAL 2 TIMES DAILY
Qty: 60 TABLET | Refills: 3 | Status: SHIPPED | OUTPATIENT
Start: 2020-01-10 | End: 2020-02-17 | Stop reason: SDUPTHER

## 2020-01-10 NOTE — TELEPHONE ENCOUNTER
Patient called and stated they will not fill this famatodine until 1/19/2020  Could you please send new script for her?   Please advise  Thank you

## 2020-02-15 DIAGNOSIS — I10 ESSENTIAL HYPERTENSION: ICD-10-CM

## 2020-02-17 DIAGNOSIS — K58.2 IRRITABLE BOWEL SYNDROME WITH BOTH CONSTIPATION AND DIARRHEA: ICD-10-CM

## 2020-02-17 DIAGNOSIS — I10 ESSENTIAL HYPERTENSION: ICD-10-CM

## 2020-02-17 RX ORDER — HYDROCHLOROTHIAZIDE 25 MG/1
TABLET ORAL
Qty: 90 TABLET | Refills: 2 | OUTPATIENT
Start: 2020-02-17

## 2020-02-17 RX ORDER — HYDROCHLOROTHIAZIDE 25 MG/1
25 TABLET ORAL DAILY
Qty: 90 TABLET | Refills: 3 | Status: SHIPPED | OUTPATIENT
Start: 2020-02-17 | End: 2020-02-19 | Stop reason: SDUPTHER

## 2020-02-17 RX ORDER — FAMOTIDINE 20 MG/1
20 TABLET, FILM COATED ORAL 2 TIMES DAILY
Qty: 180 TABLET | Refills: 3 | Status: SHIPPED | OUTPATIENT
Start: 2020-02-17 | End: 2020-02-19 | Stop reason: ALTCHOICE

## 2020-02-19 ENCOUNTER — OFFICE VISIT (OUTPATIENT)
Dept: FAMILY MEDICINE CLINIC | Facility: CLINIC | Age: 30
End: 2020-02-19
Payer: COMMERCIAL

## 2020-02-19 VITALS
OXYGEN SATURATION: 99 % | BODY MASS INDEX: 51.91 KG/M2 | WEIGHT: 293 LBS | HEART RATE: 92 BPM | SYSTOLIC BLOOD PRESSURE: 130 MMHG | DIASTOLIC BLOOD PRESSURE: 70 MMHG | HEIGHT: 63 IN | TEMPERATURE: 97.9 F

## 2020-02-19 DIAGNOSIS — M72.2 PLANTAR FASCIITIS: ICD-10-CM

## 2020-02-19 DIAGNOSIS — K21.9 GASTROESOPHAGEAL REFLUX DISEASE, ESOPHAGITIS PRESENCE NOT SPECIFIED: Primary | ICD-10-CM

## 2020-02-19 DIAGNOSIS — I10 ESSENTIAL HYPERTENSION: ICD-10-CM

## 2020-02-19 DIAGNOSIS — K58.2 IRRITABLE BOWEL SYNDROME WITH BOTH CONSTIPATION AND DIARRHEA: ICD-10-CM

## 2020-02-19 PROCEDURE — 3075F SYST BP GE 130 - 139MM HG: CPT | Performed by: NURSE PRACTITIONER

## 2020-02-19 PROCEDURE — 99214 OFFICE O/P EST MOD 30 MIN: CPT | Performed by: NURSE PRACTITIONER

## 2020-02-19 PROCEDURE — 1036F TOBACCO NON-USER: CPT | Performed by: NURSE PRACTITIONER

## 2020-02-19 PROCEDURE — 3078F DIAST BP <80 MM HG: CPT | Performed by: NURSE PRACTITIONER

## 2020-02-19 RX ORDER — HYDROCHLOROTHIAZIDE 25 MG/1
25 TABLET ORAL DAILY
Qty: 90 TABLET | Refills: 3 | Status: SHIPPED | OUTPATIENT
Start: 2020-02-19 | End: 2020-05-29 | Stop reason: ALTCHOICE

## 2020-02-19 RX ORDER — FAMOTIDINE 20 MG/1
20 TABLET, FILM COATED ORAL 2 TIMES DAILY
Qty: 180 TABLET | Refills: 3 | Status: CANCELLED | OUTPATIENT
Start: 2020-02-19

## 2020-02-19 RX ORDER — PANTOPRAZOLE SODIUM 20 MG/1
20 TABLET, DELAYED RELEASE ORAL
Qty: 30 TABLET | Refills: 3 | Status: SHIPPED | OUTPATIENT
Start: 2020-02-19 | End: 2020-06-06 | Stop reason: SDUPTHER

## 2020-02-19 NOTE — PROGRESS NOTES
Assessment/Plan:    No problem-specific Assessment & Plan notes found for this encounter  Diagnoses and all orders for this visit:    Gastroesophageal reflux disease, esophagitis presence not specified  -     pantoprazole (PROTONIX) 20 mg tablet; Take 1 tablet (20 mg total) by mouth daily before breakfast   Discontinue Pepcid  Return to office if no improvement in 3 weeks  Will then can consider testing for H pylori or referral to GI  Irritable bowel syndrome with both constipation and diarrhea    Essential hypertension  -     hydrochlorothiazide (HYDRODIURIL) 25 mg tablet; Take 1 tablet (25 mg total) by mouth daily  Patient is doing well on this med and will continue it  Plantar fasciitis  Use ice-water bottle roll under foot several times a day  Wear well-padded shoes at all times  Discussed stretching exercises  If no improvement will refer to podiatrist   Return immediately if experiencing any leg swelling or redness  Other orders  -     Cancel: famotidine (PEPCID) 20 mg tablet; Take 1 tablet (20 mg total) by mouth 2 (two) times a day        Subjective:      Patient ID: Thelma Ibarra is a 34 y o  female  Pt reports right heel pain with radiation up leg since last night  No self-treatment  Has had some leg swelling; no redness or warmth  Wears Ugg non-padded slippers on hardwood floors in house  Is taking HCTZ 25 mg for HTN, which is well controlled  Has bad acid reflux all day; pepcid bid not helping  The following portions of the patient's history were reviewed and updated as appropriate: allergies, current medications, past family history, past medical history, past social history, past surgical history and problem list     Review of Systems   Constitutional: Negative for activity change, appetite change, diaphoresis, fatigue and fever  Respiratory: Negative for cough, chest tightness and shortness of breath      Cardiovascular: Negative for chest pain, palpitations and leg swelling  Gastrointestinal: Negative for nausea and vomiting  Musculoskeletal: Positive for gait problem  Objective:      /70 (BP Location: Left arm, Patient Position: Sitting, Cuff Size: Adult)   Pulse 92   Temp 97 9 °F (36 6 °C)   Ht 5' 3 39" (1 61 m)   Wt 136 kg (300 lb)   LMP  (Approximate)   SpO2 99%   BMI 52 50 kg/m²          Physical Exam   Constitutional: She appears well-developed and well-nourished  HENT:   Head: Normocephalic  Eyes: Conjunctivae are normal    Neck: Normal range of motion  Neck supple  Cardiovascular: Normal rate, regular rhythm, normal heart sounds and intact distal pulses  No murmur heard  Pulmonary/Chest: Effort normal  She has wheezes  She has rales  Musculoskeletal: Normal range of motion  She exhibits tenderness (Reproducible tenderness bilateral right heel without redness or warmth  No right lower leg or ankle edema or warmth)  She exhibits no edema  Psychiatric: She has a normal mood and affect   Her behavior is normal  Thought content normal

## 2020-03-30 DIAGNOSIS — Z30.41 ENCOUNTER FOR SURVEILLANCE OF CONTRACEPTIVE PILLS: ICD-10-CM

## 2020-03-30 RX ORDER — DROSPIRENONE AND ETHINYL ESTRADIOL 0.02-3(28)
1 KIT ORAL DAILY
Qty: 84 TABLET | Refills: 0 | Status: CANCELLED | OUTPATIENT
Start: 2020-03-30

## 2020-03-30 NOTE — TELEPHONE ENCOUNTER
Pt was given a 15 month supply of Celeste on 07/01/2019     [facility] and they do have refills on file  Called pt left detailed VM that Dr Ashkan Hernandez gave 15 month supply on 07/01/19 which should cover her until 10/2020  Pharmacy was called and pharmacy does already have refills on file  In the future pt needs to call pharmacy to initiate the refills  [facility] phone # given  Call office with questions

## 2020-04-06 ENCOUNTER — TELEPHONE (OUTPATIENT)
Dept: OBGYN CLINIC | Facility: CLINIC | Age: 30
End: 2020-04-06

## 2020-04-13 ENCOUNTER — TELEPHONE (OUTPATIENT)
Dept: OBGYN CLINIC | Facility: CLINIC | Age: 30
End: 2020-04-13

## 2020-04-13 ENCOUNTER — TELEMEDICINE (OUTPATIENT)
Dept: FAMILY MEDICINE CLINIC | Facility: CLINIC | Age: 30
End: 2020-04-13
Payer: COMMERCIAL

## 2020-04-13 DIAGNOSIS — L72.9 INFECTED CYST OF SKIN: ICD-10-CM

## 2020-04-13 DIAGNOSIS — I10 ESSENTIAL HYPERTENSION: Primary | ICD-10-CM

## 2020-04-13 DIAGNOSIS — L08.9 INFECTED CYST OF SKIN: ICD-10-CM

## 2020-04-13 PROCEDURE — 99442 PR PHYS/QHP TELEPHONE EVALUATION 11-20 MIN: CPT | Performed by: FAMILY MEDICINE

## 2020-04-13 RX ORDER — DOXYCYCLINE HYCLATE 100 MG/1
100 CAPSULE ORAL EVERY 12 HOURS SCHEDULED
Qty: 20 CAPSULE | Refills: 0 | Status: SHIPPED | OUTPATIENT
Start: 2020-04-13 | End: 2020-04-23

## 2020-04-21 ENCOUNTER — TELEMEDICINE (OUTPATIENT)
Dept: FAMILY MEDICINE CLINIC | Facility: CLINIC | Age: 30
End: 2020-04-21
Payer: COMMERCIAL

## 2020-04-21 DIAGNOSIS — I10 ESSENTIAL HYPERTENSION: Primary | ICD-10-CM

## 2020-04-21 PROCEDURE — 99441 PR PHYS/QHP TELEPHONE EVALUATION 5-10 MIN: CPT | Performed by: FAMILY MEDICINE

## 2020-05-26 ENCOUNTER — TELEPHONE (OUTPATIENT)
Dept: OBGYN CLINIC | Facility: CLINIC | Age: 30
End: 2020-05-26

## 2020-05-26 DIAGNOSIS — B00.9 HSV INFECTION: Primary | ICD-10-CM

## 2020-05-26 RX ORDER — VALACYCLOVIR HYDROCHLORIDE 500 MG/1
500 TABLET, FILM COATED ORAL 2 TIMES DAILY
Qty: 10 TABLET | Refills: 5 | Status: SHIPPED | OUTPATIENT
Start: 2020-05-26 | End: 2020-06-09

## 2020-05-28 ENCOUNTER — OFFICE VISIT (OUTPATIENT)
Dept: FAMILY MEDICINE CLINIC | Facility: CLINIC | Age: 30
End: 2020-05-28
Payer: COMMERCIAL

## 2020-05-28 ENCOUNTER — APPOINTMENT (EMERGENCY)
Dept: RADIOLOGY | Facility: HOSPITAL | Age: 30
End: 2020-05-28
Payer: COMMERCIAL

## 2020-05-28 ENCOUNTER — HOSPITAL ENCOUNTER (EMERGENCY)
Facility: HOSPITAL | Age: 30
Discharge: HOME | End: 2020-05-28
Attending: EMERGENCY MEDICINE | Admitting: EMERGENCY MEDICINE
Payer: COMMERCIAL

## 2020-05-28 VITALS
BODY MASS INDEX: 54.09 KG/M2 | WEIGHT: 293 LBS | OXYGEN SATURATION: 98 % | RESPIRATION RATE: 18 BRPM | TEMPERATURE: 98.1 F | DIASTOLIC BLOOD PRESSURE: 61 MMHG | SYSTOLIC BLOOD PRESSURE: 137 MMHG | HEART RATE: 109 BPM

## 2020-05-28 VITALS
OXYGEN SATURATION: 98 % | HEART RATE: 127 BPM | TEMPERATURE: 98 F | RESPIRATION RATE: 20 BRPM | SYSTOLIC BLOOD PRESSURE: 140 MMHG | DIASTOLIC BLOOD PRESSURE: 80 MMHG

## 2020-05-28 DIAGNOSIS — R06.00 DYSPNEA, UNSPECIFIED TYPE: ICD-10-CM

## 2020-05-28 DIAGNOSIS — R07.9 CHEST PAIN, UNSPECIFIED TYPE: Primary | ICD-10-CM

## 2020-05-28 DIAGNOSIS — R06.02 SOB (SHORTNESS OF BREATH): ICD-10-CM

## 2020-05-28 DIAGNOSIS — R07.89 CHEST DISCOMFORT: Primary | ICD-10-CM

## 2020-05-28 LAB
ANION GAP SERPL CALCULATED.3IONS-SCNC: 8 MMOL/L (ref 4–13)
APTT PPP: 25 SECONDS (ref 23–37)
BACTERIA UR QL AUTO: ABNORMAL /HPF
BASOPHILS # BLD AUTO: 0.06 THOUSANDS/ΜL (ref 0–0.1)
BASOPHILS NFR BLD AUTO: 0 % (ref 0–1)
BILIRUB UR QL STRIP: NEGATIVE
BUN SERPL-MCNC: 12 MG/DL (ref 5–25)
CALCIUM SERPL-MCNC: 9.4 MG/DL (ref 8.3–10.1)
CHLORIDE SERPL-SCNC: 100 MMOL/L (ref 100–108)
CLARITY UR: CLEAR
CO2 SERPL-SCNC: 29 MMOL/L (ref 21–32)
COLOR UR: YELLOW
CREAT SERPL-MCNC: 0.87 MG/DL (ref 0.6–1.3)
D DIMER PPP FEU-MCNC: 0.32 UG/ML FEU
EOSINOPHIL # BLD AUTO: 0.11 THOUSAND/ΜL (ref 0–0.61)
EOSINOPHIL NFR BLD AUTO: 1 % (ref 0–6)
ERYTHROCYTE [DISTWIDTH] IN BLOOD BY AUTOMATED COUNT: 15.9 % (ref 11.6–15.1)
EXT PREG TEST URINE: NORMAL
EXT. CONTROL ED NAV: NORMAL
GFR SERPL CREATININE-BSD FRML MDRD: 90 ML/MIN/1.73SQ M
GLUCOSE SERPL-MCNC: 99 MG/DL (ref 65–140)
GLUCOSE UR STRIP-MCNC: NEGATIVE MG/DL
HCT VFR BLD AUTO: 44.5 % (ref 34.8–46.1)
HGB BLD-MCNC: 13.2 G/DL (ref 11.5–15.4)
HGB UR QL STRIP.AUTO: ABNORMAL
IMM GRANULOCYTES # BLD AUTO: 0.09 THOUSAND/UL (ref 0–0.2)
IMM GRANULOCYTES NFR BLD AUTO: 1 % (ref 0–2)
INR PPP: 0.92 (ref 0.84–1.19)
KETONES UR STRIP-MCNC: NEGATIVE MG/DL
LEUKOCYTE ESTERASE UR QL STRIP: NEGATIVE
LYMPHOCYTES # BLD AUTO: 2.43 THOUSANDS/ΜL (ref 0.6–4.47)
LYMPHOCYTES NFR BLD AUTO: 16 % (ref 14–44)
MCH RBC QN AUTO: 24.3 PG (ref 26.8–34.3)
MCHC RBC AUTO-ENTMCNC: 29.7 G/DL (ref 31.4–37.4)
MCV RBC AUTO: 82 FL (ref 82–98)
MONOCYTES # BLD AUTO: 1.19 THOUSAND/ΜL (ref 0.17–1.22)
MONOCYTES NFR BLD AUTO: 8 % (ref 4–12)
NEUTROPHILS # BLD AUTO: 11.36 THOUSANDS/ΜL (ref 1.85–7.62)
NEUTS SEG NFR BLD AUTO: 74 % (ref 43–75)
NITRITE UR QL STRIP: NEGATIVE
NON-SQ EPI CELLS URNS QL MICRO: ABNORMAL /HPF
NRBC BLD AUTO-RTO: 0 /100 WBCS
PH UR STRIP.AUTO: 6.5 [PH] (ref 4.5–8)
PLATELET # BLD AUTO: 430 THOUSANDS/UL (ref 149–390)
PMV BLD AUTO: 9.6 FL (ref 8.9–12.7)
POTASSIUM SERPL-SCNC: 3.6 MMOL/L (ref 3.5–5.3)
PROT UR STRIP-MCNC: NEGATIVE MG/DL
PROTHROMBIN TIME: 12.4 SECONDS (ref 11.6–14.5)
RBC # BLD AUTO: 5.43 MILLION/UL (ref 3.81–5.12)
RBC #/AREA URNS AUTO: ABNORMAL /HPF
SODIUM SERPL-SCNC: 137 MMOL/L (ref 136–145)
SP GR UR STRIP.AUTO: 1.02 (ref 1–1.03)
TROPONIN I SERPL-MCNC: <0.02 NG/ML
TROPONIN I SERPL-MCNC: <0.02 NG/ML
UROBILINOGEN UR QL STRIP.AUTO: 0.2 E.U./DL
WBC # BLD AUTO: 15.24 THOUSAND/UL (ref 4.31–10.16)
WBC #/AREA URNS AUTO: ABNORMAL /HPF

## 2020-05-28 PROCEDURE — 85730 THROMBOPLASTIN TIME PARTIAL: CPT | Performed by: EMERGENCY MEDICINE

## 2020-05-28 PROCEDURE — 99285 EMERGENCY DEPT VISIT HI MDM: CPT

## 2020-05-28 PROCEDURE — 36415 COLL VENOUS BLD VENIPUNCTURE: CPT | Performed by: EMERGENCY MEDICINE

## 2020-05-28 PROCEDURE — 81025 URINE PREGNANCY TEST: CPT | Performed by: EMERGENCY MEDICINE

## 2020-05-28 PROCEDURE — 3077F SYST BP >= 140 MM HG: CPT | Performed by: NURSE PRACTITIONER

## 2020-05-28 PROCEDURE — 93005 ELECTROCARDIOGRAM TRACING: CPT

## 2020-05-28 PROCEDURE — 99214 OFFICE O/P EST MOD 30 MIN: CPT | Performed by: NURSE PRACTITIONER

## 2020-05-28 PROCEDURE — 1036F TOBACCO NON-USER: CPT | Performed by: NURSE PRACTITIONER

## 2020-05-28 PROCEDURE — 93000 ELECTROCARDIOGRAM COMPLETE: CPT | Performed by: NURSE PRACTITIONER

## 2020-05-28 PROCEDURE — 80048 BASIC METABOLIC PNL TOTAL CA: CPT | Performed by: EMERGENCY MEDICINE

## 2020-05-28 PROCEDURE — 84484 ASSAY OF TROPONIN QUANT: CPT | Performed by: EMERGENCY MEDICINE

## 2020-05-28 PROCEDURE — 3079F DIAST BP 80-89 MM HG: CPT | Performed by: NURSE PRACTITIONER

## 2020-05-28 PROCEDURE — 99285 EMERGENCY DEPT VISIT HI MDM: CPT | Performed by: EMERGENCY MEDICINE

## 2020-05-28 PROCEDURE — 71046 X-RAY EXAM CHEST 2 VIEWS: CPT

## 2020-05-28 PROCEDURE — 85025 COMPLETE CBC W/AUTO DIFF WBC: CPT | Performed by: EMERGENCY MEDICINE

## 2020-05-28 PROCEDURE — 85379 FIBRIN DEGRADATION QUANT: CPT | Performed by: EMERGENCY MEDICINE

## 2020-05-28 PROCEDURE — 81001 URINALYSIS AUTO W/SCOPE: CPT

## 2020-05-28 PROCEDURE — 85610 PROTHROMBIN TIME: CPT | Performed by: EMERGENCY MEDICINE

## 2020-05-28 RX ORDER — ACETAMINOPHEN 325 MG/1
975 TABLET ORAL ONCE
Status: COMPLETED | OUTPATIENT
Start: 2020-05-28 | End: 2020-05-28

## 2020-05-28 RX ADMIN — ACETAMINOPHEN 975 MG: 325 TABLET, FILM COATED ORAL at 18:31

## 2020-05-29 ENCOUNTER — OFFICE VISIT (OUTPATIENT)
Dept: FAMILY MEDICINE CLINIC | Facility: CLINIC | Age: 30
End: 2020-05-29
Payer: COMMERCIAL

## 2020-05-29 VITALS
DIASTOLIC BLOOD PRESSURE: 108 MMHG | TEMPERATURE: 98.5 F | SYSTOLIC BLOOD PRESSURE: 164 MMHG | HEART RATE: 107 BPM | OXYGEN SATURATION: 97 %

## 2020-05-29 DIAGNOSIS — B08.1 MOLLUSCUM CONTAGIOSUM: ICD-10-CM

## 2020-05-29 DIAGNOSIS — D72.829 LEUKOCYTOSIS, UNSPECIFIED TYPE: ICD-10-CM

## 2020-05-29 DIAGNOSIS — R07.9 CHEST PAIN, UNSPECIFIED TYPE: Primary | ICD-10-CM

## 2020-05-29 DIAGNOSIS — R06.00 DYSPNEA, UNSPECIFIED TYPE: ICD-10-CM

## 2020-05-29 DIAGNOSIS — I10 ESSENTIAL HYPERTENSION: ICD-10-CM

## 2020-05-29 LAB
ATRIAL RATE: 122 BPM
P AXIS: 55 DEGREES
PR INTERVAL: 158 MS
QRS AXIS: 30 DEGREES
QRSD INTERVAL: 78 MS
QT INTERVAL: 290 MS
QTC INTERVAL: 413 MS
T WAVE AXIS: 31 DEGREES
VENTRICULAR RATE: 122 BPM

## 2020-05-29 PROCEDURE — 3077F SYST BP >= 140 MM HG: CPT | Performed by: NURSE PRACTITIONER

## 2020-05-29 PROCEDURE — 93010 ELECTROCARDIOGRAM REPORT: CPT | Performed by: INTERNAL MEDICINE

## 2020-05-29 PROCEDURE — 3080F DIAST BP >= 90 MM HG: CPT | Performed by: NURSE PRACTITIONER

## 2020-05-29 PROCEDURE — 99214 OFFICE O/P EST MOD 30 MIN: CPT | Performed by: NURSE PRACTITIONER

## 2020-05-29 PROCEDURE — 1036F TOBACCO NON-USER: CPT | Performed by: NURSE PRACTITIONER

## 2020-05-29 RX ORDER — ALBUTEROL SULFATE 90 UG/1
2 AEROSOL, METERED RESPIRATORY (INHALATION) EVERY 6 HOURS PRN
Qty: 1 INHALER | Refills: 5 | Status: SHIPPED | OUTPATIENT
Start: 2020-05-29 | End: 2020-12-30

## 2020-05-29 RX ORDER — DOXYCYCLINE HYCLATE 100 MG/1
100 CAPSULE ORAL EVERY 12 HOURS SCHEDULED
Qty: 20 CAPSULE | Refills: 0 | Status: SHIPPED | OUTPATIENT
Start: 2020-05-29 | End: 2020-06-09

## 2020-05-29 RX ORDER — METOPROLOL TARTRATE AND HYDROCHLOROTHIAZIDE 50; 25 MG/1; MG/1
1 TABLET ORAL DAILY
Qty: 90 TABLET | Refills: 1 | Status: SHIPPED | OUTPATIENT
Start: 2020-05-29 | End: 2020-11-30

## 2020-06-06 DIAGNOSIS — K21.9 GASTROESOPHAGEAL REFLUX DISEASE, ESOPHAGITIS PRESENCE NOT SPECIFIED: ICD-10-CM

## 2020-06-06 RX ORDER — PANTOPRAZOLE SODIUM 20 MG/1
20 TABLET, DELAYED RELEASE ORAL
Qty: 30 TABLET | Refills: 2 | Status: SHIPPED | OUTPATIENT
Start: 2020-06-06 | End: 2020-07-07 | Stop reason: SDUPTHER

## 2020-06-09 ENCOUNTER — OFFICE VISIT (OUTPATIENT)
Dept: FAMILY MEDICINE CLINIC | Facility: CLINIC | Age: 30
End: 2020-06-09

## 2020-06-09 VITALS
SYSTOLIC BLOOD PRESSURE: 158 MMHG | WEIGHT: 293 LBS | OXYGEN SATURATION: 98 % | DIASTOLIC BLOOD PRESSURE: 110 MMHG | HEART RATE: 115 BPM | TEMPERATURE: 99.2 F | BODY MASS INDEX: 55.3 KG/M2

## 2020-06-09 DIAGNOSIS — D72.829 LEUKOCYTOSIS, UNSPECIFIED TYPE: ICD-10-CM

## 2020-06-09 DIAGNOSIS — F41.9 ANXIETY: ICD-10-CM

## 2020-06-09 DIAGNOSIS — Z30.9 ENCOUNTER FOR CONTRACEPTIVE MANAGEMENT, UNSPECIFIED TYPE: ICD-10-CM

## 2020-06-09 DIAGNOSIS — R00.0 TACHYCARDIA: Primary | ICD-10-CM

## 2020-06-09 PROCEDURE — 3077F SYST BP >= 140 MM HG: CPT | Performed by: NURSE PRACTITIONER

## 2020-06-09 PROCEDURE — 3080F DIAST BP >= 90 MM HG: CPT | Performed by: NURSE PRACTITIONER

## 2020-06-09 PROCEDURE — 1036F TOBACCO NON-USER: CPT | Performed by: NURSE PRACTITIONER

## 2020-06-09 PROCEDURE — 99214 OFFICE O/P EST MOD 30 MIN: CPT | Performed by: NURSE PRACTITIONER

## 2020-06-09 RX ORDER — LORAZEPAM 0.5 MG/1
0.5 TABLET ORAL 2 TIMES DAILY
Qty: 21 TABLET | Refills: 0 | Status: SHIPPED | OUTPATIENT
Start: 2020-06-09 | End: 2020-08-27 | Stop reason: ALTCHOICE

## 2020-06-09 RX ORDER — FLUOXETINE 10 MG/1
10 CAPSULE ORAL DAILY
Qty: 90 CAPSULE | Refills: 0 | Status: SHIPPED | OUTPATIENT
Start: 2020-06-09 | End: 2020-08-27 | Stop reason: ALTCHOICE

## 2020-06-24 DIAGNOSIS — Z30.41 ENCOUNTER FOR SURVEILLANCE OF CONTRACEPTIVE PILLS: ICD-10-CM

## 2020-06-25 RX ORDER — DROSPIRENONE AND ETHINYL ESTRADIOL 0.02-3(28)
KIT ORAL
Qty: 84 TABLET | Refills: 0 | Status: SHIPPED | OUTPATIENT
Start: 2020-06-25 | End: 2020-07-24

## 2020-06-29 ENCOUNTER — TELEPHONE (OUTPATIENT)
Dept: FAMILY MEDICINE CLINIC | Facility: CLINIC | Age: 30
End: 2020-06-29

## 2020-07-07 DIAGNOSIS — K21.9 GASTROESOPHAGEAL REFLUX DISEASE, ESOPHAGITIS PRESENCE NOT SPECIFIED: ICD-10-CM

## 2020-07-07 RX ORDER — PANTOPRAZOLE SODIUM 20 MG/1
20 TABLET, DELAYED RELEASE ORAL
Qty: 90 TABLET | Refills: 1 | Status: SHIPPED | OUTPATIENT
Start: 2020-07-07 | End: 2020-08-27 | Stop reason: ALTCHOICE

## 2020-07-24 ENCOUNTER — ANNUAL EXAM (OUTPATIENT)
Dept: OBGYN CLINIC | Facility: CLINIC | Age: 30
End: 2020-07-24
Payer: COMMERCIAL

## 2020-07-24 VITALS
HEIGHT: 61 IN | BODY MASS INDEX: 55.32 KG/M2 | TEMPERATURE: 97.9 F | DIASTOLIC BLOOD PRESSURE: 78 MMHG | SYSTOLIC BLOOD PRESSURE: 130 MMHG | WEIGHT: 293 LBS

## 2020-07-24 DIAGNOSIS — Z23 NEED FOR HPV VACCINE: ICD-10-CM

## 2020-07-24 DIAGNOSIS — Z01.419 WELL FEMALE EXAM WITH ROUTINE GYNECOLOGICAL EXAM: Primary | ICD-10-CM

## 2020-07-24 DIAGNOSIS — Z30.41 ENCOUNTER FOR SURVEILLANCE OF CONTRACEPTIVE PILLS: ICD-10-CM

## 2020-07-24 DIAGNOSIS — Z12.4 SCREENING FOR MALIGNANT NEOPLASM OF THE CERVIX: ICD-10-CM

## 2020-07-24 PROCEDURE — 3075F SYST BP GE 130 - 139MM HG: CPT | Performed by: OBSTETRICS & GYNECOLOGY

## 2020-07-24 PROCEDURE — S0612 ANNUAL GYNECOLOGICAL EXAMINA: HCPCS | Performed by: OBSTETRICS & GYNECOLOGY

## 2020-07-24 PROCEDURE — 3078F DIAST BP <80 MM HG: CPT | Performed by: OBSTETRICS & GYNECOLOGY

## 2020-07-24 PROCEDURE — 90651 9VHPV VACCINE 2/3 DOSE IM: CPT

## 2020-07-24 PROCEDURE — 87624 HPV HI-RISK TYP POOLED RSLT: CPT | Performed by: OBSTETRICS & GYNECOLOGY

## 2020-07-24 PROCEDURE — 90471 IMMUNIZATION ADMIN: CPT

## 2020-07-24 PROCEDURE — G0145 SCR C/V CYTO,THINLAYER,RESCR: HCPCS | Performed by: OBSTETRICS & GYNECOLOGY

## 2020-07-24 RX ORDER — DROSPIRENONE AND ETHINYL ESTRADIOL 0.02-3(28)
1 KIT ORAL DAILY
Qty: 84 TABLET | Refills: 0 | Status: SHIPPED | OUTPATIENT
Start: 2020-07-24 | End: 2020-10-16 | Stop reason: SDUPTHER

## 2020-07-25 NOTE — PROGRESS NOTES
ASSESSMENT & PLAN: Manuela Wilson is a 27 y o  Michelle Humphries with normal gynecologic exam     1   Routine well woman exam done today  2    Pap and HPV:Pap with HPV was done today  Current ASCCP Guidelines reviewed  Last Pap  2019 :  low-grade squamous intraepithelial neoplasia (LGSIL - encompassing HPV,mild dysplasia,SYLVIE I)  3   The patient declined STD testing  Safe sex practices have been discussed  4  The patient is sexually active  She is using OCPs for contraception and options have been discussed  BP currently controlled  Return in 3 months to reeval OCP use - recommend IUD    5  BMI 59 - reviewed with pt the critical importance of weight loss - multiple co morbidities, recommend against preg without weight loss, recommend IUD, recommend referral to bariatrics - declined  6  The following were reviewed in today's visit: breast self exam, STD testing, use and side effects of OCPs, family planning choices, adequate intake of calcium and vitamin D, exercise and healthy diet  7  Patient to return to office in 12 months for annual      All questions have been answered to her satisfaction  CC:  Annual Gynecologic Examination    HPI: Manuela Wilson is a 27 y o  Michelle Humphries who presents for annual gynecologic examination  She has the following concerns:  Weight, irreg spotting    Health Maintenance:    She exercises 0 days per week  She wears her seatbelt routinely  She does not perform regular monthly self breast exams  She feels safe at home  Patients does not follow a reg diet  Past Medical History:   Diagnosis Date    Abnormal Pap smear of cervix     Anxiety     Eating disorder     Gallstone     Genital herpes     HPV (human papilloma virus) infection     Hypertension     Varicella     imm       Past Surgical History:   Procedure Laterality Date     SECTION      COLPOSCOPY      NH  DELIVERY ONLY N/A 2019    Procedure:  SECTION ();   Surgeon: Ernestine Ballesteros Angelo Brower MD;  Location: [facility];  Service: Obstetrics       Past OB/Gyn History:  Period Duration (Days): 4  Period Pattern: (!) Irregular  Menstrual Flow: Light  Menstrual Control: Thin pad  Dysmenorrhea: (!) Mild  Dysmenorrhea Symptoms: Other (Comment)Patient's last menstrual period was 2020 (exact date)  Menstrual History:  OB History        1    Para   1    Term   1            AB   0    Living   1       SAB        TAB        Ectopic        Multiple   0    Live Births   1           Obstetric Comments     Menarche 13              History of sexually transmitted infection No  Patient is currently sexually active  heterosexual Birth control: combination OCPs  Last Pap  2019 :  low-grade squamous intraepithelial neoplasia (LGSIL - encompassing HPV,mild dysplasia,SYLVIE I)      Family History   Problem Relation Age of Onset    Hypertension Mother     Hyperlipidemia Mother     Asthma Mother     Mental illness Mother    Demetrice Shah / Joshibouti Mother     Lung cancer Mother     Liver cancer Mother     Brain cancer Mother     Hyperlipidemia Maternal Aunt     Early death Maternal Aunt     Brain cancer Maternal Aunt     Lung cancer Maternal Aunt     No Known Problems Father     Drug abuse Brother     Mental illness Brother     Hypertension Maternal Grandmother     Diabetes Maternal Grandmother         type 2    Hearing loss Maternal Grandmother     Early death Maternal Grandmother     Stroke Maternal Grandmother     Cervical cancer Maternal Grandmother     No Known Problems Daughter        Social History:  Social History     Socioeconomic History    Marital status: Single     Spouse name: Radha Flowers    Number of children: Not on file    Years of education: [employer]    Highest education level: Not on file   Occupational History    Occupation: MA   Social Needs    Financial resource strain: Not on file    Food insecurity:     Worry: Not on file     Inability: Not on file   [employer] needs:     Medical: Not on file     Non-medical: Not on file   Tobacco Use    Smoking status: Never Smoker    Smokeless tobacco: Never Used   Substance and Sexual Activity    Alcohol use: Not Currently    Drug use: Never    Sexual activity: Yes     Partners: Male     Birth control/protection: OCP   Lifestyle    Physical activity:     Days per week: Not on file     Minutes per session: Not on file    Stress: Not on file   Relationships    Social connections:     Talks on phone: Not on file     Gets together: Not on file     Attends Nondenominational service: Not on file     Active member of club or organization: Not on file     Attends meetings of clubs or organizations: Not on file     Relationship status: Not on file    Intimate partner violence:     Fear of current or ex partner: Not on file     Emotionally abused: Not on file     Physically abused: Not on file     Forced sexual activity: Not on file   Other Topics Concern    Not on file   Social History Narrative    Not on file     Presently lives with her family  Patient is co-habitating    Patient is currently unemployed   Allergies   Allergen Reactions    Erythromycin Shortness Of Breath    Penicillins Hives and Other (See Comments)     Mom told pt she had reaction when she was an infant, but can have Amoxicillin      Medical Tape Rash       Current Outpatient Medications:     drospirenone-ethinyl estradiol (TATY) 3-0 02 MG per tablet, Take 1 tablet by mouth daily, Disp: 84 tablet, Rfl: 0    metoprolol-hydrochlorothiazide (LOPRESSOR HCT) 50-25 MG per tablet, Take 1 tablet by mouth daily, Disp: 90 tablet, Rfl: 1    multivitamin (THERAGRAN) TABS, Take 1 tablet by mouth daily, Disp: , Rfl:     pantoprazole (PROTONIX) 20 mg tablet, TAKE 1 TABLET (20 MG TOTAL) BY MOUTH DAILY BEFORE BREAKFAST, Disp: 90 tablet, Rfl: 1    albuterol (PROVENTIL HFA,VENTOLIN HFA) 90 mcg/act inhaler, Inhale 2 puffs every 6 (six) hours as needed for wheezing or shortness of breath, Disp: 1 Inhaler, Rfl: 5    FLUoxetine (PROzac) 10 mg capsule, Take 1 capsule (10 mg total) by mouth daily, Disp: 90 capsule, Rfl: 0    LORazepam (ATIVAN) 0 5 mg tablet, Take 1 tablet (0 5 mg total) by mouth 2 (two) times a day, Disp: 21 tablet, Rfl: 0    Review of Systems:  Review of Systems   Constitutional: Negative  HENT: Negative  Respiratory: Negative  Cardiovascular: Negative  Gastrointestinal: Negative  Genitourinary: Positive for vaginal bleeding  Negative for pelvic pain, vaginal discharge and vaginal pain  Neurological: Negative  Physical Exam:  /78   Temp 97 9 °F (36 6 °C)   Ht 5' 1" (1 549 m)   Wt (!) 144 kg (316 lb 9 6 oz)   LMP 07/21/2020 (Exact Date)   BMI 59 82 kg/m²    Physical Exam   Constitutional: She is oriented to person, place, and time  She appears well-developed and well-nourished  No distress  Genitourinary: Vagina normal    There is lesion on the right labia  There is no rash, tenderness or injury on the right labia  There is lesion on the left labia  There is no rash, tenderness or injury on the left labia  Vagina exhibits rugosity  No tenderness or bleeding in the vagina  No vaginal discharge found  Right adnexum non-palpable  Left adnexum non-palpable  Cervix is nulliparous  Cervix exhibits pinkness  Cervix does not exhibit motion tenderness, discharge or polyp  Genitourinary Comments: No bladder tenderness  Urethral meatus midline, no masses  Appears to have mollescum   HENT:   Head: Normocephalic and atraumatic  Eyes: Pupils are equal, round, and reactive to light  Conjunctivae and EOM are normal    Neck: Normal range of motion  Neck supple  Cardiovascular: Normal rate, regular rhythm and normal heart sounds  No murmur heard  Pulmonary/Chest: Effort normal and breath sounds normal  No stridor  No respiratory distress  She has no wheezes   Right breast exhibits no inverted nipple, no mass, no nipple discharge, no skin change and no tenderness  Left breast exhibits no inverted nipple, no mass, no nipple discharge, no skin change and no tenderness  Abdominal: Soft  Neurological: She is alert and oriented to person, place, and time  Skin: Skin is warm and dry  She is not diaphoretic  No erythema  No pallor  Nursing note and vitals reviewed

## 2020-07-30 LAB
HPV HR 12 DNA CVX QL NAA+PROBE: NEGATIVE
HPV16 DNA CVX QL NAA+PROBE: NEGATIVE
HPV18 DNA CVX QL NAA+PROBE: NEGATIVE

## 2020-07-31 LAB
LAB AP GYN PRIMARY INTERPRETATION: NORMAL
Lab: NORMAL

## 2020-08-01 NOTE — RESULT ENCOUNTER NOTE
Normal pap, neg HPV  Per ASCCP guidelines repeat pap smear in 3 years  Dr Taye Perdue patient  MyChart message sent

## 2020-08-21 DIAGNOSIS — I10 ESSENTIAL HYPERTENSION: ICD-10-CM

## 2020-08-22 RX ORDER — METOPROLOL TARTRATE AND HYDROCHLOROTHIAZIDE 50; 25 MG/1; MG/1
TABLET ORAL
Qty: 90 TABLET | Refills: 2 | OUTPATIENT
Start: 2020-08-22

## 2020-08-27 ENCOUNTER — OFFICE VISIT (OUTPATIENT)
Dept: FAMILY MEDICINE CLINIC | Facility: CLINIC | Age: 30
End: 2020-08-27

## 2020-08-27 VITALS
HEART RATE: 113 BPM | OXYGEN SATURATION: 98 % | SYSTOLIC BLOOD PRESSURE: 138 MMHG | DIASTOLIC BLOOD PRESSURE: 86 MMHG | HEIGHT: 62 IN | WEIGHT: 293 LBS | TEMPERATURE: 98.4 F | BODY MASS INDEX: 53.92 KG/M2

## 2020-08-27 DIAGNOSIS — R35.1 NOCTURIA: ICD-10-CM

## 2020-08-27 DIAGNOSIS — K21.9 GASTROESOPHAGEAL REFLUX DISEASE WITHOUT ESOPHAGITIS: ICD-10-CM

## 2020-08-27 DIAGNOSIS — N92.6 MENSES, IRREGULAR: ICD-10-CM

## 2020-08-27 DIAGNOSIS — Z00.00 PHYSICAL EXAM: ICD-10-CM

## 2020-08-27 DIAGNOSIS — Z13.220 SCREENING CHOLESTEROL LEVEL: ICD-10-CM

## 2020-08-27 DIAGNOSIS — I10 ESSENTIAL HYPERTENSION: Primary | ICD-10-CM

## 2020-08-27 DIAGNOSIS — F41.9 ANXIETY: ICD-10-CM

## 2020-08-27 PROBLEM — Z87.19 HISTORY OF GALLSTONES: Status: RESOLVED | Noted: 2018-12-12 | Resolved: 2020-08-27

## 2020-08-27 LAB
ALBUMIN SERPL BCP-MCNC: 2.8 G/DL (ref 3.5–5)
ALP SERPL-CCNC: 82 U/L (ref 46–116)
ALT SERPL W P-5'-P-CCNC: 18 U/L (ref 12–78)
ANION GAP SERPL CALCULATED.3IONS-SCNC: 7 MMOL/L (ref 4–13)
AST SERPL W P-5'-P-CCNC: 9 U/L (ref 5–45)
BASOPHILS # BLD AUTO: 0.05 THOUSANDS/ΜL (ref 0–0.1)
BASOPHILS NFR BLD AUTO: 0 % (ref 0–1)
BILIRUB SERPL-MCNC: 0.16 MG/DL (ref 0.2–1)
BUN SERPL-MCNC: 12 MG/DL (ref 5–25)
CALCIUM SERPL-MCNC: 8.8 MG/DL (ref 8.3–10.1)
CHLORIDE SERPL-SCNC: 107 MMOL/L (ref 100–108)
CHOLEST SERPL-MCNC: 226 MG/DL (ref 50–200)
CO2 SERPL-SCNC: 27 MMOL/L (ref 21–32)
CREAT SERPL-MCNC: 0.74 MG/DL (ref 0.6–1.3)
EOSINOPHIL # BLD AUTO: 0.12 THOUSAND/ΜL (ref 0–0.61)
EOSINOPHIL NFR BLD AUTO: 1 % (ref 0–6)
ERYTHROCYTE [DISTWIDTH] IN BLOOD BY AUTOMATED COUNT: 16.1 % (ref 11.6–15.1)
EST. AVERAGE GLUCOSE BLD GHB EST-MCNC: 128 MG/DL
GFR SERPL CREATININE-BSD FRML MDRD: 109 ML/MIN/1.73SQ M
GLUCOSE P FAST SERPL-MCNC: 111 MG/DL (ref 65–99)
HBA1C MFR BLD: 6.1 %
HCT VFR BLD AUTO: 42.8 % (ref 34.8–46.1)
HDLC SERPL-MCNC: 67 MG/DL
HGB BLD-MCNC: 12.9 G/DL (ref 11.5–15.4)
IMM GRANULOCYTES # BLD AUTO: 0.11 THOUSAND/UL (ref 0–0.2)
IMM GRANULOCYTES NFR BLD AUTO: 1 % (ref 0–2)
LDLC SERPL CALC-MCNC: 126 MG/DL (ref 0–100)
LYMPHOCYTES # BLD AUTO: 1.6 THOUSANDS/ΜL (ref 0.6–4.47)
LYMPHOCYTES NFR BLD AUTO: 14 % (ref 14–44)
MCH RBC QN AUTO: 25 PG (ref 26.8–34.3)
MCHC RBC AUTO-ENTMCNC: 30.1 G/DL (ref 31.4–37.4)
MCV RBC AUTO: 83 FL (ref 82–98)
MONOCYTES # BLD AUTO: 0.85 THOUSAND/ΜL (ref 0.17–1.22)
MONOCYTES NFR BLD AUTO: 7 % (ref 4–12)
NEUTROPHILS # BLD AUTO: 9.11 THOUSANDS/ΜL (ref 1.85–7.62)
NEUTS SEG NFR BLD AUTO: 77 % (ref 43–75)
NRBC BLD AUTO-RTO: 0 /100 WBCS
PLATELET # BLD AUTO: 387 THOUSANDS/UL (ref 149–390)
PMV BLD AUTO: 10.7 FL (ref 8.9–12.7)
POTASSIUM SERPL-SCNC: 3.6 MMOL/L (ref 3.5–5.3)
PROT SERPL-MCNC: 7.4 G/DL (ref 6.4–8.2)
RBC # BLD AUTO: 5.15 MILLION/UL (ref 3.81–5.12)
SL AMB  POCT GLUCOSE, UA: NORMAL
SL AMB LEUKOCYTE ESTERASE,UA: NEGATIVE
SL AMB POCT BILIRUBIN,UA: NEGATIVE
SL AMB POCT BLOOD,UA: ABNORMAL
SL AMB POCT CLARITY,UA: CLEAR
SL AMB POCT COLOR,UA: YELLOW
SL AMB POCT KETONES,UA: NEGATIVE
SL AMB POCT NITRITE,UA: NEGATIVE
SL AMB POCT PH,UA: 5
SL AMB POCT SPECIFIC GRAVITY,UA: 1.03
SL AMB POCT URINE HCG: NEGATIVE
SL AMB POCT URINE PROTEIN: ABNORMAL
SL AMB POCT UROBILINOGEN: NORMAL
SODIUM SERPL-SCNC: 141 MMOL/L (ref 136–145)
TRIGL SERPL-MCNC: 167 MG/DL
TSH SERPL DL<=0.05 MIU/L-ACNC: 1.84 UIU/ML (ref 0.36–3.74)
WBC # BLD AUTO: 11.84 THOUSAND/UL (ref 4.31–10.16)

## 2020-08-27 PROCEDURE — 81025 URINE PREGNANCY TEST: CPT | Performed by: NURSE PRACTITIONER

## 2020-08-27 PROCEDURE — 3725F SCREEN DEPRESSION PERFORMED: CPT | Performed by: NURSE PRACTITIONER

## 2020-08-27 PROCEDURE — 3079F DIAST BP 80-89 MM HG: CPT | Performed by: NURSE PRACTITIONER

## 2020-08-27 PROCEDURE — 3008F BODY MASS INDEX DOCD: CPT | Performed by: NURSE PRACTITIONER

## 2020-08-27 PROCEDURE — 85025 COMPLETE CBC W/AUTO DIFF WBC: CPT | Performed by: NURSE PRACTITIONER

## 2020-08-27 PROCEDURE — 87147 CULTURE TYPE IMMUNOLOGIC: CPT | Performed by: NURSE PRACTITIONER

## 2020-08-27 PROCEDURE — 81002 URINALYSIS NONAUTO W/O SCOPE: CPT | Performed by: NURSE PRACTITIONER

## 2020-08-27 PROCEDURE — 83036 HEMOGLOBIN GLYCOSYLATED A1C: CPT | Performed by: NURSE PRACTITIONER

## 2020-08-27 PROCEDURE — 84443 ASSAY THYROID STIM HORMONE: CPT | Performed by: NURSE PRACTITIONER

## 2020-08-27 PROCEDURE — 87086 URINE CULTURE/COLONY COUNT: CPT | Performed by: NURSE PRACTITIONER

## 2020-08-27 PROCEDURE — 3075F SYST BP GE 130 - 139MM HG: CPT | Performed by: NURSE PRACTITIONER

## 2020-08-27 PROCEDURE — 99395 PREV VISIT EST AGE 18-39: CPT | Performed by: NURSE PRACTITIONER

## 2020-08-27 PROCEDURE — 1036F TOBACCO NON-USER: CPT | Performed by: NURSE PRACTITIONER

## 2020-08-27 PROCEDURE — 36415 COLL VENOUS BLD VENIPUNCTURE: CPT | Performed by: NURSE PRACTITIONER

## 2020-08-27 PROCEDURE — 80053 COMPREHEN METABOLIC PANEL: CPT | Performed by: NURSE PRACTITIONER

## 2020-08-27 PROCEDURE — 87186 SC STD MICRODIL/AGAR DIL: CPT | Performed by: NURSE PRACTITIONER

## 2020-08-27 PROCEDURE — 80061 LIPID PANEL: CPT | Performed by: NURSE PRACTITIONER

## 2020-08-27 RX ORDER — OMEPRAZOLE 20 MG/1
20 CAPSULE, DELAYED RELEASE ORAL
Qty: 30 CAPSULE | Refills: 5 | Status: SHIPPED | OUTPATIENT
Start: 2020-08-27 | End: 2021-03-02 | Stop reason: SDUPTHER

## 2020-08-27 NOTE — PROGRESS NOTES
BMI Counseling: Body mass index is 57 98 kg/m²  The BMI is above normal  Nutrition recommendations include reducing portion sizes, decreasing overall calorie intake, 3-5 servings of fruits/vegetables daily, reducing fast food intake, consuming healthier snacks, decreasing soda and/or juice intake, moderation in carbohydrate intake and increasing intake of lean protein  Exercise recommendations include moderate aerobic physical activity for 150 minutes/week  Assessment/Plan:    No problem-specific Assessment & Plan notes found for this encounter  Diagnoses and all orders for this visit:    Physical exam     Essential hypertension, stable  -     CBC and differential  -     Comprehensive metabolic panel  Continue Lopressor HCT  Elevate legs as much as possible  Wear compression stockings as tolerated during the day  BMI 50 0-59 9, adult (HCC)  -     Hemoglobin A1C    Anxiety  -     TSH, 3rd generation with Free T4 reflex  Continue coping skills  Follow-up if anxiety recurs  Screening cholesterol level  -     Lipid Panel with Direct LDL reflex    Gastroesophageal reflux disease without esophagitis  -     omeprazole (PriLOSEC) 20 mg delayed release capsule; Take 1 capsule (20 mg total) by mouth daily before breakfast   Discontinue Protonix  Patient is to call her health insurance if Prilosec not covered to ask which PPI is covered under her plan  Nocturia  -     POCT urine dip  -     Urine culture    Menses, irregular  -     POCT urine HCG        Subjective:      Patient ID: Alma Vallejo is a 27 y o  female  Patient is here for physical exam to follow-up on her elevated blood pressure and anxiety  She did not end up taking Prozac and Ativan prescribed on 06/09/2020  She says that her mood improved with coping skills  Her blood pressure is also better  It was 130/78 at her gyn exam on 07/24/2020  She is concerned the darkening of the skin around her neck    She says she is thirsty all the time and awakens several times to urinate at night  Had 1 episode of incontinence in bed 1 month ago; no further episodes  Her ankles swell during the day  Her periods have been very light  She feels tired all the time  Her GERD has been worse since being without Protonix for the past week  Her insurance would not cover this medication  She is willing to try another PPI  The following portions of the patient's history were reviewed and updated as appropriate: allergies, current medications, past family history, past medical history, past social history, past surgical history and problem list     Review of Systems   Constitutional: Positive for fatigue  Negative for activity change, appetite change, chills and diaphoresis  HENT: Negative for congestion, postnasal drip, rhinorrhea, sinus pressure, sinus pain, sneezing, sore throat and trouble swallowing  Eyes: Negative for visual disturbance  Respiratory: Positive for shortness of breath  Negative for cough and wheezing  Cardiovascular: Positive for palpitations (infereq)  Gastrointestinal: Positive for diarrhea  Negative for abdominal distention, abdominal pain, blood in stool, constipation, nausea and vomiting  Endocrine: Positive for heat intolerance, polydipsia, polyphagia and polyuria  Negative for cold intolerance  Genitourinary: Positive for menstrual problem (lighter than usual)  Negative for difficulty urinating, dysuria and urgency  Musculoskeletal: Positive for arthralgias  Negative for gait problem and neck pain  Neurological: Negative for dizziness, speech difficulty, weakness, light-headedness and headaches  Psychiatric/Behavioral: Negative for dysphoric mood, sleep disturbance and suicidal ideas  The patient is not nervous/anxious            Objective:      /86 (BP Location: Left arm, Patient Position: Sitting, Cuff Size: Large)   Pulse (!) 113   Temp 98 4 °F (36 9 °C) (Temporal)   Ht 5' 2" (1 575 m)   Wt (!) 144 kg (317 lb)   SpO2 98%   BMI 57 98 kg/m²          Physical Exam  Constitutional:       Appearance: Normal appearance  She is obese  HENT:      Head: Normocephalic  Right Ear: Tympanic membrane, ear canal and external ear normal       Left Ear: Tympanic membrane, ear canal and external ear normal       Mouth/Throat:      Mouth: Mucous membranes are moist    Eyes:      Extraocular Movements: Extraocular movements intact  Conjunctiva/sclera: Conjunctivae normal       Pupils: Pupils are equal, round, and reactive to light  Neck:      Musculoskeletal: Normal range of motion  Cardiovascular:      Rate and Rhythm: Normal rate and regular rhythm  Pulses: Normal pulses  Heart sounds: Normal heart sounds  No murmur  Pulmonary:      Effort: Pulmonary effort is normal       Breath sounds: Normal breath sounds  Abdominal:      General: Bowel sounds are normal       Palpations: Abdomen is soft  There is no mass  Tenderness: There is no abdominal tenderness  There is no guarding  Musculoskeletal: Normal range of motion  Right lower leg: No edema  Left lower leg: No edema  Skin:     General: Skin is warm and dry  Comments: Silky dark skin at the base of her neck   Neurological:      General: No focal deficit present  Mental Status: She is alert and oriented to person, place, and time  Psychiatric:         Mood and Affect: Mood normal          Behavior: Behavior normal          Thought Content:  Thought content normal          Judgment: Judgment normal

## 2020-08-28 DIAGNOSIS — E66.01 MORBID OBESITY (HCC): Primary | ICD-10-CM

## 2020-08-28 DIAGNOSIS — N30.00 ACUTE CYSTITIS WITHOUT HEMATURIA: ICD-10-CM

## 2020-08-28 DIAGNOSIS — R73.03 PRE-DIABETES: ICD-10-CM

## 2020-08-28 RX ORDER — NITROFURANTOIN 25; 75 MG/1; MG/1
100 CAPSULE ORAL 2 TIMES DAILY
Qty: 10 CAPSULE | Refills: 0 | Status: SHIPPED | OUTPATIENT
Start: 2020-08-28 | End: 2020-09-02

## 2020-08-30 LAB
BACTERIA UR CULT: ABNORMAL
BACTERIA UR CULT: ABNORMAL

## 2020-09-01 PROBLEM — R73.03 PRE-DIABETES: Status: ACTIVE | Noted: 2020-09-01

## 2020-09-01 PROBLEM — L83 ACANTHOSIS NIGRICANS: Status: ACTIVE | Noted: 2020-09-01

## 2020-10-16 DIAGNOSIS — Z30.41 ENCOUNTER FOR SURVEILLANCE OF CONTRACEPTIVE PILLS: ICD-10-CM

## 2020-10-22 RX ORDER — DROSPIRENONE AND ETHINYL ESTRADIOL 0.02-3(28)
KIT ORAL
Qty: 84 TABLET | Refills: 0 | Status: SHIPPED | OUTPATIENT
Start: 2020-10-22 | End: 2020-12-30 | Stop reason: SDUPTHER

## 2020-10-22 RX ORDER — DROSPIRENONE AND ETHINYL ESTRADIOL 0.02-3(28)
1 KIT ORAL DAILY
Qty: 84 TABLET | Refills: 0 | Status: SHIPPED | OUTPATIENT
Start: 2020-10-22 | End: 2021-03-02 | Stop reason: SDUPTHER

## 2020-10-22 NOTE — TELEPHONE ENCOUNTER
Because of her multiple risk factors, I am uncomfortable prescribing estrogen to her  I talked to Ibrahima Cortes who indicated that she is also unlikely to be willing to do this, either  I am not sure if anyone else in our practice would - I suspect not  I did review her case with Mark Anthony Cordon (practice administrator) as well    I would be happy to see her to discuss other options like progesterone only pills, depo, mirena or kyleena, or nexplanon within normal limits

## 2020-10-26 ENCOUNTER — CLINICAL SUPPORT (OUTPATIENT)
Dept: FAMILY MEDICINE CLINIC | Facility: CLINIC | Age: 30
End: 2020-10-26

## 2020-10-26 ENCOUNTER — TELEPHONE (OUTPATIENT)
Dept: FAMILY MEDICINE CLINIC | Facility: CLINIC | Age: 30
End: 2020-10-26

## 2020-10-26 DIAGNOSIS — R39.9 UTI SYMPTOMS: Primary | ICD-10-CM

## 2020-10-26 DIAGNOSIS — Z92.89 HISTORY OF URINE CULTURE: Primary | ICD-10-CM

## 2020-10-26 LAB
BACTERIA UR QL AUTO: NORMAL /HPF
BILIRUB UR QL STRIP: NEGATIVE
CLARITY UR: CLEAR
COLOR UR: YELLOW
GLUCOSE UR STRIP-MCNC: NEGATIVE MG/DL
HGB UR QL STRIP.AUTO: ABNORMAL
HYALINE CASTS #/AREA URNS LPF: NORMAL /LPF
KETONES UR STRIP-MCNC: NEGATIVE MG/DL
LEUKOCYTE ESTERASE UR QL STRIP: NEGATIVE
NITRITE UR QL STRIP: NEGATIVE
NON-SQ EPI CELLS URNS QL MICRO: NORMAL /HPF
PH UR STRIP.AUTO: 5.5 [PH]
PROT UR STRIP-MCNC: NEGATIVE MG/DL
RBC #/AREA URNS AUTO: NORMAL /HPF
SP GR UR STRIP.AUTO: 1.02 (ref 1–1.03)
UROBILINOGEN UR QL STRIP.AUTO: 0.2 E.U./DL
WBC #/AREA URNS AUTO: NORMAL /HPF

## 2020-10-26 PROCEDURE — 81001 URINALYSIS AUTO W/SCOPE: CPT | Performed by: FAMILY MEDICINE

## 2020-10-27 ENCOUNTER — APPOINTMENT (EMERGENCY)
Dept: RADIOLOGY | Facility: HOSPITAL | Age: 30
End: 2020-10-27
Payer: COMMERCIAL

## 2020-10-27 ENCOUNTER — HOSPITAL ENCOUNTER (EMERGENCY)
Facility: HOSPITAL | Age: 30
Discharge: HOME | End: 2020-10-27
Attending: EMERGENCY MEDICINE | Admitting: EMERGENCY MEDICINE
Payer: COMMERCIAL

## 2020-10-27 VITALS
DIASTOLIC BLOOD PRESSURE: 73 MMHG | HEART RATE: 90 BPM | RESPIRATION RATE: 18 BRPM | WEIGHT: 293 LBS | BODY MASS INDEX: 55.32 KG/M2 | TEMPERATURE: 98.6 F | OXYGEN SATURATION: 98 % | HEIGHT: 61 IN | SYSTOLIC BLOOD PRESSURE: 146 MMHG

## 2020-10-27 DIAGNOSIS — S93.401A RIGHT ANKLE SPRAIN: Primary | ICD-10-CM

## 2020-10-27 DIAGNOSIS — S93.601A SPRAIN OF RIGHT FOOT, INITIAL ENCOUNTER: ICD-10-CM

## 2020-10-27 LAB
EXT PREG TEST URINE: NEGATIVE
EXT. CONTROL ED NAV: NORMAL

## 2020-10-27 PROCEDURE — 81025 URINE PREGNANCY TEST: CPT | Performed by: PHYSICIAN ASSISTANT

## 2020-10-27 PROCEDURE — 99284 EMERGENCY DEPT VISIT MOD MDM: CPT

## 2020-10-27 PROCEDURE — 99284 EMERGENCY DEPT VISIT MOD MDM: CPT | Performed by: PHYSICIAN ASSISTANT

## 2020-10-27 PROCEDURE — 73610 X-RAY EXAM OF ANKLE: CPT

## 2020-10-27 PROCEDURE — 96372 THER/PROPH/DIAG INJ SC/IM: CPT

## 2020-10-27 PROCEDURE — 73630 X-RAY EXAM OF FOOT: CPT

## 2020-10-27 RX ORDER — IBUPROFEN 600 MG/1
600 TABLET ORAL EVERY 6 HOURS PRN
Qty: 30 TABLET | Refills: 0 | Status: SHIPPED | OUTPATIENT
Start: 2020-10-27 | End: 2021-06-28

## 2020-10-27 RX ORDER — KETOROLAC TROMETHAMINE 30 MG/ML
15 INJECTION, SOLUTION INTRAMUSCULAR; INTRAVENOUS ONCE
Status: COMPLETED | OUTPATIENT
Start: 2020-10-27 | End: 2020-10-27

## 2020-10-27 RX ADMIN — KETOROLAC TROMETHAMINE 15 MG: 30 INJECTION, SOLUTION INTRAMUSCULAR at 08:46

## 2020-10-29 ENCOUNTER — OFFICE VISIT (OUTPATIENT)
Dept: OBGYN CLINIC | Facility: HOSPITAL | Age: 30
End: 2020-10-29
Payer: COMMERCIAL

## 2020-10-29 VITALS
BODY MASS INDEX: 55.32 KG/M2 | HEART RATE: 85 BPM | SYSTOLIC BLOOD PRESSURE: 129 MMHG | WEIGHT: 293 LBS | DIASTOLIC BLOOD PRESSURE: 93 MMHG | HEIGHT: 61 IN

## 2020-10-29 DIAGNOSIS — S93.491A SPRAIN OF ANTERIOR TALOFIBULAR LIGAMENT OF RIGHT ANKLE, INITIAL ENCOUNTER: Primary | ICD-10-CM

## 2020-10-29 PROCEDURE — 99203 OFFICE O/P NEW LOW 30 MIN: CPT | Performed by: PHYSICIAN ASSISTANT

## 2020-11-06 ENCOUNTER — OFFICE VISIT (OUTPATIENT)
Dept: OBGYN CLINIC | Facility: MEDICAL CENTER | Age: 30
End: 2020-11-06
Payer: COMMERCIAL

## 2020-11-06 VITALS
HEIGHT: 62 IN | HEART RATE: 83 BPM | DIASTOLIC BLOOD PRESSURE: 82 MMHG | WEIGHT: 293 LBS | BODY MASS INDEX: 53.92 KG/M2 | SYSTOLIC BLOOD PRESSURE: 121 MMHG

## 2020-11-06 DIAGNOSIS — S93.491D SPRAIN OF ANTERIOR TALOFIBULAR LIGAMENT OF RIGHT ANKLE, SUBSEQUENT ENCOUNTER: Primary | ICD-10-CM

## 2020-11-06 PROCEDURE — 1036F TOBACCO NON-USER: CPT | Performed by: EMERGENCY MEDICINE

## 2020-11-06 PROCEDURE — 3079F DIAST BP 80-89 MM HG: CPT | Performed by: EMERGENCY MEDICINE

## 2020-11-06 PROCEDURE — 99213 OFFICE O/P EST LOW 20 MIN: CPT | Performed by: EMERGENCY MEDICINE

## 2020-11-06 PROCEDURE — 3008F BODY MASS INDEX DOCD: CPT | Performed by: EMERGENCY MEDICINE

## 2020-11-06 PROCEDURE — 3074F SYST BP LT 130 MM HG: CPT | Performed by: EMERGENCY MEDICINE

## 2020-11-30 DIAGNOSIS — I10 ESSENTIAL HYPERTENSION: ICD-10-CM

## 2020-11-30 RX ORDER — METOPROLOL TARTRATE AND HYDROCHLOROTHIAZIDE 50; 25 MG/1; MG/1
TABLET ORAL
Qty: 30 TABLET | Refills: 5 | Status: SHIPPED | OUTPATIENT
Start: 2020-11-30 | End: 2020-12-30 | Stop reason: ALTCHOICE

## 2020-12-18 RX ORDER — METOPROLOL TARTRATE AND HYDROCHLOROTHIAZIDE 50; 25 MG/1; MG/1
TABLET ORAL
Qty: 30 TABLET | Refills: 5 | OUTPATIENT
Start: 2020-12-18

## 2020-12-30 ENCOUNTER — TELEPHONE (OUTPATIENT)
Dept: FAMILY MEDICINE CLINIC | Facility: CLINIC | Age: 30
End: 2020-12-30

## 2020-12-30 ENCOUNTER — OFFICE VISIT (OUTPATIENT)
Dept: FAMILY MEDICINE CLINIC | Facility: CLINIC | Age: 30
End: 2020-12-30
Payer: COMMERCIAL

## 2020-12-30 VITALS
HEART RATE: 96 BPM | SYSTOLIC BLOOD PRESSURE: 162 MMHG | BODY MASS INDEX: 58.53 KG/M2 | TEMPERATURE: 97.5 F | HEIGHT: 62 IN | RESPIRATION RATE: 18 BRPM | OXYGEN SATURATION: 99 % | DIASTOLIC BLOOD PRESSURE: 100 MMHG

## 2020-12-30 DIAGNOSIS — I10 ESSENTIAL HYPERTENSION: Primary | ICD-10-CM

## 2020-12-30 PROCEDURE — 99214 OFFICE O/P EST MOD 30 MIN: CPT | Performed by: FAMILY MEDICINE

## 2020-12-30 RX ORDER — HYDROCHLOROTHIAZIDE 25 MG/1
25 TABLET ORAL 2 TIMES DAILY
Qty: 90 TABLET | Refills: 5 | Status: SHIPPED | OUTPATIENT
Start: 2020-12-30 | End: 2021-03-02 | Stop reason: ALTCHOICE

## 2020-12-30 NOTE — PROGRESS NOTES
Assessment/Plan:     1  Essential hypertension  Assessment & Plan:  Time spent counseling regarding treatment options was 15 of the 25 minutes visit  Recommend continued beta-blocker and hydrochlorothiazide  Side effect profile medication reviewed  Recommend home blood pressure checks  Recommend recheck again in 3 months in office  We discussed weight  Consider follow-up with weight management  Orders:  -     metoprolol tartrate (LOPRESSOR) 25 mg tablet; Take 1 tablet (25 mg total) by mouth every 12 (twelve) hours  -     hydrochlorothiazide (HYDRODIURIL) 25 mg tablet; Take 1 tablet (25 mg total) by mouth 2 (two) times a day    2  BMI 50 0-59 9, adult [facility])  -     Ambulatory referral to Weight Management; Future        Subjective:      Patient ID: Jody Amaral is a 27 y o  female  Here for recheck on hypertension  Patient is generally feeling well  BMI Counseling: Body mass index is 58 53 kg/m²  The BMI is above normal  Nutrition recommendations include decreasing portion sizes  Exercise recommendations include moderate physical activity 150 minutes/week  Depression Screening and Follow-up Plan: Clincally patient does not have depression  No treatment is required  The following portions of the patient's history were reviewed and updated as appropriate: allergies, current medications, past family history, past medical history, past social history, past surgical history, and problem list     Review of Systems   Constitutional: Negative  HENT: Negative  Eyes: Negative  Respiratory: Negative  Cardiovascular: Negative  Gastrointestinal: Negative  Endocrine: Negative  Genitourinary: Negative  Musculoskeletal: Negative  Skin: Negative  Allergic/Immunologic: Negative  Neurological: Negative  Hematological: Negative  Psychiatric/Behavioral: Negative            Objective:      /100 (BP Location: Left arm, Patient Position: Sitting, Cuff Size: Large) Pulse 96   Temp 97 5 °F (36 4 °C)   Resp 18   Ht 5' 2" (1 575 m)   SpO2 99% Comment: Room air  BMI 58 53 kg/m²          Physical Exam

## 2021-01-05 NOTE — ASSESSMENT & PLAN NOTE
Time spent counseling regarding treatment options was 15 of the 25 minutes visit  Recommend continued beta-blocker and hydrochlorothiazide  Side effect profile medication reviewed  Recommend home blood pressure checks  Recommend recheck again in 3 months in office  We discussed weight  Consider follow-up with weight management

## 2021-01-20 ENCOUNTER — TELEPHONE (OUTPATIENT)
Dept: OBGYN CLINIC | Facility: CLINIC | Age: 31
End: 2021-01-20

## 2021-01-20 DIAGNOSIS — L29.3 PERINEAL ITCHING, FEMALE: Primary | ICD-10-CM

## 2021-01-20 RX ORDER — FLUOCINONIDE 0.5 MG/G
OINTMENT TOPICAL 2 TIMES DAILY
Qty: 30 G | Refills: 0 | Status: SHIPPED | OUTPATIENT
Start: 2021-01-20 | End: 2021-06-28

## 2021-01-20 NOTE — TELEPHONE ENCOUNTER
C/o vaginal itching, irritation, redness, swelling, warmth and odor  Symptoms started after use of new laundry detergent  She has already rewashed all her clothes  But would like to know what to use to help with this irritation  Pharmacy updated  Routing to provider for further recommendations

## 2021-02-14 DIAGNOSIS — I10 ESSENTIAL HYPERTENSION: ICD-10-CM

## 2021-03-01 DIAGNOSIS — K21.9 GASTROESOPHAGEAL REFLUX DISEASE WITHOUT ESOPHAGITIS: ICD-10-CM

## 2021-03-02 ENCOUNTER — OFFICE VISIT (OUTPATIENT)
Dept: FAMILY MEDICINE CLINIC | Facility: CLINIC | Age: 31
End: 2021-03-02
Payer: COMMERCIAL

## 2021-03-02 VITALS
BODY MASS INDEX: 50.02 KG/M2 | SYSTOLIC BLOOD PRESSURE: 160 MMHG | WEIGHT: 293 LBS | HEART RATE: 121 BPM | TEMPERATURE: 96.8 F | OXYGEN SATURATION: 92 % | DIASTOLIC BLOOD PRESSURE: 80 MMHG | HEIGHT: 64 IN

## 2021-03-02 DIAGNOSIS — Z30.41 ENCOUNTER FOR SURVEILLANCE OF CONTRACEPTIVE PILLS: ICD-10-CM

## 2021-03-02 DIAGNOSIS — R73.03 PRE-DIABETES: ICD-10-CM

## 2021-03-02 DIAGNOSIS — K21.9 GASTROESOPHAGEAL REFLUX DISEASE WITHOUT ESOPHAGITIS: ICD-10-CM

## 2021-03-02 DIAGNOSIS — I10 ESSENTIAL HYPERTENSION: Primary | ICD-10-CM

## 2021-03-02 DIAGNOSIS — I10 HTN (HYPERTENSION), BENIGN: ICD-10-CM

## 2021-03-02 LAB
ALBUMIN SERPL BCP-MCNC: 3.2 G/DL (ref 3.5–5)
ALP SERPL-CCNC: 94 U/L (ref 46–116)
ALT SERPL W P-5'-P-CCNC: 34 U/L (ref 12–78)
ANION GAP SERPL CALCULATED.3IONS-SCNC: 5 MMOL/L (ref 4–13)
AST SERPL W P-5'-P-CCNC: 17 U/L (ref 5–45)
BASOPHILS # BLD AUTO: 0.06 THOUSANDS/ΜL (ref 0–0.1)
BASOPHILS NFR BLD AUTO: 0 % (ref 0–1)
BILIRUB SERPL-MCNC: 0.31 MG/DL (ref 0.2–1)
BUN SERPL-MCNC: 15 MG/DL (ref 5–25)
CALCIUM ALBUM COR SERPL-MCNC: 10.8 MG/DL (ref 8.3–10.1)
CALCIUM SERPL-MCNC: 10.2 MG/DL (ref 8.3–10.1)
CHLORIDE SERPL-SCNC: 106 MMOL/L (ref 100–108)
CO2 SERPL-SCNC: 29 MMOL/L (ref 21–32)
CREAT SERPL-MCNC: 0.75 MG/DL (ref 0.6–1.3)
EOSINOPHIL # BLD AUTO: 0.07 THOUSAND/ΜL (ref 0–0.61)
EOSINOPHIL NFR BLD AUTO: 1 % (ref 0–6)
ERYTHROCYTE [DISTWIDTH] IN BLOOD BY AUTOMATED COUNT: 16.8 % (ref 11.6–15.1)
GFR SERPL CREATININE-BSD FRML MDRD: 107 ML/MIN/1.73SQ M
GLUCOSE P FAST SERPL-MCNC: 103 MG/DL (ref 65–99)
HCT VFR BLD AUTO: 44 % (ref 34.8–46.1)
HGB BLD-MCNC: 12.8 G/DL (ref 11.5–15.4)
IMM GRANULOCYTES # BLD AUTO: 0.08 THOUSAND/UL (ref 0–0.2)
IMM GRANULOCYTES NFR BLD AUTO: 1 % (ref 0–2)
LYMPHOCYTES # BLD AUTO: 1.48 THOUSANDS/ΜL (ref 0.6–4.47)
LYMPHOCYTES NFR BLD AUTO: 11 % (ref 14–44)
MCH RBC QN AUTO: 24 PG (ref 26.8–34.3)
MCHC RBC AUTO-ENTMCNC: 29.1 G/DL (ref 31.4–37.4)
MCV RBC AUTO: 83 FL (ref 82–98)
MONOCYTES # BLD AUTO: 0.89 THOUSAND/ΜL (ref 0.17–1.22)
MONOCYTES NFR BLD AUTO: 6 % (ref 4–12)
NEUTROPHILS # BLD AUTO: 11.27 THOUSANDS/ΜL (ref 1.85–7.62)
NEUTS SEG NFR BLD AUTO: 81 % (ref 43–75)
NRBC BLD AUTO-RTO: 0 /100 WBCS
PLATELET # BLD AUTO: 398 THOUSANDS/UL (ref 149–390)
PMV BLD AUTO: 10.2 FL (ref 8.9–12.7)
POTASSIUM SERPL-SCNC: 3.7 MMOL/L (ref 3.5–5.3)
PROT SERPL-MCNC: 7.5 G/DL (ref 6.4–8.2)
RBC # BLD AUTO: 5.33 MILLION/UL (ref 3.81–5.12)
SL AMB POCT HEMOGLOBIN AIC: 5.9 (ref ?–6.5)
SODIUM SERPL-SCNC: 140 MMOL/L (ref 136–145)
TSH SERPL DL<=0.05 MIU/L-ACNC: 1.31 UIU/ML (ref 0.36–3.74)
WBC # BLD AUTO: 13.85 THOUSAND/UL (ref 4.31–10.16)

## 2021-03-02 PROCEDURE — 83036 HEMOGLOBIN GLYCOSYLATED A1C: CPT | Performed by: FAMILY MEDICINE

## 2021-03-02 PROCEDURE — 3077F SYST BP >= 140 MM HG: CPT | Performed by: FAMILY MEDICINE

## 2021-03-02 PROCEDURE — 99214 OFFICE O/P EST MOD 30 MIN: CPT | Performed by: FAMILY MEDICINE

## 2021-03-02 PROCEDURE — 84443 ASSAY THYROID STIM HORMONE: CPT | Performed by: FAMILY MEDICINE

## 2021-03-02 PROCEDURE — 3079F DIAST BP 80-89 MM HG: CPT | Performed by: FAMILY MEDICINE

## 2021-03-02 PROCEDURE — 80053 COMPREHEN METABOLIC PANEL: CPT | Performed by: FAMILY MEDICINE

## 2021-03-02 PROCEDURE — 36415 COLL VENOUS BLD VENIPUNCTURE: CPT | Performed by: FAMILY MEDICINE

## 2021-03-02 PROCEDURE — 85025 COMPLETE CBC W/AUTO DIFF WBC: CPT | Performed by: FAMILY MEDICINE

## 2021-03-02 PROCEDURE — 3008F BODY MASS INDEX DOCD: CPT | Performed by: FAMILY MEDICINE

## 2021-03-02 RX ORDER — OMEPRAZOLE 20 MG/1
20 CAPSULE, DELAYED RELEASE ORAL
Qty: 30 CAPSULE | Refills: 5 | Status: SHIPPED | OUTPATIENT
Start: 2021-03-02 | End: 2021-08-30

## 2021-03-02 RX ORDER — VALSARTAN AND HYDROCHLOROTHIAZIDE 160; 12.5 MG/1; MG/1
1 TABLET, FILM COATED ORAL DAILY
Qty: 90 TABLET | Refills: 3 | Status: SHIPPED | OUTPATIENT
Start: 2021-03-02 | End: 2021-04-13 | Stop reason: ALTCHOICE

## 2021-03-02 RX ORDER — OMEPRAZOLE 20 MG/1
20 CAPSULE, DELAYED RELEASE ORAL
Qty: 90 CAPSULE | Refills: 3 | OUTPATIENT
Start: 2021-03-02

## 2021-03-02 NOTE — TELEPHONE ENCOUNTER
Pt called again requesting refill on omeprazole  Pt states that she STOPPED both Metoprolol and HCTZ she stated that it was giving chest pain  Pt stated that she was previously taking the combination medication and was ok on that  Pt has appt this evening with provider and will discuss at that time

## 2021-03-03 RX ORDER — DROSPIRENONE AND ETHINYL ESTRADIOL 0.02-3(28)
1 KIT ORAL DAILY
Qty: 84 TABLET | Refills: 0 | Status: SHIPPED | OUTPATIENT
Start: 2021-03-03 | End: 2021-07-27 | Stop reason: SDUPTHER

## 2021-03-03 NOTE — ASSESSMENT & PLAN NOTE
Patient is still hypertensive  Recommend holding metoprolol and starting valsartan/ hydrochlorothiazide  She is on birth control pills and if she were to become pregnant we would discontinue valsartan  Consider retention stockings as well for occasional edema  Consider cardiology evaluation if needed  Recommend lab testing as well

## 2021-03-03 NOTE — PROGRESS NOTES
Assessment/Plan:  Time spent reviewing treatment options was 20 of the 30 minutes visit  1  Essential hypertension  Assessment & Plan:    Patient is still hypertensive  Recommend holding metoprolol and starting valsartan/ hydrochlorothiazide  She is on birth control pills and if she were to become pregnant we would discontinue valsartan  Consider retention stockings as well for occasional edema  Consider cardiology evaluation if needed  Recommend lab testing as well  Orders:  -     valsartan-hydrochlorothiazide (DIOVAN-HCT) 160-12 5 MG per tablet; Take 1 tablet by mouth daily  -     CBC and differential  -     Comprehensive metabolic panel  -     TSH, 3rd generation with Free T4 reflex  -     POCT hemoglobin A1c    2  HTN (hypertension), benign    3  Pre-diabetes  -     valsartan-hydrochlorothiazide (DIOVAN-HCT) 160-12 5 MG per tablet; Take 1 tablet by mouth daily  -     CBC and differential  -     Comprehensive metabolic panel  -     TSH, 3rd generation with Free T4 reflex  -     POCT hemoglobin A1c    4  Gastroesophageal reflux disease without esophagitis  -     omeprazole (PriLOSEC) 20 mg delayed release capsule; Take 1 capsule (20 mg total) by mouth daily before breakfast        Subjective:      Patient ID: Anna Villagomez is a 27 y o  female  Patient with history of hypertension  She also has acid reflux and needs refill on omeprazole  She would like to make a switch with her medication  She does have occasional edema but has not been taking hydrochlorothiazide  BMI Counseling: Body mass index is 56 kg/m²  The BMI is above normal  Nutrition recommendations include decreasing portion sizes  Exercise recommendations include moderate physical activity 150 minutes/week  Depression Screening and Follow-up Plan: Clincally patient does not have depression  No treatment is required           The following portions of the patient's history were reviewed and updated as appropriate: allergies, current medications, past family history, past medical history, past social history, past surgical history, and problem list     Review of Systems   Constitutional: Negative  HENT: Negative  Eyes: Negative  Respiratory: Negative  Cardiovascular: Negative  Gastrointestinal: Negative  Endocrine: Negative  Genitourinary: Negative  Musculoskeletal: Negative  Skin: Negative  Allergic/Immunologic: Negative  Neurological: Negative  Hematological: Negative  Psychiatric/Behavioral: Negative  Objective:      /80 (BP Location: Left arm, Patient Position: Sitting, Cuff Size: Adult)   Pulse (!) 121   Temp (!) 96 8 °F (36 °C)   Ht 5' 4 17" (1 63 m)   Wt (!) 149 kg (328 lb)   SpO2 92%   BMI 56 00 kg/m²          Physical Exam  Vitals signs reviewed  Constitutional:       Appearance: She is well-developed  HENT:      Head: Normocephalic and atraumatic  Right Ear: External ear normal  Tympanic membrane is not erythematous or bulging  Left Ear: External ear normal  Tympanic membrane is not erythematous or bulging  Nose: Nose normal       Mouth/Throat:      Mouth: No oral lesions  Pharynx: No oropharyngeal exudate  Eyes:      General: No scleral icterus  Right eye: No discharge  Left eye: No discharge  Conjunctiva/sclera: Conjunctivae normal    Neck:      Musculoskeletal: Normal range of motion and neck supple  Thyroid: No thyromegaly  Cardiovascular:      Rate and Rhythm: Normal rate and regular rhythm  Heart sounds: Normal heart sounds  No murmur  No friction rub  No gallop  Pulmonary:      Effort: Pulmonary effort is normal  No respiratory distress  Breath sounds: No wheezing or rales  Chest:      Chest wall: No tenderness  Abdominal:      General: Bowel sounds are normal  There is no distension  Palpations: Abdomen is soft  There is no mass  Tenderness: There is no abdominal tenderness   There is no guarding or rebound  Musculoskeletal: Normal range of motion  General: No tenderness or deformity  Lymphadenopathy:      Cervical: No cervical adenopathy  Skin:     General: Skin is warm and dry  Coloration: Skin is not pale  Findings: No erythema or rash  Neurological:      Mental Status: She is alert and oriented to person, place, and time  Cranial Nerves: No cranial nerve deficit  Motor: No abnormal muscle tone  Coordination: Coordination normal       Deep Tendon Reflexes: Reflexes are normal and symmetric     Psychiatric:         Behavior: Behavior normal

## 2021-03-04 ENCOUNTER — TELEPHONE (OUTPATIENT)
Dept: FAMILY MEDICINE CLINIC | Facility: CLINIC | Age: 31
End: 2021-03-04

## 2021-03-04 DIAGNOSIS — D72.829 LEUKOCYTOSIS, UNSPECIFIED TYPE: ICD-10-CM

## 2021-03-04 DIAGNOSIS — E83.52 HYPERCALCEMIA: Primary | ICD-10-CM

## 2021-03-08 ENCOUNTER — TELEMEDICINE (OUTPATIENT)
Dept: FAMILY MEDICINE CLINIC | Facility: CLINIC | Age: 31
End: 2021-03-08
Payer: COMMERCIAL

## 2021-03-08 DIAGNOSIS — I10 ESSENTIAL HYPERTENSION: ICD-10-CM

## 2021-03-08 DIAGNOSIS — K52.9 GASTROENTERITIS: Primary | ICD-10-CM

## 2021-03-08 DIAGNOSIS — K58.2 IRRITABLE BOWEL SYNDROME WITH BOTH CONSTIPATION AND DIARRHEA: ICD-10-CM

## 2021-03-08 PROCEDURE — 1036F TOBACCO NON-USER: CPT | Performed by: FAMILY MEDICINE

## 2021-03-08 PROCEDURE — 99214 OFFICE O/P EST MOD 30 MIN: CPT | Performed by: FAMILY MEDICINE

## 2021-03-08 RX ORDER — ONDANSETRON HYDROCHLORIDE 8 MG/1
8 TABLET, FILM COATED ORAL EVERY 8 HOURS PRN
Qty: 20 TABLET | Refills: 0 | Status: SHIPPED | OUTPATIENT
Start: 2021-03-08 | End: 2021-06-28

## 2021-03-08 NOTE — PROGRESS NOTES
Virtual Regular Visit      Assessment/Plan:  Patient will call if symptoms persist or worsen or if new symptoms develop  Recommend office re-evaluation if needed  She also needs a note for work  Time spent documenting and providing note for work as well as reviewing treatment options and symptoms was 25 minutes  Problem List Items Addressed This Visit        Digestive    Irritable bowel syndrome with both constipation and diarrhea       Consider gastroenterology evaluation if symptoms persist considering history  Cardiovascular and Mediastinum    Essential hypertension      Recheck blood pressure in 2-3 months  Other Visit Diagnoses     Gastroenteritis    -  Primary    Relevant Medications    ondansetron (ZOFRAN) 8 mg tablet               Reason for visit is   Chief Complaint   Patient presents with    Virtual Regular Visit        Encounter provider Farheen Payment, DO    Provider located at [address] [place] [address] [phone]      Recent Visits  Date Type Provider Dept   03/04/21 Telephone Shlomo Sanyamilka, [facility] [place]   03/02/21 Office Visit Ardella Payment, DO [facility] [address] recent visits within past 7 days and meeting all other requirements     Today's Visits  Date Type Provider Dept   03/08/21 Telemedicine Ardella Payment, DO [facility]   Showing today's visits and meeting all other requirements     Future Appointments  No visits were found meeting these conditions  Showing future appointments within next 150 days and meeting all other requirements        The patient was identified by name and date of birth  Venkat Czech was informed that this is a telemedicine visit and that the visit is being conducted through [facility] [place] and patient was informed that this is not a secure, HIPAA-compliant platform  She agrees to proceed     My office door was closed  No one else was in the room    She acknowledged consent and understanding of privacy and security of the video platform  The patient has agreed to participate and understands they can discontinue the visit at any time  Patient is aware this is a billable service  Subjective  Jody Amaral is a 27 y o  female   For symptoms noted above   Patient with to the stomach upset symptoms and diarrhea over the last 24-48 hours  Symptoms are resolving but she is still nauseous  Denies any fever sweats or chills  No upper respiratory congestion or loss of taste or smell  No else she knows has similar symptoms  Past Medical History:   Diagnosis Date    Abnormal Pap smear of cervix     Anxiety     Eating disorder     Gallstone     Genital herpes     History of gallstones 2018    HPV (human papilloma virus) infection     Hypertension     Varicella     imm       Past Surgical History:   Procedure Laterality Date     SECTION      COLPOSCOPY      [place]  DELIVERY [place] N/A 2019    Procedure:  SECTION ();   Surgeon: Sarika Duvall MD;  Location: [facility];  Service: Obstetrics       Current Outpatient Medications   Medication Sig Dispense Refill    drospirenone-ethinyl estradiol (TATY) 3-0 02 MG per tablet Take 1 tablet by mouth daily 84 tablet 0    fluocinonide (LIDEX) 0 05 % ointment Apply topically 2 (two) times a day (Patient not taking: Reported on 3/2/2021) 30 g 0    ibuprofen (MOTRIN) 600 mg tablet Take 1 tablet (600 mg total) by mouth every 6 (six) hours as needed for mild pain for up to 10 days 30 tablet 0    omeprazole (PriLOSEC) 20 mg delayed release capsule Take 1 capsule (20 mg total) by mouth daily before breakfast 30 capsule 5    ondansetron (ZOFRAN) 8 mg tablet Take 1 tablet (8 mg total) by mouth every 8 (eight) hours as needed for nausea or vomiting 20 tablet 0    valsartan-hydrochlorothiazide (DIOVAN-HCT) 160-12 5 MG per tablet Take 1 tablet by mouth daily 90 tablet 3     No current facility-administered medications for this visit  Allergies   Allergen Reactions    Erythromycin Shortness Of Breath    Penicillins Hives and Other (See Comments)     Mom told pt she had reaction when she was an infant, but can have Amoxicillin      Medical Tape Rash       Review of Systems   Constitutional: Negative  HENT: Negative  Eyes: Negative  Respiratory: Negative  Cardiovascular: Negative  Gastrointestinal:        As noted in HPI   Endocrine: Negative  Genitourinary: Negative  Musculoskeletal: Negative  Skin: Negative  Allergic/Immunologic: Negative  Neurological: Negative  Hematological: Negative  Psychiatric/Behavioral: Negative  Video Exam    There were no vitals filed for this visit  Physical Exam  Vitals signs reviewed  Constitutional:       Appearance: She is well-developed  HENT:      Head: Normocephalic and atraumatic  Right Ear: External ear normal  Tympanic membrane is not erythematous or bulging  Left Ear: External ear normal  Tympanic membrane is not erythematous or bulging  Nose: Nose normal       Mouth/Throat:      Mouth: No oral lesions  Pharynx: No oropharyngeal exudate  Eyes:      General: No scleral icterus  Right eye: No discharge  Left eye: No discharge  Conjunctiva/sclera: Conjunctivae normal    Neck:      Musculoskeletal: Normal range of motion and neck supple  Thyroid: No thyromegaly  Cardiovascular:      Rate and Rhythm: Normal rate and regular rhythm  Heart sounds: Normal heart sounds  No murmur  No friction rub  No gallop  Pulmonary:      Effort: Pulmonary effort is normal  No respiratory distress  Breath sounds: No wheezing or rales  Chest:      Chest wall: No tenderness  Abdominal:      General: Bowel sounds are normal  There is no distension  Palpations: Abdomen is soft  There is no mass  Tenderness: There is no abdominal tenderness   There is no guarding or rebound  Musculoskeletal: Normal range of motion  General: No tenderness or deformity  Lymphadenopathy:      Cervical: No cervical adenopathy  Skin:     General: Skin is warm and dry  Coloration: Skin is not pale  Findings: No erythema or rash  Neurological:      Mental Status: She is alert and oriented to person, place, and time  Cranial Nerves: No cranial nerve deficit  Motor: No abnormal muscle tone  Coordination: Coordination normal       Deep Tendon Reflexes: Reflexes are normal and symmetric  Psychiatric:         Behavior: Behavior normal           I spent 25 minutes with patient today in which greater than 50% of the time was spent in counseling/coordination of care regarding Symptoms noted above      [id number] [place] acknowledges that she has consented to an online visit or consultation  She understands that the online visit is based solely on information provided by her, and that, in the absence of a face-to-face physical evaluation by the physician, the diagnosis she receives is both limited and provisional in terms of accuracy and completeness  This is not intended to replace a full medical face-to-face evaluation by the physician  Christina Cabrera understands and accepts these terms

## 2021-03-08 NOTE — LETTER
March 8, 2021     Patient: Karsten Sprague   YOB: 1990   Date of Visit: 3/8/2021       To Whom it May Concern:    Karsten Sprague is under my professional care  She was seen in my office on 3/8/2021  She may return to work on 3/10/21  If you have any questions or concerns, please don't hesitate to call           Sincerely,          Bettyann Bumpers, DO        CC: No Recipients

## 2021-03-09 ENCOUNTER — TELEPHONE (OUTPATIENT)
Dept: FAMILY MEDICINE CLINIC | Facility: CLINIC | Age: 31
End: 2021-03-09

## 2021-03-09 NOTE — TELEPHONE ENCOUNTER
Received pre auth request for echo  We do not have pt's insurance card scanned in  Called pt to get number to call on back of insurance card  LMOM for pt to call back

## 2021-03-09 NOTE — TELEPHONE ENCOUNTER
Please also ask pt's if she has [facility] health insurance as we are not par with that

## 2021-03-10 ENCOUNTER — TELEPHONE (OUTPATIENT)
Dept: FAMILY MEDICINE CLINIC | Facility: CLINIC | Age: 31
End: 2021-03-10

## 2021-03-10 NOTE — TELEPHONE ENCOUNTER
Patient is still complaining of stomach pain and diarrhea   She states she has a back to work note for tomorrow but feels as though she can not return please advise

## 2021-03-11 NOTE — TELEPHONE ENCOUNTER
Pt calling stating that she is feeling better  Pt wants to know if she can have a note to return to work? Pt doesn't want to wait for Dr Mariaa Mccullough to reply as she needs the note today to go back tomorrow am  Can you advise if we can give pt a return to work note for tomorrow?

## 2021-03-11 NOTE — TELEPHONE ENCOUNTER
Recommend office evaluation or ER evaluation or   Urgent care evaluation if symptoms are persisting

## 2021-03-15 NOTE — TELEPHONE ENCOUNTER
P/c to pt to get insurance phone number  Pt states that she is going to cancel echo through [facility] since she has [employer] insurance and doesn't want to get a big bill  Pt will reschedule with [employer] when she is able  Nothing further needed from our office and we do not need to do pre auth for Echo as pt is going to cancel  Pt will call to cancel echo

## 2021-04-12 ENCOUNTER — TELEPHONE (OUTPATIENT)
Dept: FAMILY MEDICINE CLINIC | Facility: CLINIC | Age: 31
End: 2021-04-12

## 2021-04-12 NOTE — TELEPHONE ENCOUNTER
Pt called back and states that due to her insurance she cannot come in for an appt, she is asking if you can change her medication and "prescribe something stronger" rather that her coming in to the office  Please advise  Thank you

## 2021-04-12 NOTE — TELEPHONE ENCOUNTER
Patient states legs, ankles, & feet are very swollen  Patient doesn't feel Valsartan-Hydrochlorothiazide combination is working  Please advise

## 2021-04-13 ENCOUNTER — OFFICE VISIT (OUTPATIENT)
Dept: FAMILY MEDICINE CLINIC | Facility: CLINIC | Age: 31
End: 2021-04-13
Payer: COMMERCIAL

## 2021-04-13 VITALS
WEIGHT: 293 LBS | TEMPERATURE: 98.4 F | OXYGEN SATURATION: 96 % | HEIGHT: 61 IN | BODY MASS INDEX: 55.32 KG/M2 | HEART RATE: 115 BPM | SYSTOLIC BLOOD PRESSURE: 136 MMHG | DIASTOLIC BLOOD PRESSURE: 82 MMHG

## 2021-04-13 DIAGNOSIS — R71.8 ELEVATED RED BLOOD CELL COUNT: ICD-10-CM

## 2021-04-13 DIAGNOSIS — D72.829 LEUKOCYTOSIS, UNSPECIFIED TYPE: Primary | ICD-10-CM

## 2021-04-13 DIAGNOSIS — N91.2 AMENORRHEA: ICD-10-CM

## 2021-04-13 DIAGNOSIS — R73.03 PRE-DIABETES: ICD-10-CM

## 2021-04-13 DIAGNOSIS — I10 ESSENTIAL HYPERTENSION: ICD-10-CM

## 2021-04-13 LAB — SL AMB POCT URINE HCG: NEGATIVE

## 2021-04-13 PROCEDURE — 3725F SCREEN DEPRESSION PERFORMED: CPT | Performed by: FAMILY MEDICINE

## 2021-04-13 PROCEDURE — 87147 CULTURE TYPE IMMUNOLOGIC: CPT | Performed by: FAMILY MEDICINE

## 2021-04-13 PROCEDURE — 81025 URINE PREGNANCY TEST: CPT | Performed by: FAMILY MEDICINE

## 2021-04-13 PROCEDURE — 87086 URINE CULTURE/COLONY COUNT: CPT | Performed by: FAMILY MEDICINE

## 2021-04-13 PROCEDURE — 1036F TOBACCO NON-USER: CPT | Performed by: FAMILY MEDICINE

## 2021-04-13 PROCEDURE — 99214 OFFICE O/P EST MOD 30 MIN: CPT | Performed by: FAMILY MEDICINE

## 2021-04-13 RX ORDER — VALSARTAN AND HYDROCHLOROTHIAZIDE 160; 25 MG/1; MG/1
1 TABLET ORAL DAILY
Qty: 90 TABLET | Refills: 1 | Status: SHIPPED | OUTPATIENT
Start: 2021-04-13 | End: 2022-05-19

## 2021-04-13 NOTE — ASSESSMENT & PLAN NOTE
Patient is edema is still mildly present  Recommend increasing valsartan / hydrochlorothiazide to 160/25 mg daily  Recommend recheck of blood pressure in 2-3 months

## 2021-04-13 NOTE — PROGRESS NOTES
Assessment/Plan: recommended follow-up with hematology  Card also given for physical therapy as well as Spine and Pain Management  Her weight is likely playing a significant role regarding her chronic back pain  She will call with any new persisting or worsening symptoms  Await urine culture results  Her calcium levels were slightly elevated on last blood testing but she admits to eating Tums often throughout the course of the day period recommended decreasing calcium intake with no extra times  Recommend consideration for follow-up with weight management as well  1  Leukocytosis, unspecified type  Assessment & Plan:    Recommended follow-up with hematology considering chronically elevated WBC count an RBC count  Orders:  -     Ambulatory referral to Hematology / Oncology; Future  -     Urine culture; Future  -     valsartan-hydrochlorothiazide (DIOVAN-HCT) 160-25 MG per tablet; Take 1 tablet by mouth daily    2  Amenorrhea  -     POCT urine HCG    3  BMI 50 0-59 9, adult ([facility] [address] )    4  Essential hypertension  Assessment & Plan:   Patient is edema is still mildly present  Recommend increasing valsartan / hydrochlorothiazide to 160/25 mg daily  Recommend recheck of blood pressure in 2-3 months  5  Pre-diabetes    6  Elevated red blood cell count        Subjective:      Patient ID: Bette Clifton is a 32 y o  female  Patient has chronic fatigue and CBC is abnormal   She also is obese and has chronic low back pain without radiculopathy symptoms  She denies any fevers  She has seen a hematologist but several years ago    She notes she has some mild swelling to the legs but she is on her feet often throughout the course of the day period           The following portions of the patient's history were reviewed and updated as appropriate: allergies, current medications, past family history, past medical history, past social history, past surgical history, and problem list     Review of Systems Constitutional: Negative  HENT: Negative  Eyes: Negative  Respiratory: Negative  Cardiovascular: Positive for leg swelling  Gastrointestinal: Negative  Endocrine: Negative  Genitourinary: Negative  Musculoskeletal: Negative  Skin: Negative  Allergic/Immunologic: Negative  Neurological: Negative  Hematological: Negative  Psychiatric/Behavioral: Negative  Objective:      /82 (BP Location: Left arm, Patient Position: Sitting, Cuff Size: Large)   Pulse (!) 115   Temp 98 4 °F (36 9 °C) (Temporal)   Ht 5' 1" (1 549 m)   Wt (!) 152 kg (335 lb 6 4 oz)   SpO2 96%   BMI 63 37 kg/m²          Physical Exam  Vitals signs reviewed  Constitutional:       Appearance: She is well-developed  HENT:      Head: Normocephalic and atraumatic  Right Ear: External ear normal  Tympanic membrane is not erythematous or bulging  Left Ear: External ear normal  Tympanic membrane is not erythematous or bulging  Nose: Nose normal       Mouth/Throat:      Mouth: No oral lesions  Pharynx: No oropharyngeal exudate  Eyes:      General: No scleral icterus  Right eye: No discharge  Left eye: No discharge  Conjunctiva/sclera: Conjunctivae normal    Neck:      Musculoskeletal: Normal range of motion and neck supple  Thyroid: No thyromegaly  Cardiovascular:      Rate and Rhythm: Normal rate and regular rhythm  Heart sounds: Normal heart sounds  No murmur  No friction rub  No gallop  Pulmonary:      Effort: Pulmonary effort is normal  No respiratory distress  Breath sounds: No wheezing or rales  Chest:      Chest wall: No tenderness  Abdominal:      General: Bowel sounds are normal  There is no distension  Palpations: Abdomen is soft  There is no mass  Tenderness: There is no abdominal tenderness  There is no guarding or rebound  Musculoskeletal: Normal range of motion  General: No tenderness or deformity  Right lower leg: Edema present  Left lower leg: Edema present  Lymphadenopathy:      Cervical: No cervical adenopathy  Skin:     General: Skin is warm and dry  Coloration: Skin is not pale  Findings: No erythema or rash  Neurological:      Mental Status: She is alert and oriented to person, place, and time  Cranial Nerves: No cranial nerve deficit  Motor: No abnormal muscle tone  Coordination: Coordination normal       Deep Tendon Reflexes: Reflexes are normal and symmetric     Psychiatric:         Behavior: Behavior normal

## 2021-04-14 LAB
BACTERIA UR CULT: ABNORMAL
BACTERIA UR CULT: ABNORMAL

## 2021-04-15 DIAGNOSIS — N39.0 URINARY TRACT INFECTION WITHOUT HEMATURIA, SITE UNSPECIFIED: Primary | ICD-10-CM

## 2021-04-15 RX ORDER — AMOXICILLIN AND CLAVULANATE POTASSIUM 875; 125 MG/1; MG/1
1 TABLET, FILM COATED ORAL EVERY 12 HOURS SCHEDULED
Qty: 10 TABLET | Refills: 0 | Status: SHIPPED | OUTPATIENT
Start: 2021-04-15 | End: 2021-04-20

## 2021-04-27 ENCOUNTER — TELEPHONE (OUTPATIENT)
Dept: FAMILY MEDICINE CLINIC | Facility: CLINIC | Age: 31
End: 2021-04-27

## 2021-04-27 DIAGNOSIS — B37.3 VAGINAL YEAST INFECTION: Primary | ICD-10-CM

## 2021-04-27 RX ORDER — FLUCONAZOLE 150 MG/1
150 TABLET ORAL ONCE
Qty: 1 TABLET | Refills: 0 | Status: SHIPPED | OUTPATIENT
Start: 2021-04-27 | End: 2021-04-27

## 2021-04-27 NOTE — TELEPHONE ENCOUNTER
Prescription for Diflucan sent to pharmacy  Recommend office evaluation if symptoms persist or worsen    Or consider follow-up with gynecologist

## 2021-04-27 NOTE — TELEPHONE ENCOUNTER
Patient states that the antibiotic is not working for her UTI and she also has vaginal itching now  Please advise

## 2021-04-29 ENCOUNTER — TELEPHONE (OUTPATIENT)
Dept: FAMILY MEDICINE CLINIC | Facility: CLINIC | Age: 31
End: 2021-04-29

## 2021-04-29 NOTE — TELEPHONE ENCOUNTER
Patient called in to the office and stated to me that you had given her fluconazole 150 Mg tablet #1/no refills  Patient states she is not better and is not bale to see her OB for another 2 weeks  Patient is requesting something that will help her till she can get in [facility]  [facility] [place] and would like a call back stating it was sent to the pharmacy

## 2021-04-30 NOTE — TELEPHONE ENCOUNTER
Pt notified of recommendations  Pt states that she is unable to come in at this time and has a phone call into her GYN  If she needs to be seen she state that [facility] or [facility]gent Care

## 2021-05-03 ENCOUNTER — OFFICE VISIT (OUTPATIENT)
Dept: FAMILY MEDICINE CLINIC | Facility: CLINIC | Age: 31
End: 2021-05-03
Payer: COMMERCIAL

## 2021-05-03 VITALS
WEIGHT: 293 LBS | DIASTOLIC BLOOD PRESSURE: 84 MMHG | HEART RATE: 92 BPM | OXYGEN SATURATION: 95 % | BODY MASS INDEX: 55.32 KG/M2 | TEMPERATURE: 98.2 F | SYSTOLIC BLOOD PRESSURE: 120 MMHG | HEIGHT: 61 IN

## 2021-05-03 DIAGNOSIS — Z11.59 SCREENING FOR VIRAL DISEASE: ICD-10-CM

## 2021-05-03 DIAGNOSIS — L03.115 CELLULITIS OF RIGHT LOWER EXTREMITY: Primary | ICD-10-CM

## 2021-05-03 LAB — SARS-COV-2 RNA RESP QL NAA+PROBE: NEGATIVE

## 2021-05-03 PROCEDURE — 99214 OFFICE O/P EST MOD 30 MIN: CPT | Performed by: FAMILY MEDICINE

## 2021-05-03 PROCEDURE — U0003 INFECTIOUS AGENT DETECTION BY NUCLEIC ACID (DNA OR RNA); SEVERE ACUTE RESPIRATORY SYNDROME CORONAVIRUS 2 (SARS-COV-2) (CORONAVIRUS DISEASE [COVID-19]), AMPLIFIED PROBE TECHNIQUE, MAKING USE OF HIGH THROUGHPUT TECHNOLOGIES AS DESCRIBED BY CMS-2020-01-R: HCPCS | Performed by: FAMILY MEDICINE

## 2021-05-03 PROCEDURE — 3008F BODY MASS INDEX DOCD: CPT | Performed by: FAMILY MEDICINE

## 2021-05-03 PROCEDURE — U0005 INFEC AGEN DETEC AMPLI PROBE: HCPCS | Performed by: FAMILY MEDICINE

## 2021-05-03 RX ORDER — SULFAMETHOXAZOLE AND TRIMETHOPRIM 800; 160 MG/1; MG/1
1 TABLET ORAL 2 TIMES DAILY
Qty: 14 TABLET | Refills: 0 | Status: SHIPPED | OUTPATIENT
Start: 2021-05-03 | End: 2021-05-10

## 2021-05-03 RX ORDER — FLUCONAZOLE 150 MG/1
150 TABLET ORAL ONCE
Qty: 4 TABLET | Refills: 0 | Status: SHIPPED | OUTPATIENT
Start: 2021-05-03 | End: 2021-05-03

## 2021-05-03 NOTE — PROGRESS NOTES
Assessment/Plan:  We discussed treatment options  Recommend starting Bactrim DS  She is due to follow up with gynecologist regarding recurrent vaginal yeast infections  I did give her a prescription for fluconazole to be used especially with being on an antibiotic in the coming weeks  Recommend return to office if any recurrent symptoms or worsening symptoms for possible incision and drainage if needed  Side effect profile of medication reviewed  Time spent coordinating care and reviewing treatment options as well as documentation was 25 minutes  1  Cellulitis of right lower extremity  -     sulfamethoxazole-trimethoprim (BACTRIM DS) 800-160 mg per tablet; Take 1 tablet by mouth 2 (two) times a day for 7 days  -     fluconazole (DIFLUCAN) 150 mg tablet; Take 1 tablet (150 mg total) by mouth once for 1 dose    2  Screening for viral disease  -     Novel Coronavirus (Covid-19),PCR [facility]; Future          Subjective:      Patient ID: Estiven Escoto is a 32 y o  female  Patient states she has had some small amount of redness to the right medial lower inner thigh for the last 1 week  He is painful at times  She states she has had this in the past at the medial thighs bilaterally at times  She denies any fevers  No discharge  BMI Counseling: Body mass index is 63 03 kg/m²  The BMI is above normal  Nutrition recommendations include decreasing portion sizes  Exercise recommendations include moderate physical activity 150 minutes/week  Depression Screening and Follow-up Plan: Clincally patient does not have depression  No treatment is required  The following portions of the patient's history were reviewed and updated as appropriate: allergies, current medications, past family history, past medical history, past social history, past surgical history, and problem list     Review of Systems   Constitutional: Negative  HENT: Negative      Eyes: Negative  Respiratory: Negative  Cardiovascular: Negative  Gastrointestinal: Negative  Endocrine: Negative  Genitourinary: Negative  Musculoskeletal: Negative  Skin: Positive for rash ( as noted in HPI)  Allergic/Immunologic: Negative  Neurological: Negative  Hematological: Negative  Psychiatric/Behavioral: Negative  Objective:      /84 (BP Location: Left arm, Patient Position: Sitting, Cuff Size: Large)   Pulse 92   Temp 98 2 °F (36 8 °C) (Temporal)   Ht 5' 1" (1 549 m)   Wt (!) 151 kg (333 lb 9 6 oz)   SpO2 95%   BMI 63 03 kg/m²          Physical Exam  Vitals signs reviewed  Constitutional:       Appearance: She is well-developed  HENT:      Head: Normocephalic and atraumatic  Right Ear: External ear normal  Tympanic membrane is not erythematous or bulging  Left Ear: External ear normal  Tympanic membrane is not erythematous or bulging  Nose: Nose normal       Mouth/Throat:      Mouth: No oral lesions  Pharynx: No oropharyngeal exudate  Eyes:      General: No scleral icterus  Right eye: No discharge  Left eye: No discharge  Conjunctiva/sclera: Conjunctivae normal    Neck:      Musculoskeletal: Normal range of motion and neck supple  Thyroid: No thyromegaly  Cardiovascular:      Rate and Rhythm: Normal rate and regular rhythm  Heart sounds: Normal heart sounds  No murmur  No friction rub  No gallop  Pulmonary:      Effort: Pulmonary effort is normal  No respiratory distress  Breath sounds: No wheezing or rales  Chest:      Chest wall: No tenderness  Abdominal:      General: Bowel sounds are normal  There is no distension  Palpations: Abdomen is soft  There is no mass  Tenderness: There is no abdominal tenderness  There is no guarding or rebound  Musculoskeletal: Normal range of motion  General: No tenderness or deformity     Lymphadenopathy:      Cervical: No cervical adenopathy  Skin:     General: Skin is warm and dry  Coloration: Skin is not pale  Findings: Erythema (  Right innerThigh with small nodular erythema present, 1 cm in diameter  No discharge ) present  No rash  Neurological:      Mental Status: She is alert and oriented to person, place, and time  Cranial Nerves: No cranial nerve deficit  Motor: No abnormal muscle tone  Coordination: Coordination normal       Deep Tendon Reflexes: Reflexes are normal and symmetric     Psychiatric:         Behavior: Behavior normal

## 2021-05-11 ENCOUNTER — TELEPHONE (OUTPATIENT)
Dept: OBGYN CLINIC | Facility: CLINIC | Age: 31
End: 2021-05-11

## 2021-05-11 NOTE — TELEPHONE ENCOUNTER
Pt  L/m on nurse line, states, "I have questions about a medication that i'm on " Pt   Not seen since 7/24/20

## 2021-05-13 ENCOUNTER — OFFICE VISIT (OUTPATIENT)
Dept: OBGYN CLINIC | Facility: CLINIC | Age: 31
End: 2021-05-13
Payer: COMMERCIAL

## 2021-05-13 VITALS — BODY MASS INDEX: 63.15 KG/M2 | DIASTOLIC BLOOD PRESSURE: 84 MMHG | WEIGHT: 293 LBS | SYSTOLIC BLOOD PRESSURE: 132 MMHG

## 2021-05-13 DIAGNOSIS — N91.2 AMENORRHEA: ICD-10-CM

## 2021-05-13 DIAGNOSIS — A60.04 HERPES SIMPLEX VULVOVAGINITIS: ICD-10-CM

## 2021-05-13 DIAGNOSIS — R35.0 URINARY FREQUENCY: Primary | ICD-10-CM

## 2021-05-13 LAB
SL AMB  POCT GLUCOSE, UA: NEGATIVE
SL AMB LEUKOCYTE ESTERASE,UA: NEGATIVE
SL AMB POCT BILIRUBIN,UA: NEGATIVE
SL AMB POCT BLOOD,UA: NEGATIVE
SL AMB POCT CLARITY,UA: NORMAL
SL AMB POCT COLOR,UA: NORMAL
SL AMB POCT KETONES,UA: NEGATIVE
SL AMB POCT NITRITE,UA: NEGATIVE
SL AMB POCT PH,UA: 5.5
SL AMB POCT SPECIFIC GRAVITY,UA: 1.03
SL AMB POCT URINE HCG: NEGATIVE
SL AMB POCT URINE PROTEIN: NEGATIVE
SL AMB POCT UROBILINOGEN: NEGATIVE

## 2021-05-13 PROCEDURE — 3079F DIAST BP 80-89 MM HG: CPT | Performed by: PHYSICIAN ASSISTANT

## 2021-05-13 PROCEDURE — 3075F SYST BP GE 130 - 139MM HG: CPT | Performed by: PHYSICIAN ASSISTANT

## 2021-05-13 PROCEDURE — 1036F TOBACCO NON-USER: CPT | Performed by: PHYSICIAN ASSISTANT

## 2021-05-13 PROCEDURE — 81003 URINALYSIS AUTO W/O SCOPE: CPT | Performed by: PHYSICIAN ASSISTANT

## 2021-05-13 PROCEDURE — 99213 OFFICE O/P EST LOW 20 MIN: CPT | Performed by: PHYSICIAN ASSISTANT

## 2021-05-13 PROCEDURE — 81025 URINE PREGNANCY TEST: CPT | Performed by: PHYSICIAN ASSISTANT

## 2021-05-13 RX ORDER — VALACYCLOVIR HYDROCHLORIDE 500 MG/1
500 TABLET, FILM COATED ORAL 2 TIMES DAILY
Qty: 10 TABLET | Refills: 0 | Status: SHIPPED | OUTPATIENT
Start: 2021-05-13 | End: 2022-07-13

## 2021-05-13 NOTE — PROGRESS NOTES
Assessment/Plan:  - Reviewed weight loss  - Reviewed amenorrhea likely due to weight, HCG in office negative  - Highly encouraged patient to stop estrogen containing OCP due to weight and HTN  - Discussed risks of stroke, blood clots with combo OCP  - HSV diagnosis, will treat with valtrex       Diagnoses and all orders for this visit:    Urinary frequency  -     POCT urine dip auto non-scope    Amenorrhea  -     POCT urine HCG    Herpes simplex vulvovaginitis  -     valACYclovir (VALTREX) 500 mg tablet; Take 1 tablet (500 mg total) by mouth 2 (two) times a day for 5 days    Other orders  -     Multiple Vitamins-Minerals (MULTIVITAMIN GUMMIES ADULT PO); Take by mouth          Subjective:      Patient ID: Aniyah Evans is a 32 y o  female  Isiah Guthrie is a 27YO  WF presenting to the office with c/o vaginal lesions  Patient reports these lesions are located on her inner right labia and are very painful  She reports a personal hx of HSV  She also notes that she was recently treated for cellulitis and just finished her abx yesterday  She denies vaginal itching or discharge  She reports her PCP gave her an rx for diflucan if she develops those symptoms  Patient reports she is under stress and knows she needs to lose weight  The following portions of the patient's history were reviewed and updated as appropriate: allergies, current medications, past family history, past medical history, past social history, past surgical history and problem list     Review of Systems   Constitutional: Negative for chills, fever and unexpected weight change  Respiratory: Negative for shortness of breath  Cardiovascular: Negative for chest pain  Gastrointestinal: Negative for abdominal pain, diarrhea, nausea and vomiting  Genitourinary: Positive for vaginal pain (at lesions)  Negative for vaginal bleeding and vaginal discharge  Skin: Negative for rash           Objective:      /84 (BP Location: Right arm, Patient Position: Sitting, Cuff Size: Large)   Wt (!) 152 kg (334 lb 3 2 oz)   LMP 03/01/2021 (Approximate)   BMI 63 15 kg/m²          Physical Exam  Vitals signs reviewed  Constitutional:       General: She is not in acute distress  Appearance: Normal appearance  She is obese  HENT:      Head: Normocephalic and atraumatic  Pulmonary:      Effort: Pulmonary effort is normal    Genitourinary:     Pubic Area: No rash  Labia:         Right: Tenderness (over lesions) and lesion (5-6 1-2mm vesicular lesions on an erythematous base located on the medial superior aspect of the right labia majora) present  No rash  Left: No rash or lesion  Skin:     General: Skin is warm and dry  Neurological:      General: No focal deficit present  Mental Status: She is alert     Psychiatric:         Mood and Affect: Mood normal          Behavior: Behavior normal

## 2021-06-16 ENCOUNTER — TELEPHONE (OUTPATIENT)
Dept: FAMILY MEDICINE CLINIC | Facility: CLINIC | Age: 31
End: 2021-06-16

## 2021-06-16 NOTE — TELEPHONE ENCOUNTER
Pt called stating that she needs a script sent to the pharmacy for ammonium lactate cream, originally prescribed for dry cracked skin on her heel  We have not prescribed her this since 2019  She states that she currently does not have insurance and cannot come in for an office visit, she is asking if [facility] can [facility] [place]  Please advise  Thank you

## 2021-06-17 NOTE — TELEPHONE ENCOUNTER
Patient informed she needs to schedule an office visit  Patient stated "she is in-between insurance and can not afford the office visit, and or to see Podiatry"     [facility]

## 2021-06-28 ENCOUNTER — OFFICE VISIT (OUTPATIENT)
Dept: URGENT CARE | Facility: MEDICAL CENTER | Age: 31
End: 2021-06-28

## 2021-06-28 VITALS
HEIGHT: 61 IN | BODY MASS INDEX: 55.32 KG/M2 | DIASTOLIC BLOOD PRESSURE: 92 MMHG | RESPIRATION RATE: 16 BRPM | TEMPERATURE: 98.3 F | OXYGEN SATURATION: 96 % | HEART RATE: 134 BPM | WEIGHT: 293 LBS | SYSTOLIC BLOOD PRESSURE: 140 MMHG

## 2021-06-28 DIAGNOSIS — R06.02 SOB (SHORTNESS OF BREATH): Primary | ICD-10-CM

## 2021-06-28 PROCEDURE — G0383 LEV 4 HOSP TYPE B ED VISIT: HCPCS | Performed by: PHYSICIAN ASSISTANT

## 2021-06-28 RX ORDER — ASPIRIN 81 MG/1
81 TABLET ORAL DAILY
COMMUNITY
End: 2021-08-20 | Stop reason: ALTCHOICE

## 2021-06-28 NOTE — PATIENT INSTRUCTIONS
Dyspnea   WHAT YOU NEED TO KNOW:   Dyspnea is breathing difficulty or discomfort  You may have labored, painful, or shallow breathing  You may feel breathless or short of breath  Dyspnea can occur during rest or with activity  You may have dyspnea for a short time, or it might become chronic  Dyspnea is often a symptom of a disease or condition  DISCHARGE INSTRUCTIONS:   Return to the emergency department if:   · Your signs and symptoms are the same or worse within 24 hours of treatment  · You have shaking chills or a fever over 102°F      · You have new pain, pressure, or tightness in your chest      · You have a new or worse cough or wheezing, or you cough up blood  · You feel like you cannot get enough air  · The skin over your ribs or on your neck sinks in when you breathe  · You have a severe headache with vomiting and abdominal pain  · You feel confused or dizzy  Contact your healthcare provider or specialist if:   · You have questions or concerns about your condition or care  Medicines:   · Medicines  may be used to treat the cause of your dyspnea  Medicines may reduce swelling in your airway or decrease extra fluid from around your heart or lungs  Other medicines may be used to decrease anxiety and help you feel calm and relaxed  · Take your medicine as directed  Contact your healthcare provider if you think your medicine is not helping or if you have side effects  Tell him or her if you are allergic to any medicine  Keep a list of the medicines, vitamins, and herbs you take  Include the amounts, and when and why you take them  Bring the list or the pill bottles to follow-up visits  Carry your medicine list with you in case of an emergency  Manage long-term dyspnea:   · Create an action plan  You and your healthcare provider can work together to create a plan for how to handle episodes of dyspnea   The plan can include daily activities, treatment changes, and what to do if you have severe breathing problems  · Lean forward on your elbows when you sit  This helps your lungs expand and may make it easier to breathe  · Use pursed-lip breathing any time you feel short of breath  Breathe in through your nose and then slowly breathe out through your mouth with your lips slightly puckered  It should take you twice as long to breathe out as it did to breathe in  · Do not smoke  Nicotine and other chemicals in cigarettes and cigars can cause lung damage and make it harder to breathe  Ask your healthcare provider for information if you currently smoke and need help to quit  E-cigarettes or smokeless tobacco still contain nicotine  Talk to your healthcare provider before you use these products  · Reach or maintain a healthy weight  Your healthcare provider can help you create a safe weight loss plan if you are overweight  · Exercise as directed  Exercise can help your lungs work more easily  Exercise can also help you lose weight if needed  Try to get at least 30 minutes of exercise most days of the week  Your healthcare provider can help you create an exercise plan that is safe for you  Follow up with your healthcare provider or specialist as directed:  Write down your questions so you remember to ask them during your visits  © Copyright [address] Information is for End User's use only and may not be sold, redistributed or otherwise used for commercial purposes  All illustrations and images included in CareNotes® are the copyrighted property of A D A M , Inc  or [facility] Zack Rader   The above information is an  only  It is not intended as medical advice for individual conditions or treatments  Talk to your doctor, nurse or pharmacist before following any medical regimen to see if it is safe and effective for you

## 2021-06-28 NOTE — PROGRESS NOTES
[facility]        NAME: Cynthia Jefferson is a 32 y o  female  : 1990    MRN: [id number]  DATE: 2021  TIME: 7:56 AM    /92   Pulse (!) 134 Comment: with ambulation  Temp 98 3 °F (36 8 °C)   Resp 16   Ht 5' 1" (1 549 m)   Wt (!) 152 kg (334 lb)   SpO2 96%   BMI 63 11 kg/m²     Assessment and Plan   SOB (shortness of breath) [R06 02]  1  SOB (shortness of breath)           Patient Instructions       Follow up with PCP in 3-5 days  Proceed to  ER if symptoms worsen  Chief Complaint     Chief Complaint   Patient presents with    Fever     Pt here for fever of 101, bodyaches, HA, sore throat x 3 days  History of Present Illness       Pt with 1-2 weeks of sore throat and body aches  And sob with walking no asthma hx no sob history       Review of Systems   Review of Systems   Constitutional: Negative  HENT: Positive for sore throat  Eyes: Negative  Respiratory: Positive for shortness of breath  Cardiovascular: Negative  Gastrointestinal: Negative  Endocrine: Negative  Genitourinary: Negative  Musculoskeletal: Negative  Skin: Negative  Allergic/Immunologic: Negative  Neurological: Negative  Hematological: Negative  Psychiatric/Behavioral: Negative  All other systems reviewed and are negative          Current Medications       Current Outpatient Medications:     aspirin (ECOTRIN LOW STRENGTH) 81 mg EC tablet, Take 81 mg by mouth daily, Disp: , Rfl:     drospirenone-ethinyl estradiol (TATY) 3-0 02 MG per tablet, Take 1 tablet by mouth daily, Disp: 84 tablet, Rfl: 0    Multiple Vitamins-Minerals (MULTIVITAMIN GUMMIES ADULT PO), Take by mouth, Disp: , Rfl:     omeprazole (PriLOSEC) 20 mg delayed release capsule, Take 1 capsule (20 mg total) by mouth daily before breakfast, Disp: 30 capsule, Rfl: 5    valACYclovir (VALTREX) 500 mg tablet, Take 1 tablet (500 mg total) by mouth 2 (two) times a day for 5 days, Disp: 10 tablet, Rfl: 0   valsartan-hydrochlorothiazide (DIOVAN-HCT) 160-25 MG per tablet, Take 1 tablet by mouth daily, Disp: 90 tablet, Rfl: 1    Current Allergies     Allergies as of 2021 - Reviewed 2021   Allergen Reaction Noted    Erythromycin Shortness Of Breath 10/26/2015    Penicillins Hives and Other (See Comments) 10/26/2015    Medical tape Rash 2017            The following portions of the patient's history were reviewed and updated as appropriate: allergies, current medications, past family history, past medical history, past social history, past surgical history and problem list      Past Medical History:   Diagnosis Date    Abnormal Pap smear of cervix     Anxiety     Eating disorder     Gallstone     Genital herpes     History of gallstones 2018    HPV (human papilloma virus) infection     Hypertension     Varicella     imm       Past Surgical History:   Procedure Laterality Date     SECTION      COLPOSCOPY      ID  DELIVERY ONLY N/A 2019    Procedure:  SECTION ();   Surgeon: [address], [place];  Location: [facility] ;  Service: Obstetrics       Family History   Problem Relation Age of Onset    Hypertension Mother     Hyperlipidemia Mother     Asthma Mother     Mental illness Mother    [de-identified] / [place] Mother     Lung cancer Mother     Liver cancer Mother     Brain cancer Mother     Pulmonary embolism Mother     Hyperlipidemia Maternal Aunt     Early death Maternal Aunt     Brain cancer Maternal Aunt     Lung cancer Maternal Aunt     No Known Problems Father     Drug abuse Brother     Mental illness Brother     Hypertension Maternal Grandmother     Diabetes Maternal Grandmother         type 2    Hearing loss Maternal Grandmother     Early death Maternal Grandmother     Stroke Maternal Grandmother     Cervical cancer Maternal Grandmother     No Known Problems Daughter          Medications have been verified  Objective   /92   Pulse (!) 134 Comment: with ambulation  Temp 98 3 °F (36 8 °C)   Resp 16   Ht 5' 1" (1 549 m)   Wt (!) 152 kg (334 lb)   SpO2 96%   BMI 63 11 kg/m²        Physical Exam     Physical Exam  Vitals and nursing note reviewed  Constitutional:       Appearance: Normal appearance  She is normal weight  Comments: Pt is 334 lbs  On birth control and family members have factor 5 leiden deficits, pt will go to er for possible blood clot evaluation     With 15 foot walk down hallway pulse goes to 134 pt with sob with 15 foot walk     Pt is covid vaccinated    HENT:      Right Ear: Tympanic membrane, ear canal and external ear normal       Left Ear: Tympanic membrane, ear canal and external ear normal       Nose: Nose normal       Mouth/Throat:      Mouth: Mucous membranes are moist       Pharynx: Oropharynx is clear  Eyes:      Extraocular Movements: Extraocular movements intact  Conjunctiva/sclera: Conjunctivae normal       Pupils: Pupils are equal, round, and reactive to light  Cardiovascular:      Rate and Rhythm: Normal rate and regular rhythm  Pulses: Normal pulses  Heart sounds: Normal heart sounds  Pulmonary:      Effort: Pulmonary effort is normal       Breath sounds: Normal breath sounds  Abdominal:      General: Abdomen is flat  Bowel sounds are normal       Palpations: Abdomen is soft  Musculoskeletal:         General: Normal range of motion  Cervical back: Normal range of motion and neck supple  Skin:     General: Skin is warm  Capillary Refill: Capillary refill takes less than 2 seconds  Neurological:      General: No focal deficit present  Mental Status: She is alert and oriented to person, place, and time     Psychiatric:         Mood and Affect: Mood normal          Behavior: Behavior normal

## 2021-07-27 DIAGNOSIS — Z30.41 ENCOUNTER FOR SURVEILLANCE OF CONTRACEPTIVE PILLS: ICD-10-CM

## 2021-07-27 RX ORDER — DROSPIRENONE AND ETHINYL ESTRADIOL 0.02-3(28)
1 KIT ORAL DAILY
Qty: 84 TABLET | Refills: 0 | Status: SHIPPED | OUTPATIENT
Start: 2021-07-27 | End: 2021-08-11 | Stop reason: SDUPTHER

## 2021-07-28 ENCOUNTER — OFFICE VISIT (OUTPATIENT)
Dept: FAMILY MEDICINE CLINIC | Facility: CLINIC | Age: 31
End: 2021-07-28
Payer: COMMERCIAL

## 2021-07-28 VITALS
TEMPERATURE: 97.8 F | HEIGHT: 61 IN | DIASTOLIC BLOOD PRESSURE: 80 MMHG | BODY MASS INDEX: 55.32 KG/M2 | WEIGHT: 293 LBS | OXYGEN SATURATION: 98 % | HEART RATE: 104 BPM | SYSTOLIC BLOOD PRESSURE: 134 MMHG

## 2021-07-28 DIAGNOSIS — J01.00 ACUTE NON-RECURRENT MAXILLARY SINUSITIS: Primary | ICD-10-CM

## 2021-07-28 PROCEDURE — 99213 OFFICE O/P EST LOW 20 MIN: CPT | Performed by: FAMILY MEDICINE

## 2021-07-28 RX ORDER — AZITHROMYCIN 250 MG/1
TABLET, FILM COATED ORAL
Qty: 6 TABLET | Refills: 0 | Status: SHIPPED | OUTPATIENT
Start: 2021-07-28 | End: 2021-08-04

## 2021-07-28 RX ORDER — AZITHROMYCIN 250 MG/1
TABLET, FILM COATED ORAL
Qty: 6 TABLET | Refills: 0 | Status: SHIPPED | OUTPATIENT
Start: 2021-07-28 | End: 2021-07-28 | Stop reason: SDUPTHER

## 2021-07-28 NOTE — PROGRESS NOTES
Assessment/Plan:  Patient will call if no improvement or worsening symptoms  1  Acute non-recurrent maxillary sinusitis  -     azithromycin (ZITHROMAX) 250 mg tablet; Take 2 tablets today then 1 tablet daily x 4 days          Subjective:      Patient ID: Nina Mckenzie is a 32 y o  female  Patient with sinus congestion and pressure and sore throat over the last several days  Symptoms are mild-to-moderate  No GI complaints  The following portions of the patient's history were reviewed and updated as appropriate: allergies, current medications, past family history, past medical history, past social history, past surgical history, and problem list     Review of Systems   Constitutional: Negative  Negative for fever  HENT: Positive for congestion and sinus pressure  Eyes: Negative  Respiratory: Negative  Cardiovascular: Negative  Gastrointestinal: Negative  Endocrine: Negative  Genitourinary: Negative  Musculoskeletal: Negative  Skin: Negative  Allergic/Immunologic: Negative  Neurological: Negative  Hematological: Negative  Psychiatric/Behavioral: Negative  Objective:      /80 (BP Location: Left arm, Patient Position: Sitting, Cuff Size: Large)   Pulse 104   Temp 97 8 °F (36 6 °C) (Temporal)   Ht 5' 1" (1 549 m)   Wt (!) 154 kg (340 lb)   SpO2 98%   BMI 64 24 kg/m²          Physical Exam  Vitals reviewed  Constitutional:       Appearance: She is well-developed  HENT:      Head: Normocephalic and atraumatic  Right Ear: External ear normal  Tympanic membrane is not erythematous or bulging  Left Ear: External ear normal  Tympanic membrane is not erythematous or bulging  Nose: Nose normal       Mouth/Throat:      Mouth: No oral lesions  Pharynx: No oropharyngeal exudate  Eyes:      General: No scleral icterus  Right eye: No discharge  Left eye: No discharge        Conjunctiva/sclera: Conjunctivae normal  Neck:      Thyroid: No thyromegaly  Cardiovascular:      Rate and Rhythm: Normal rate and regular rhythm  Heart sounds: Normal heart sounds  No murmur heard  No friction rub  No gallop  Pulmonary:      Effort: Pulmonary effort is normal  No respiratory distress  Breath sounds: No wheezing or rales  Chest:      Chest wall: No tenderness  Abdominal:      General: Bowel sounds are normal  There is no distension  Palpations: Abdomen is soft  There is no mass  Tenderness: There is no abdominal tenderness  There is no guarding or rebound  Musculoskeletal:         General: No tenderness or deformity  Normal range of motion  Cervical back: Normal range of motion and neck supple  Lymphadenopathy:      Cervical: No cervical adenopathy  Skin:     General: Skin is warm and dry  Coloration: Skin is not pale  Findings: No erythema or rash  Neurological:      Mental Status: She is alert and oriented to person, place, and time  Cranial Nerves: No cranial nerve deficit  Motor: No abnormal muscle tone  Coordination: Coordination normal       Deep Tendon Reflexes: Reflexes are normal and symmetric     Psychiatric:         Behavior: Behavior normal

## 2021-08-11 ENCOUNTER — OFFICE VISIT (OUTPATIENT)
Dept: FAMILY MEDICINE CLINIC | Facility: CLINIC | Age: 31
End: 2021-08-11
Payer: COMMERCIAL

## 2021-08-11 ENCOUNTER — TELEPHONE (OUTPATIENT)
Dept: FAMILY MEDICINE CLINIC | Facility: CLINIC | Age: 31
End: 2021-08-11

## 2021-08-11 VITALS
TEMPERATURE: 98 F | HEIGHT: 61 IN | DIASTOLIC BLOOD PRESSURE: 80 MMHG | SYSTOLIC BLOOD PRESSURE: 126 MMHG | HEART RATE: 96 BPM | WEIGHT: 293 LBS | OXYGEN SATURATION: 99 % | BODY MASS INDEX: 55.32 KG/M2

## 2021-08-11 DIAGNOSIS — L73.9 FOLLICULITIS: Primary | ICD-10-CM

## 2021-08-11 DIAGNOSIS — Z30.41 ENCOUNTER FOR SURVEILLANCE OF CONTRACEPTIVE PILLS: ICD-10-CM

## 2021-08-11 DIAGNOSIS — R73.03 PRE-DIABETES: ICD-10-CM

## 2021-08-11 DIAGNOSIS — L83 ACANTHOSIS NIGRICANS: ICD-10-CM

## 2021-08-11 DIAGNOSIS — N92.6 MISSED MENSES: ICD-10-CM

## 2021-08-11 LAB — SL AMB POCT URINE HCG: NEGATIVE

## 2021-08-11 PROCEDURE — 99214 OFFICE O/P EST MOD 30 MIN: CPT | Performed by: FAMILY MEDICINE

## 2021-08-11 PROCEDURE — 81025 URINE PREGNANCY TEST: CPT | Performed by: FAMILY MEDICINE

## 2021-08-11 PROCEDURE — 36415 COLL VENOUS BLD VENIPUNCTURE: CPT | Performed by: FAMILY MEDICINE

## 2021-08-11 RX ORDER — CEPHALEXIN 500 MG/1
500 CAPSULE ORAL 4 TIMES DAILY
Qty: 28 CAPSULE | Refills: 0 | Status: SHIPPED | OUTPATIENT
Start: 2021-08-11 | End: 2021-08-18

## 2021-08-11 RX ORDER — DROSPIRENONE AND ETHINYL ESTRADIOL 0.02-3(28)
1 KIT ORAL DAILY
Qty: 84 TABLET | Refills: 0 | Status: SHIPPED | OUTPATIENT
Start: 2021-08-11 | End: 2021-11-08 | Stop reason: SDUPTHER

## 2021-08-11 NOTE — PROGRESS NOTES
Assessment/Plan:  Continue Keflex  We will restart birth control as well  Recommended A1c to be checked  Consider starting metformin if A1c is elevated  Recommend follow-up with weight management  Patient states she is scheduled to see them but she is on a waiting list      1  Folliculitis  -     cephalexin (KEFLEX) 500 mg capsule; Take 1 capsule (500 mg total) by mouth 4 (four) times a day for 7 days    2  Acanthosis nigricans  -     Basic metabolic panel  -     Hemoglobin A1C    3  Missed menses  -     POCT urine HCG    4  Encounter for surveillance of contraceptive pills  -     drospirenone-ethinyl estradiol (TATY) 3-0 02 MG per tablet; Take 1 tablet by mouth daily    5  Pre-diabetes    6  BMI 50 0-59 9, adult ([facility])          Subjective:      Patient ID: Ronny Willis is a 32 y o  female  Patient with history of recurrent folliculitis of the legs  She had a flare-up over the last few days and started on Keflex that was prescribed by urgent care center  She is tolerating this medication well and is on day 2  With some improvement  Lesions are to the bilateral inner thighs  No fevers  She has history of obesity  She believes that folliculitis has worsened since she stopped birth control and she would like to restart this  She is planning on seeing weight management physician  The following portions of the patient's history were reviewed and updated as appropriate: allergies, current medications, past family history, past medical history, past social history, past surgical history, and problem list     Review of Systems   Constitutional: Negative  HENT: Negative  Eyes: Negative  Respiratory: Negative  Cardiovascular: Negative  Gastrointestinal: Negative  Endocrine: Negative  Genitourinary: Negative  Musculoskeletal: Negative  Skin: Positive for rash (As noted in HPI)  Allergic/Immunologic: Negative  Neurological: Negative  Hematological: Negative  Psychiatric/Behavioral: Negative  Objective:      /80 (BP Location: Left arm, Patient Position: Sitting, Cuff Size: Large)   Pulse 96   Temp 98 °F (36 7 °C) (Temporal)   Ht 5' 1" (1 549 m)   Wt (!) 155 kg (342 lb 9 6 oz)   LMP 06/22/2021   SpO2 99%   BMI 64 73 kg/m²          Physical Exam  Vitals reviewed  Constitutional:       Appearance: She is well-developed  HENT:      Head: Normocephalic and atraumatic  Right Ear: External ear normal  Tympanic membrane is not erythematous or bulging  Left Ear: External ear normal  Tympanic membrane is not erythematous or bulging  Nose: Nose normal       Mouth/Throat:      Mouth: No oral lesions  Pharynx: No oropharyngeal exudate  Eyes:      General: No scleral icterus  Right eye: No discharge  Left eye: No discharge  Conjunctiva/sclera: Conjunctivae normal    Neck:      Thyroid: No thyromegaly  Cardiovascular:      Rate and Rhythm: Normal rate and regular rhythm  Heart sounds: Normal heart sounds  No murmur heard  No friction rub  No gallop  Pulmonary:      Effort: Pulmonary effort is normal  No respiratory distress  Breath sounds: No wheezing or rales  Chest:      Chest wall: No tenderness  Abdominal:      General: Bowel sounds are normal  There is no distension  Palpations: Abdomen is soft  There is no mass  Tenderness: There is no abdominal tenderness  There is no guarding or rebound  Musculoskeletal:         General: No tenderness or deformity  Normal range of motion  Cervical back: Normal range of motion and neck supple  Lymphadenopathy:      Cervical: No cervical adenopathy  Skin:     General: Skin is warm and dry  Coloration: Skin is not pale  Findings: Rash (Follicular rash to bilateral inner thighs ) present  No erythema  Neurological:      Mental Status: She is alert and oriented to person, place, and time        Cranial Nerves: No cranial nerve deficit  Motor: No abnormal muscle tone  Coordination: Coordination normal       Deep Tendon Reflexes: Reflexes are normal and symmetric     Psychiatric:         Behavior: Behavior normal

## 2021-08-11 NOTE — TELEPHONE ENCOUNTER
Pt calling stating that she has a spot on which R inner thigh  It is an ingrown hair per pt  Pt states that it's red and has a ring around it  She states that she has Cephelaxin 500 mg at home that she was prescribed in the past by an ER dr  She has been taking it 4 times daily since Sunday  She states that spot hasn't improved  Should pt keep taking antibiotic or should she be seen? Please advise  appt made for today at 3:45 in case you want to see her

## 2021-08-13 LAB
BUN SERPL-MCNC: 16 MG/DL (ref 6–20)
BUN/CREAT SERPL: 26 (ref 9–23)
CALCIUM SERPL-MCNC: 9.4 MG/DL (ref 8.7–10.2)
CHLORIDE SERPL-SCNC: 101 MMOL/L (ref 96–106)
CO2 SERPL-SCNC: 25 MMOL/L (ref 20–29)
CREAT SERPL-MCNC: 0.61 MG/DL (ref 0.57–1)
EST. AVERAGE GLUCOSE BLD GHB EST-MCNC: 140 MG/DL
GLUCOSE SERPL-MCNC: 114 MG/DL (ref 65–99)
HBA1C MFR BLD: 6.5 % (ref 4.8–5.6)
POTASSIUM SERPL-SCNC: 4.1 MMOL/L (ref 3.5–5.2)
SL AMB EGFR AFRICAN AMERICAN: 140 ML/MIN/1.73
SL AMB EGFR NON AFRICAN AMERICAN: 121 ML/MIN/1.73
SODIUM SERPL-SCNC: 142 MMOL/L (ref 134–144)

## 2021-08-20 ENCOUNTER — ANNUAL EXAM (OUTPATIENT)
Dept: OBGYN CLINIC | Facility: CLINIC | Age: 31
End: 2021-08-20
Payer: COMMERCIAL

## 2021-08-20 ENCOUNTER — TELEPHONE (OUTPATIENT)
Dept: FAMILY MEDICINE CLINIC | Facility: CLINIC | Age: 31
End: 2021-08-20

## 2021-08-20 ENCOUNTER — APPOINTMENT (OUTPATIENT)
Dept: LAB | Facility: AMBULARY SURGERY CENTER | Age: 31
End: 2021-08-20
Payer: COMMERCIAL

## 2021-08-20 VITALS
WEIGHT: 293 LBS | BODY MASS INDEX: 53.92 KG/M2 | SYSTOLIC BLOOD PRESSURE: 114 MMHG | HEIGHT: 62 IN | DIASTOLIC BLOOD PRESSURE: 72 MMHG

## 2021-08-20 DIAGNOSIS — E66.01 MORBID OBESITY WITH BMI OF 60.0-69.9, ADULT (HCC): ICD-10-CM

## 2021-08-20 DIAGNOSIS — N91.2 AMENORRHEA: ICD-10-CM

## 2021-08-20 DIAGNOSIS — L83 ACANTHOSIS NIGRICANS: ICD-10-CM

## 2021-08-20 DIAGNOSIS — Z01.419 WOMEN'S ANNUAL ROUTINE GYNECOLOGICAL EXAMINATION: Primary | ICD-10-CM

## 2021-08-20 LAB
B-HCG SERPL-ACNC: <2 MIU/ML
BASOPHILS # BLD AUTO: 0.06 THOUSANDS/ΜL (ref 0–0.1)
BASOPHILS NFR BLD AUTO: 1 % (ref 0–1)
EOSINOPHIL # BLD AUTO: 0.17 THOUSAND/ΜL (ref 0–0.61)
EOSINOPHIL NFR BLD AUTO: 1 % (ref 0–6)
ERYTHROCYTE [DISTWIDTH] IN BLOOD BY AUTOMATED COUNT: 18.1 % (ref 11.6–15.1)
FSH SERPL-ACNC: 3.8 MIU/ML
HCT VFR BLD AUTO: 42.1 % (ref 34.8–46.1)
HGB BLD-MCNC: 12.6 G/DL (ref 11.5–15.4)
IMM GRANULOCYTES # BLD AUTO: 0.17 THOUSAND/UL (ref 0–0.2)
IMM GRANULOCYTES NFR BLD AUTO: 1 % (ref 0–2)
LYMPHOCYTES # BLD AUTO: 1.33 THOUSANDS/ΜL (ref 0.6–4.47)
LYMPHOCYTES NFR BLD AUTO: 11 % (ref 14–44)
MCH RBC QN AUTO: 24.4 PG (ref 26.8–34.3)
MCHC RBC AUTO-ENTMCNC: 29.9 G/DL (ref 31.4–37.4)
MCV RBC AUTO: 81 FL (ref 82–98)
MONOCYTES # BLD AUTO: 0.79 THOUSAND/ΜL (ref 0.17–1.22)
MONOCYTES NFR BLD AUTO: 6 % (ref 4–12)
NEUTROPHILS # BLD AUTO: 9.79 THOUSANDS/ΜL (ref 1.85–7.62)
NEUTS SEG NFR BLD AUTO: 80 % (ref 43–75)
NRBC BLD AUTO-RTO: 0 /100 WBCS
PLATELET # BLD AUTO: 423 THOUSANDS/UL (ref 149–390)
PMV BLD AUTO: 9.8 FL (ref 8.9–12.7)
PROLACTIN SERPL-MCNC: 8.9 NG/ML
RBC # BLD AUTO: 5.17 MILLION/UL (ref 3.81–5.12)
TSH SERPL DL<=0.05 MIU/L-ACNC: 2.88 UIU/ML (ref 0.36–3.74)
WBC # BLD AUTO: 12.31 THOUSAND/UL (ref 4.31–10.16)

## 2021-08-20 PROCEDURE — 83001 ASSAY OF GONADOTROPIN (FSH): CPT

## 2021-08-20 PROCEDURE — 99395 PREV VISIT EST AGE 18-39: CPT | Performed by: PHYSICIAN ASSISTANT

## 2021-08-20 PROCEDURE — 3008F BODY MASS INDEX DOCD: CPT | Performed by: PHYSICIAN ASSISTANT

## 2021-08-20 PROCEDURE — 84702 CHORIONIC GONADOTROPIN TEST: CPT

## 2021-08-20 PROCEDURE — 85025 COMPLETE CBC W/AUTO DIFF WBC: CPT | Performed by: FAMILY MEDICINE

## 2021-08-20 PROCEDURE — 1036F TOBACCO NON-USER: CPT | Performed by: PHYSICIAN ASSISTANT

## 2021-08-20 PROCEDURE — 84146 ASSAY OF PROLACTIN: CPT

## 2021-08-20 PROCEDURE — 84443 ASSAY THYROID STIM HORMONE: CPT

## 2021-08-20 PROCEDURE — 36415 COLL VENOUS BLD VENIPUNCTURE: CPT | Performed by: FAMILY MEDICINE

## 2021-08-20 NOTE — PROGRESS NOTES
ASSESSMENT & PLAN: Jack Donato is a 32 y o  Balinda Forester with normal gynecologic exam     1   Routine well woman exam done today  2    Pap and HPV:Pap with HPV was not done today  Current ASCCP Guidelines reviewed  Last Pap  2020 :  no abnormalities  3   The patient declined STD testing  no testing performed  Safe sex practices have been discussed  4  The patient is sexually active  5  The following were reviewed in today's visit: breast self exam, mammography screening ordered, STD testing, exercise and healthy diet  6  Patient to return to office in 12 months for annual    7  AIC 6 5 - recommend patient strict glucose monitoring, carb counting, follow with PCP for management  8  Recommend increase in exercise - 30 minutes daily 5 days per week  9  TATY - set timer on phone to ensure taking it every day  10  Amenorrhea labs ordered    All questions have been answered to her satisfaction  CC:  Annual Gynecologic Examination    HPI: Jack Donato is a 32 y o  Balinda Forester who presents for annual gynecologic examination  She is feeling well at this time  She has not had a period since June of 2021  She has taken pregnancy tests weekly for the last month and all have been negative  She reports she started TATY on 8/11, and states she has forgotten to take a few pills  She is sexually active with one partner  Patient reports she needs to lose weight  She is on a wait list for both [facility] and [facility] for the bariatric center  Patient reports she had bloodwork done which showed she was diabetic, but her PCP told her she did not need any management at this time and to repeat the labs in 3-6 months  Patient also states her neck has been getting dark In color on the left side  Health Maintenance:    She exercises 0 days per week  She does not perform regular monthly self breast exams  She feels safe at home  Patients does not follow a well balanced diet        Past Medical History:   Diagnosis Date    Abnormal Pap smear of cervix     Anxiety     Eating disorder     Gallstone     Genital herpes     History of gallstones 2018    HPV (human papilloma virus) infection     Hypertension     Varicella     imm       Past Surgical History:   Procedure Laterality Date     SECTION      COLPOSCOPY      [place]  DELIVERY ONLY N/A 2019    Procedure:  SECTION (); Surgeon: Nelida Sky MD;  Location: [facility];  Service: Obstetrics       Past OB/Gyn History:  Period Cycle (Days):  (n/a absent)  Period Pattern: (!) Irregular  Dysmenorrhea: NonePatient's last menstrual period was 2021 (exact date)  Menstrual History:  OB History        1    Para   1    Term   1            AB   0    Living   1       SAB        TAB        Ectopic        Multiple   0    Live Births   1           Obstetric Comments     Menarche 13            Menarche age: 13  Patient's last menstrual period was 2021 (exact date)  LMP 2021  Period Cycle (Days):  (n/a absent)  Period Pattern: (!) Irregular  Dysmenorrhea: None    History of sexually transmitted infection Yes  Patient is currently sexually active  heterosexual and  monogamous  years Birth control: combination OCPs  Last Pap  2020 :  no abnormalities      Family History   Problem Relation Age of Onset    Hypertension Mother     Hyperlipidemia Mother     Asthma Mother     Mental illness Mother    [de-identified] / [place] Mother     Lung cancer Mother     Liver cancer Mother     Brain cancer Mother     Pulmonary embolism Mother     Hyperlipidemia Maternal Aunt     Early death Maternal Aunt     Brain cancer Maternal Aunt     Lung cancer Maternal Aunt     No Known Problems Father     Drug abuse Brother     Mental illness Brother     Hypertension Maternal Grandmother     Diabetes Maternal Grandmother         type 2    Hearing loss Maternal Grandmother     Early death Maternal Grandmother     Stroke Maternal Grandmother     Cervical cancer Maternal Grandmother     No Known Problems Daughter        Social History:  Social History     Socioeconomic History    Marital status: Single     Spouse name: Michelle Alcaraz    Number of children: Not on file    Years of education: [employer]    Highest education level: Not on file   Occupational History    Occupation: MA   Tobacco Use    Smoking status: Never Smoker    Smokeless tobacco: Never Used   Vaping Use    Vaping Use: Never used   Substance and Sexual Activity    Alcohol use: Not Currently     Alcohol/week: 0 0 standard drinks    Drug use: Never    Sexual activity: Yes     Partners: Male     Birth control/protection: OCP   Other Topics Concern    Not on file   Social History Narrative    Not on file     Social Determinants of Health     Financial Resource Strain:     Difficulty of Paying Living Expenses:    Food Insecurity:     Worried About Running Out of Food in the Last Year:     [address] in the Last Year:    Transportation Needs:     Lack of Transportation (Medical):  Lack of Transportation (Non-Medical):    Physical Activity:     Days of Exercise per Week:     Minutes of Exercise per Session:    Stress:     Feeling of Stress :    Social Connections:     Frequency of Communication with Friends and Family:     Frequency of Social Gatherings with Friends and Family:     Attends Jew Services:     Active Member of Clubs or Organizations:     Attends Club or Organization Meetings:     Marital Status:    Intimate Partner Violence:     Fear of Current or Ex-Partner:     Emotionally Abused:     Physically Abused:     Sexually Abused:      Presently lives with partner and children  Patient is single    Patient is currently employed      Allergies   Allergen Reactions    Erythromycin Shortness Of Breath    Penicillins Hives and Other (See Comments)     Mom told pt she had reaction when she was an infant, but can have Amoxicillin  Medical Tape Rash       Current Outpatient Medications:     drospirenone-ethinyl estradiol (TATY) 3-0 02 MG per tablet, Take 1 tablet by mouth daily, Disp: 84 tablet, Rfl: 0    Multiple Vitamins-Minerals (HAIR SKIN AND NAILS FORMULA PO), Take 1 tablet by mouth daily, Disp: , Rfl:     Multiple Vitamins-Minerals (MULTIVITAMIN GUMMIES ADULT PO), Take by mouth, Disp: , Rfl:     omeprazole (PriLOSEC) 20 mg delayed release capsule, Take 1 capsule (20 mg total) by mouth daily before breakfast, Disp: 30 capsule, Rfl: 5    valsartan-hydrochlorothiazide (DIOVAN-HCT) 160-25 MG per tablet, Take 1 tablet by mouth daily, Disp: 90 tablet, Rfl: 1    valACYclovir (VALTREX) 500 mg tablet, Take 1 tablet (500 mg total) by mouth 2 (two) times a day for 5 days, Disp: 10 tablet, Rfl: 0    Review of Systems:  Review of Systems   Constitutional: Negative for chills, fever and unexpected weight change  Respiratory: Negative for shortness of breath  Cardiovascular: Negative for chest pain  Gastrointestinal: Negative for abdominal pain, diarrhea, nausea and vomiting  Skin: Positive for rash (dark rash around neck)  Psychiatric/Behavioral: Negative for dysphoric mood  The patient is not nervous/anxious  Physical Exam:  /72   Ht 5' 2" (1 575 m)   Wt (!) 155 kg (341 lb)   LMP 06/22/2021 (Exact Date)   Breastfeeding No   BMI 62 37 kg/m²    Physical Exam  Constitutional:       General: She is not in acute distress  Appearance: She is obese  HENT:      Head: Normocephalic and atraumatic  Neck:      Thyroid: No thyroid mass or thyromegaly  Comments: Dark brown macular discoloration on left side within neck folds  Cardiovascular:      Rate and Rhythm: Normal rate and regular rhythm  Pulmonary:      Effort: Pulmonary effort is normal       Breath sounds: Normal breath sounds  Chest:      Breasts: Breasts are symmetrical          Right: Normal  No mass, skin change or tenderness           Left: Normal  No mass, skin change or tenderness  Abdominal:      General: There is no distension  Palpations: Abdomen is soft  Tenderness: There is no abdominal tenderness  Genitourinary:     General: Normal vulva  Exam position: Lithotomy position  Pubic Area: No rash  Labia:         Right: No rash or lesion  Left: No rash or lesion  Vagina: Normal  No vaginal discharge or lesions  Cervix: Normal       Uterus: Normal        Adnexa:         Right: No mass  Left: No mass  Comments: Exam limited by body habitus  Lymphadenopathy:      Upper Body:      Right upper body: No axillary adenopathy  Left upper body: No axillary adenopathy  Skin:     General: Skin is warm and dry  Findings: No lesion or rash  Neurological:      Mental Status: She is alert     Psychiatric:         Mood and Affect: Mood normal          Speech: Speech normal          Behavior: Behavior normal

## 2021-08-20 NOTE — TELEPHONE ENCOUNTER
Pt called to express her concerns that she is worried about her a1c levels from last ov, pt states her obgyn thinks we should retest her sooner and that she just doesn't want it to become a problem or become more elevated before her next appt  I did express to the pt that it is still in normal range and that I would reach out to you in regard to this

## 2021-08-29 DIAGNOSIS — K21.9 GASTROESOPHAGEAL REFLUX DISEASE WITHOUT ESOPHAGITIS: ICD-10-CM

## 2021-08-30 RX ORDER — OMEPRAZOLE 20 MG/1
CAPSULE, DELAYED RELEASE ORAL
Qty: 30 CAPSULE | Refills: 2 | Status: SHIPPED | OUTPATIENT
Start: 2021-08-30 | End: 2021-11-29

## 2021-10-01 ENCOUNTER — OFFICE VISIT (OUTPATIENT)
Dept: URGENT CARE | Facility: MEDICAL CENTER | Age: 31
End: 2021-10-01
Payer: COMMERCIAL

## 2021-10-01 VITALS
OXYGEN SATURATION: 98 % | RESPIRATION RATE: 18 BRPM | SYSTOLIC BLOOD PRESSURE: 173 MMHG | TEMPERATURE: 98.4 F | DIASTOLIC BLOOD PRESSURE: 79 MMHG | HEART RATE: 106 BPM

## 2021-10-01 DIAGNOSIS — J02.9 SORE THROAT: ICD-10-CM

## 2021-10-01 DIAGNOSIS — H60.503 ACUTE OTITIS EXTERNA OF BOTH EARS, UNSPECIFIED TYPE: Primary | ICD-10-CM

## 2021-10-01 LAB — S PYO AG THROAT QL: NEGATIVE

## 2021-10-01 PROCEDURE — 87880 STREP A ASSAY W/OPTIC: CPT | Performed by: PHYSICIAN ASSISTANT

## 2021-10-01 PROCEDURE — 87070 CULTURE OTHR SPECIMN AEROBIC: CPT | Performed by: PHYSICIAN ASSISTANT

## 2021-10-01 PROCEDURE — 99213 OFFICE O/P EST LOW 20 MIN: CPT | Performed by: PHYSICIAN ASSISTANT

## 2021-10-01 RX ORDER — OFLOXACIN 3 MG/ML
10 SOLUTION AURICULAR (OTIC) DAILY
Qty: 5 ML | Refills: 0 | Status: SHIPPED | OUTPATIENT
Start: 2021-10-01 | End: 2021-10-08

## 2021-10-01 RX ORDER — METFORMIN HYDROCHLORIDE 500 MG/1
500 TABLET, EXTENDED RELEASE ORAL
COMMUNITY
Start: 2021-09-07 | End: 2022-07-13

## 2021-10-03 ENCOUNTER — TELEPHONE (OUTPATIENT)
Dept: URGENT CARE | Facility: MEDICAL CENTER | Age: 31
End: 2021-10-03

## 2021-10-03 LAB — BACTERIA THROAT CULT: NORMAL

## 2021-11-08 DIAGNOSIS — Z30.41 ENCOUNTER FOR SURVEILLANCE OF CONTRACEPTIVE PILLS: ICD-10-CM

## 2021-11-08 RX ORDER — DROSPIRENONE AND ETHINYL ESTRADIOL 0.02-3(28)
1 KIT ORAL DAILY
Qty: 84 TABLET | Refills: 3 | Status: SHIPPED | OUTPATIENT
Start: 2021-11-08 | End: 2022-04-19 | Stop reason: SDUPTHER

## 2021-11-08 RX ORDER — DROSPIRENONE AND ETHINYL ESTRADIOL 0.02-3(28)
1 KIT ORAL DAILY
Qty: 84 TABLET | Refills: 3 | Status: SHIPPED | OUTPATIENT
Start: 2021-11-08 | End: 2021-11-08

## 2021-11-15 ENCOUNTER — TELEPHONE (OUTPATIENT)
Dept: FAMILY MEDICINE CLINIC | Facility: CLINIC | Age: 31
End: 2021-11-15

## 2021-11-15 ENCOUNTER — OFFICE VISIT (OUTPATIENT)
Dept: FAMILY MEDICINE CLINIC | Facility: CLINIC | Age: 31
End: 2021-11-15
Payer: COMMERCIAL

## 2021-11-15 VITALS
WEIGHT: 293 LBS | DIASTOLIC BLOOD PRESSURE: 80 MMHG | HEIGHT: 62 IN | SYSTOLIC BLOOD PRESSURE: 136 MMHG | TEMPERATURE: 97.8 F | BODY MASS INDEX: 53.92 KG/M2 | HEART RATE: 93 BPM | OXYGEN SATURATION: 98 %

## 2021-11-15 DIAGNOSIS — K21.9 GASTROESOPHAGEAL REFLUX DISEASE WITHOUT ESOPHAGITIS: Primary | ICD-10-CM

## 2021-11-15 DIAGNOSIS — F41.1 GENERALIZED ANXIETY DISORDER: ICD-10-CM

## 2021-11-15 PROCEDURE — 1036F TOBACCO NON-USER: CPT | Performed by: FAMILY MEDICINE

## 2021-11-15 PROCEDURE — 99214 OFFICE O/P EST MOD 30 MIN: CPT | Performed by: FAMILY MEDICINE

## 2021-11-15 PROCEDURE — 3008F BODY MASS INDEX DOCD: CPT | Performed by: FAMILY MEDICINE

## 2021-11-15 RX ORDER — SUCRALFATE ORAL 1 G/10ML
1 SUSPENSION ORAL
Qty: 420 ML | Refills: 0 | Status: SHIPPED | OUTPATIENT
Start: 2021-11-15 | End: 2022-01-11 | Stop reason: SDUPTHER

## 2021-11-15 RX ORDER — PANTOPRAZOLE SODIUM 40 MG/1
40 TABLET, DELAYED RELEASE ORAL
Qty: 90 TABLET | Refills: 3 | Status: SHIPPED | OUTPATIENT
Start: 2021-11-15 | End: 2022-01-11 | Stop reason: ALTCHOICE

## 2021-11-15 RX ORDER — ESCITALOPRAM OXALATE 10 MG/1
10 TABLET ORAL DAILY
Qty: 90 TABLET | Refills: 0 | Status: SHIPPED | OUTPATIENT
Start: 2021-11-15 | End: 2021-12-28 | Stop reason: ALTCHOICE

## 2021-11-29 DIAGNOSIS — K21.9 GASTROESOPHAGEAL REFLUX DISEASE WITHOUT ESOPHAGITIS: ICD-10-CM

## 2021-11-29 RX ORDER — OMEPRAZOLE 20 MG/1
CAPSULE, DELAYED RELEASE ORAL
Qty: 30 CAPSULE | Refills: 2 | Status: SHIPPED | OUTPATIENT
Start: 2021-11-29 | End: 2022-01-01

## 2021-12-10 ENCOUNTER — NURSE TRIAGE (OUTPATIENT)
Dept: OTHER | Facility: OTHER | Age: 31
End: 2021-12-10

## 2021-12-10 ENCOUNTER — OFFICE VISIT (OUTPATIENT)
Dept: FAMILY MEDICINE CLINIC | Facility: CLINIC | Age: 31
End: 2021-12-10
Payer: COMMERCIAL

## 2021-12-10 VITALS
HEART RATE: 117 BPM | BODY MASS INDEX: 61.6 KG/M2 | SYSTOLIC BLOOD PRESSURE: 160 MMHG | DIASTOLIC BLOOD PRESSURE: 76 MMHG | WEIGHT: 293 LBS | OXYGEN SATURATION: 97 % | TEMPERATURE: 97.7 F

## 2021-12-10 DIAGNOSIS — H65.191 OTHER NON-RECURRENT ACUTE NONSUPPURATIVE OTITIS MEDIA OF RIGHT EAR: Primary | ICD-10-CM

## 2021-12-10 PROBLEM — H66.90 OTITIS MEDIA: Status: ACTIVE | Noted: 2021-12-10

## 2021-12-10 PROCEDURE — 3725F SCREEN DEPRESSION PERFORMED: CPT | Performed by: FAMILY MEDICINE

## 2021-12-10 PROCEDURE — 99213 OFFICE O/P EST LOW 20 MIN: CPT | Performed by: FAMILY MEDICINE

## 2021-12-10 RX ORDER — BLOOD SUGAR DIAGNOSTIC
STRIP MISCELLANEOUS
COMMUNITY
Start: 2021-11-22 | End: 2022-07-13

## 2021-12-10 RX ORDER — AMOXICILLIN 875 MG/1
875 TABLET, COATED ORAL 2 TIMES DAILY
Qty: 14 TABLET | Refills: 0 | Status: SHIPPED | OUTPATIENT
Start: 2021-12-10 | End: 2021-12-17

## 2021-12-28 ENCOUNTER — OFFICE VISIT (OUTPATIENT)
Dept: FAMILY MEDICINE CLINIC | Facility: CLINIC | Age: 31
End: 2021-12-28
Payer: COMMERCIAL

## 2021-12-28 ENCOUNTER — TELEPHONE (OUTPATIENT)
Dept: OTHER | Facility: OTHER | Age: 31
End: 2021-12-28

## 2021-12-28 VITALS
HEIGHT: 62 IN | WEIGHT: 293 LBS | BODY MASS INDEX: 53.92 KG/M2 | HEART RATE: 75 BPM | OXYGEN SATURATION: 97 % | TEMPERATURE: 97.1 F

## 2021-12-28 DIAGNOSIS — L30.4 INTERTRIGO: ICD-10-CM

## 2021-12-28 DIAGNOSIS — F41.1 GENERALIZED ANXIETY DISORDER: ICD-10-CM

## 2021-12-28 DIAGNOSIS — U07.1 COVID-19 VIRUS INFECTION: Primary | ICD-10-CM

## 2021-12-28 PROCEDURE — 1036F TOBACCO NON-USER: CPT | Performed by: FAMILY MEDICINE

## 2021-12-28 PROCEDURE — 99213 OFFICE O/P EST LOW 20 MIN: CPT | Performed by: FAMILY MEDICINE

## 2021-12-28 PROCEDURE — 3008F BODY MASS INDEX DOCD: CPT | Performed by: FAMILY MEDICINE

## 2021-12-28 RX ORDER — NYSTATIN 100000 [USP'U]/G
POWDER TOPICAL 3 TIMES DAILY
Qty: 15 G | Refills: 3 | Status: SHIPPED | OUTPATIENT
Start: 2021-12-28 | End: 2021-12-28 | Stop reason: SDUPTHER

## 2021-12-28 RX ORDER — SODIUM SULFIDE 10 MG/G
15 GEL TOPICAL
Qty: 236 ML | Refills: 0 | Status: SHIPPED | OUTPATIENT
Start: 2021-12-28 | End: 2022-05-06 | Stop reason: ALTCHOICE

## 2021-12-28 RX ORDER — NYSTATIN 100000 [USP'U]/G
POWDER TOPICAL 3 TIMES DAILY
Qty: 15 G | Refills: 0 | Status: SHIPPED | OUTPATIENT
Start: 2021-12-28 | End: 2022-07-13

## 2021-12-28 RX ORDER — SERTRALINE HYDROCHLORIDE 25 MG/1
25 TABLET, FILM COATED ORAL DAILY
Qty: 90 TABLET | Refills: 3 | Status: SHIPPED | OUTPATIENT
Start: 2021-12-28 | End: 2022-01-18

## 2021-12-28 RX ORDER — CEFDINIR 300 MG/1
300 CAPSULE ORAL 2 TIMES DAILY
COMMUNITY
Start: 2021-12-18 | End: 2021-12-28

## 2021-12-29 ENCOUNTER — APPOINTMENT (OUTPATIENT)
Dept: RADIOLOGY | Facility: MEDICAL CENTER | Age: 31
End: 2021-12-29
Payer: COMMERCIAL

## 2021-12-29 DIAGNOSIS — U07.1 COVID-19 VIRUS INFECTION: ICD-10-CM

## 2021-12-29 PROCEDURE — 71046 X-RAY EXAM CHEST 2 VIEWS: CPT

## 2022-01-01 DIAGNOSIS — K21.9 GASTROESOPHAGEAL REFLUX DISEASE WITHOUT ESOPHAGITIS: ICD-10-CM

## 2022-01-01 RX ORDER — OMEPRAZOLE 20 MG/1
CAPSULE, DELAYED RELEASE ORAL
Qty: 90 CAPSULE | Refills: 1 | Status: SHIPPED | OUTPATIENT
Start: 2022-01-01 | End: 2022-04-27 | Stop reason: ALTCHOICE

## 2022-01-11 ENCOUNTER — OFFICE VISIT (OUTPATIENT)
Dept: FAMILY MEDICINE CLINIC | Facility: CLINIC | Age: 32
End: 2022-01-11
Payer: COMMERCIAL

## 2022-01-11 ENCOUNTER — TELEPHONE (OUTPATIENT)
Dept: FAMILY MEDICINE CLINIC | Facility: CLINIC | Age: 32
End: 2022-01-11

## 2022-01-11 VITALS
TEMPERATURE: 98 F | BODY MASS INDEX: 51.91 KG/M2 | HEART RATE: 87 BPM | HEIGHT: 63 IN | SYSTOLIC BLOOD PRESSURE: 130 MMHG | DIASTOLIC BLOOD PRESSURE: 90 MMHG | OXYGEN SATURATION: 97 % | WEIGHT: 293 LBS

## 2022-01-11 DIAGNOSIS — U09.9 POST-COVID SYNDROME: Primary | ICD-10-CM

## 2022-01-11 DIAGNOSIS — F41.1 GENERALIZED ANXIETY DISORDER: ICD-10-CM

## 2022-01-11 DIAGNOSIS — K21.9 GASTROESOPHAGEAL REFLUX DISEASE WITHOUT ESOPHAGITIS: ICD-10-CM

## 2022-01-11 PROCEDURE — 99214 OFFICE O/P EST MOD 30 MIN: CPT | Performed by: FAMILY MEDICINE

## 2022-01-11 RX ORDER — ALBUTEROL SULFATE 90 UG/1
2 AEROSOL, METERED RESPIRATORY (INHALATION) EVERY 6 HOURS PRN
Qty: 18 G | Refills: 5 | Status: SHIPPED | OUTPATIENT
Start: 2022-01-11 | End: 2022-07-13

## 2022-01-11 RX ORDER — SUCRALFATE ORAL 1 G/10ML
1 SUSPENSION ORAL
Qty: 420 ML | Refills: 0 | Status: SHIPPED | OUTPATIENT
Start: 2022-01-11 | End: 2022-05-19 | Stop reason: SDUPTHER

## 2022-01-11 NOTE — ASSESSMENT & PLAN NOTE
- Discussed that SSRI's typically take 4-6 weeks to take effect  - Continue Zoloft 25 mg and re-evaluate in 4 weeks, if not improvement increase to Zoloft 50 mg daily   - Referral to social work care management program placed to connect patient with mental health resources in the area

## 2022-01-11 NOTE — PROGRESS NOTES
Assessment/Plan:    Post-COVID syndrome  - Start trial of albuterol inhaler   - If symptoms persist consider obtaining pulmonary function testing and pulmonology referral    Generalized anxiety disorder  - Discussed that SSRI's typically take 4-6 weeks to take effect  - Continue Zoloft 25 mg and re-evaluate in 4 weeks, if not improvement increase to Zoloft 50 mg daily   - Referral to social work care management program placed to connect patient with mental health resources in the area     Gastroesophageal reflux disease without esophagitis  - Patient advised to increase Omeprazole to 40mg daily and will add Carafate 10mL 4 times a day   - We also reviewed the causes of heartburn and discussed importance of diet and lifestyle modifications to control GERD symptoms and avoiding things which worsen heartburn such as caffeine, tomato based products, spicy foods, tobacco, alcohol, obesity, tight fitting clothing   - Discussed importance of eating small, frequent meals instead of large meals  - Elevate head of the bed and do not lay down 2-3 hours following a meal   - If no improvement with medication and lifestyle modifications, consider referral to gastroenterology and EGD              Diagnoses and all orders for this visit:    Post-COVID syndrome  -     albuterol (Ventolin HFA) 90 mcg/act inhaler; Inhale 2 puffs every 6 (six) hours as needed for wheezing    Generalized anxiety disorder  -     Ambulatory Referral to Social Work Care Management Program; Future    Gastroesophageal reflux disease without esophagitis  -     sucralfate (CARAFATE) 1 g/10 mL suspension; Take 10 mL (1 g total) by mouth 4 (four) times a day (with meals and at bedtime)          Subjective:      Patient ID: Jack Mcdaniel is a 32 y o  female  HPI     Jack Mcdaniel is a 32year old female who presents today for a follow up after covid-19 infection   Patient reports that overall she is doing well but still has residual cough and shortness of breath on exertion  She did have an Chest X-ray 12/29/2021 which was normal  At the last office visit she was also started on Zoloft from anxiety and reports that she is not seeing much of an improvement in her symptoms  She is interested in seeing a therapist  She also reports that she has been having increasing acid reflux symptoms after eating pulled pork over the weekend  She was previously on Protonix 40 mg  but is now taking Omeprazole 20 mg daily  She states that she has had reflux for several years and has not had an endoscopy before  The following portions of the patient's history were reviewed and updated as appropriate: allergies, current medications, past family history, past medical history, past social history, past surgical history and problem list     Review of Systems   Constitutional: Negative  HENT: Negative  Eyes: Negative  Respiratory: Positive for cough and shortness of breath  Cardiovascular: Negative  Gastrointestinal:        Reflux   Genitourinary: Negative  Musculoskeletal: Negative  Skin: Negative  Neurological: Negative  Psychiatric/Behavioral: The patient is nervous/anxious  Objective:      /90 (BP Location: Left arm, Patient Position: Sitting, Cuff Size: Large)   Pulse 87   Temp 98 °F (36 7 °C) (Skin)   Ht 5' 2 5" (1 588 m)   Wt (!) 153 kg (337 lb)   SpO2 97%   BMI 60 66 kg/m²          Physical Exam  Constitutional:       General: She is not in acute distress  Appearance: Normal appearance  She is not ill-appearing  HENT:      Head: Normocephalic and atraumatic  Right Ear: Tympanic membrane normal       Left Ear: Tympanic membrane normal       Mouth/Throat:      Mouth: Mucous membranes are moist    Eyes:      General:         Right eye: No discharge  Left eye: No discharge  Extraocular Movements: Extraocular movements intact  Pupils: Pupils are equal, round, and reactive to light     Cardiovascular: Rate and Rhythm: Normal rate  Pulmonary:      Effort: Pulmonary effort is normal  No respiratory distress  Breath sounds: No wheezing  Musculoskeletal:      Cervical back: Normal range of motion and neck supple  Neurological:      General: No focal deficit present  Mental Status: She is alert     Psychiatric:         Mood and Affect: Mood normal          Behavior: Behavior normal

## 2022-01-11 NOTE — ASSESSMENT & PLAN NOTE
- Start trial of albuterol inhaler   - If symptoms persist consider obtaining pulmonary function testing and pulmonology referral

## 2022-01-11 NOTE — TELEPHONE ENCOUNTER
Pt came in and asked that her work note be faxed to [employer] at fax#[phone]  Fax was completed

## 2022-01-11 NOTE — LETTER
January 11, 2022     Patient: Evangelina Reese   YOB: 1990   Date of Visit: 1/11/2022       To Whom it May Concern:    Evangelina Reese is under my professional care  She was seen in my office on 1/11/2022  She is recovered from covid  She may return to work on 1/12/2022 without restrictions  If you have any questions or concerns, please don't hesitate to call           Sincerely,          Bijan Black MD

## 2022-01-11 NOTE — ASSESSMENT & PLAN NOTE
- Patient advised to increase Omeprazole to 40mg daily and will add Carafate 10mL 4 times a day   - We also reviewed the causes of heartburn and discussed importance of diet and lifestyle modifications to control GERD symptoms and avoiding things which worsen heartburn such as caffeine, tomato based products, spicy foods, tobacco, alcohol, obesity, tight fitting clothing   - Discussed importance of eating small, frequent meals instead of large meals      - Elevate head of the bed and do not lay down 2-3 hours following a meal   - If no improvement with medication and lifestyle modifications, consider referral to gastroenterology and EGD

## 2022-01-12 ENCOUNTER — PATIENT OUTREACH (OUTPATIENT)
Dept: FAMILY MEDICINE CLINIC | Facility: CLINIC | Age: 32
End: 2022-01-12

## 2022-01-14 ENCOUNTER — PATIENT OUTREACH (OUTPATIENT)
Dept: FAMILY MEDICINE CLINIC | Facility: CLINIC | Age: 32
End: 2022-01-14

## 2022-01-14 NOTE — PROGRESS NOTES
NIYAH ANDERSON received a referral from Dr Macdonald Section regarding patient's anxiety and possible interest in establishing mental health services  NIYAH ANDERSON has made multiple attempts to contact patient to provide assistance: attempts have been unsuccessful at this time  Unable to reach letter sent  Referral will be closed  NIYAH ANDERSON will be available if needed via new order

## 2022-01-14 NOTE — LETTER
[address] [facility] [id number]    Re: Mattie Level to reach   1/14/2022       Dear Felicia Kiran,    We tried to reach you by phone and were unfortunately unable to reach you  It is important that you contact the [facility] [address] at [phone]      Sincerely,         Bridget Franco MSW, LSW

## 2022-01-18 ENCOUNTER — OFFICE VISIT (OUTPATIENT)
Dept: FAMILY MEDICINE CLINIC | Facility: CLINIC | Age: 32
End: 2022-01-18
Payer: COMMERCIAL

## 2022-01-18 VITALS
WEIGHT: 293 LBS | DIASTOLIC BLOOD PRESSURE: 70 MMHG | HEART RATE: 90 BPM | OXYGEN SATURATION: 98 % | BODY MASS INDEX: 48.82 KG/M2 | SYSTOLIC BLOOD PRESSURE: 120 MMHG | TEMPERATURE: 97.5 F | HEIGHT: 65 IN

## 2022-01-18 DIAGNOSIS — R35.0 URINARY FREQUENCY: Primary | ICD-10-CM

## 2022-01-18 DIAGNOSIS — F41.1 GENERALIZED ANXIETY DISORDER: ICD-10-CM

## 2022-01-18 DIAGNOSIS — H60.592 OTHER NONINFECTIVE ACUTE OTITIS EXTERNA OF LEFT EAR: ICD-10-CM

## 2022-01-18 PROBLEM — H60.90 OTITIS EXTERNA: Status: ACTIVE | Noted: 2022-01-18

## 2022-01-18 PROBLEM — H66.90 OTITIS MEDIA: Status: RESOLVED | Noted: 2021-12-10 | Resolved: 2022-01-18

## 2022-01-18 LAB
SL AMB  POCT GLUCOSE, UA: NORMAL
SL AMB LEUKOCYTE ESTERASE,UA: ABNORMAL
SL AMB POCT BILIRUBIN,UA: ABNORMAL
SL AMB POCT BLOOD,UA: ABNORMAL
SL AMB POCT CLARITY,UA: ABNORMAL
SL AMB POCT COLOR,UA: YELLOW
SL AMB POCT KETONES,UA: ABNORMAL
SL AMB POCT NITRITE,UA: ABNORMAL
SL AMB POCT PH,UA: 6
SL AMB POCT SPECIFIC GRAVITY,UA: 1.02
SL AMB POCT URINE HCG: ABNORMAL
SL AMB POCT URINE PROTEIN: ABNORMAL
SL AMB POCT UROBILINOGEN: 1

## 2022-01-18 PROCEDURE — 99213 OFFICE O/P EST LOW 20 MIN: CPT | Performed by: FAMILY MEDICINE

## 2022-01-18 PROCEDURE — 81002 URINALYSIS NONAUTO W/O SCOPE: CPT | Performed by: FAMILY MEDICINE

## 2022-01-18 PROCEDURE — 81025 URINE PREGNANCY TEST: CPT | Performed by: FAMILY MEDICINE

## 2022-01-18 RX ORDER — NITROFURANTOIN 25; 75 MG/1; MG/1
100 CAPSULE ORAL 2 TIMES DAILY
Qty: 10 CAPSULE | Refills: 0 | Status: SHIPPED | OUTPATIENT
Start: 2022-01-18 | End: 2022-01-23

## 2022-01-18 RX ORDER — CIPROFLOXACIN AND DEXAMETHASONE 3; 1 MG/ML; MG/ML
4 SUSPENSION/ DROPS AURICULAR (OTIC) 2 TIMES DAILY
Qty: 7.5 ML | Refills: 0 | Status: SHIPPED | OUTPATIENT
Start: 2022-01-18 | End: 2022-01-20 | Stop reason: SDUPTHER

## 2022-01-18 RX ORDER — CIPROFLOXACIN AND DEXAMETHASONE 3; 1 MG/ML; MG/ML
4 SUSPENSION/ DROPS AURICULAR (OTIC) 2 TIMES DAILY
Qty: 7.5 ML | Refills: 0 | Status: SHIPPED | OUTPATIENT
Start: 2022-01-18 | End: 2022-01-18

## 2022-01-18 NOTE — ASSESSMENT & PLAN NOTE
- No improvement with current dose of Zoloft; discussed that the option of increasing to 50 mg daily and patient is agreeable at this time     - Start Zoloft 50 mg daily; follow up in 4-6 weeks

## 2022-01-18 NOTE — ASSESSMENT & PLAN NOTE
Presentation and exam suspicious for uncomplicated UTI  No concerns for extension of the infection beyond the bladder at this time  Urine dipstick positive for leukocyte esterase and 50 RBCs   Urine hCG negative     - Will treat with Macrobid 100 mg BID for 5 days  - Will send urine for culture

## 2022-01-18 NOTE — PROGRESS NOTES
Assessment/Plan:    Urinary frequency  Presentation and exam suspicious for uncomplicated UTI  No concerns for extension of the infection beyond the bladder at this time  Urine dipstick positive for leukocyte esterase and 50 RBCs  Urine hCG negative     - Will treat with Macrobid 100 mg BID for 5 days  - Will send urine for culture    Otitis externa  - Start 7 day course of ciprodex; patient to call should symptoms fail to resolve or worsen  - Cautioned against the use of q-tips in the ear     Generalized anxiety disorder  - No improvement with current dose of Zoloft; discussed that the option of increasing to 50 mg daily and patient is agreeable at this time     - Start Zoloft 50 mg daily; follow up in 4-6 weeks        Diagnoses and all orders for this visit:    Urinary frequency  -     POCT urine dip  -     POCT urine HCG  -     Cancel: Urine culture  -     Cancel: Pregnancy Test, Urine  -     nitrofurantoin (MACROBID) 100 mg capsule; Take 1 capsule (100 mg total) by mouth 2 (two) times a day for 5 days  -     Pregnancy Test, Urine  -     Urine culture    Generalized anxiety disorder  -     sertraline (Zoloft) 50 mg tablet; Take 1 tablet (50 mg total) by mouth daily  -     Urine culture    Other noninfective acute otitis externa of left ear  -     Discontinue: ciprofloxacin-dexamethasone (CIPRODEX) otic suspension; Administer 4 drops to the right ear 2 (two) times a day for 7 days  -     Urine culture  -     ciprofloxacin-dexamethasone (CIPRODEX) otic suspension; Administer 4 drops into the left ear 2 (two) times a day for 7 days          Subjective:      Patient ID: Carrie Jefferson is a 32 y o  female  HPI     Carrie Jefferson is a 32year old female with a past medical history of hypertension, obesity, GERD and type 2 diabetes mellitus who presents today for a same day visit  Today patient is presenting with multiple complaints   Firstly patient has has been experiencing 2 days of left ear pain with some associated clear drainage  Patient denies any other symptoms such as hearing loss or ringing in the ears  She does admit to using q-tips to clean her ears  Patient also reports 4 day history of urinary frequency and vaginal itching  Patient also reports that her period is 10 days late  She denies any other urinary symptoms such as dysuria, pelvic, flank pain, or vaginal discharge  She also reports no improvement of her anxiety on her current dose of Zoloft  At our previous office visit patient was referred to the social work care management program in order to connect patient with mental health resources in the community however attempts to reach the patient have been unsuccessful  Review of Systems   Constitutional: Negative for chills and fever  HENT: Positive for ear discharge and ear pain  Negative for hearing loss  Eyes: Negative  Respiratory: Negative  Cardiovascular: Negative  Gastrointestinal: Negative  Genitourinary: Positive for frequency  Negative for dysuria, vaginal discharge and vaginal pain  Musculoskeletal: Negative  Neurological: Negative  Psychiatric/Behavioral: The patient is nervous/anxious  Objective:      /70 (BP Location: Left arm, Patient Position: Sitting, Cuff Size: Large)   Pulse 90   Temp 97 5 °F (36 4 °C) (Skin)   Ht 5' 5" (1 651 m)   Wt (!) 155 kg (341 lb)   SpO2 98%   BMI 56 75 kg/m²          Physical Exam  Constitutional:       General: She is not in acute distress  Appearance: Normal appearance  HENT:      Head: Normocephalic and atraumatic  Right Ear: Tympanic membrane and external ear normal  There is no impacted cerumen  Left Ear: Tympanic membrane normal       Ears:      Comments: Mildly erythematous external ear canal; mastoid tenderness     Mouth/Throat:      Mouth: Mucous membranes are moist    Eyes:      General:         Right eye: No discharge  Left eye: No discharge        Extraocular Movements: Extraocular movements intact  Cardiovascular:      Rate and Rhythm: Normal rate  Pulmonary:      Effort: Pulmonary effort is normal  No respiratory distress  Musculoskeletal:      Cervical back: Normal range of motion and neck supple  Neurological:      General: No focal deficit present  Mental Status: She is alert     Psychiatric:         Mood and Affect: Mood normal

## 2022-01-18 NOTE — ASSESSMENT & PLAN NOTE
- Start 7 day course of ciprodex; patient to call should symptoms fail to resolve or worsen  - Cautioned against the use of q-tips in the ear

## 2022-01-20 ENCOUNTER — OFFICE VISIT (OUTPATIENT)
Dept: FAMILY MEDICINE CLINIC | Facility: CLINIC | Age: 32
End: 2022-01-20
Payer: COMMERCIAL

## 2022-01-20 VITALS
OXYGEN SATURATION: 98 % | WEIGHT: 293 LBS | HEIGHT: 62 IN | HEART RATE: 120 BPM | BODY MASS INDEX: 53.92 KG/M2 | TEMPERATURE: 97.6 F | DIASTOLIC BLOOD PRESSURE: 90 MMHG | SYSTOLIC BLOOD PRESSURE: 138 MMHG

## 2022-01-20 DIAGNOSIS — R10.11 RUQ PAIN: Primary | ICD-10-CM

## 2022-01-20 DIAGNOSIS — H60.592 OTHER NONINFECTIVE ACUTE OTITIS EXTERNA OF LEFT EAR: ICD-10-CM

## 2022-01-20 PROCEDURE — 3008F BODY MASS INDEX DOCD: CPT | Performed by: FAMILY MEDICINE

## 2022-01-20 PROCEDURE — 99213 OFFICE O/P EST LOW 20 MIN: CPT | Performed by: FAMILY MEDICINE

## 2022-01-20 PROCEDURE — 1036F TOBACCO NON-USER: CPT | Performed by: FAMILY MEDICINE

## 2022-01-20 RX ORDER — CIPROFLOXACIN AND DEXAMETHASONE 3; 1 MG/ML; MG/ML
4 SUSPENSION/ DROPS AURICULAR (OTIC) 2 TIMES DAILY
Qty: 7.5 ML | Refills: 0 | Status: SHIPPED | OUTPATIENT
Start: 2022-01-20 | End: 2022-05-06 | Stop reason: ALTCHOICE

## 2022-01-20 NOTE — LETTER
January 20, 2022     Patient: Manuela Wilson   YOB: 1990   Date of Visit: 1/20/2022       To Whom it May Concern:    Manuela Wilson is under my professional care  She was seen in my office on 1/20/2022  She may return to work on 1/21/2022  If you have any questions or concerns, please don't hesitate to call           Sincerely,          Kaelyn Ashley MD

## 2022-01-20 NOTE — PROGRESS NOTES
Assessment/Plan:    RUQ pain  - Ultrasound from [facility] reviewed which showed cholelithiaisis and mild hepatomegaly  - Will obtain repeat ultrasound given current symptoms   - Advised patient to refrain from fried and fatty foods which may exacerbate symptoms        Diagnoses and all orders for this visit:    RUQ pain  -     US abdomen complete; Future    Other noninfective acute otitis externa of left ear  -     ciprofloxacin-dexamethasone (CIPRODEX) otic suspension; Administer 4 drops into the left ear 2 (two) times a day for 7 days          Subjective:      Patient ID: Savana Joseph is a 32 y o  female  HPI     Savana Joseph is a 32year old female who presents today for a same day visit  Today patient is complaining of nausea and right upper quadrant pain which started yesterday  She also complains of diarrhea however states that has been going on for several months  She does have a history of gallstones as noted on ultrasound from [facility] November 2021  She notes that the symptoms are worse with fried greasy and fatty foods  She was seen in the office 1/18 for urinary frequency and started on a course of Macrobid  She reports that her urinary symptoms have improved  The following portions of the patient's history were reviewed and updated as appropriate: allergies, current medications, past family history, past medical history, past social history, past surgical history and problem list     Review of Systems   Constitutional: Negative for chills and fever  HENT: Negative  Eyes: Negative  Respiratory: Negative  Cardiovascular: Negative  Gastrointestinal: Positive for abdominal pain, diarrhea and nausea  Negative for vomiting  Genitourinary: Negative  Musculoskeletal: Negative  Skin: Negative  Neurological: Negative  Psychiatric/Behavioral: Negative            Objective:      /90   Pulse (!) 120   Temp 97 6 °F (36 4 °C) (Skin)   Ht 5' 2" (1 575 m)   Wt (!) 154 kg (339 lb 12 8 oz)   SpO2 98%   BMI 62 15 kg/m²          Physical Exam  Constitutional:       General: She is not in acute distress  Appearance: Normal appearance  HENT:      Head: Normocephalic and atraumatic  Eyes:      General:         Right eye: No discharge  Left eye: No discharge  Extraocular Movements: Extraocular movements intact  Cardiovascular:      Rate and Rhythm: Tachycardia present  Pulmonary:      Effort: Pulmonary effort is normal  No respiratory distress  Breath sounds: No wheezing  Abdominal:      General: Bowel sounds are normal       Tenderness: There is abdominal tenderness in the right upper quadrant  There is no guarding  Neurological:      General: No focal deficit present  Mental Status: She is alert     Psychiatric:         Mood and Affect: Mood normal          Behavior: Behavior normal

## 2022-01-21 PROBLEM — R10.11 RUQ PAIN: Status: ACTIVE | Noted: 2022-01-21

## 2022-01-21 LAB
BACTERIA UR CULT: NORMAL
HCG UR QL: NEGATIVE
Lab: NORMAL

## 2022-01-21 NOTE — ASSESSMENT & PLAN NOTE
- Ultrasound from [facility] reviewed which showed cholelithiaisis and mild hepatomegaly     - Will obtain repeat ultrasound given current symptoms   - Advised patient to refrain from fried and fatty foods which may exacerbate symptoms

## 2022-02-09 ENCOUNTER — TELEPHONE (OUTPATIENT)
Dept: FAMILY MEDICINE CLINIC | Facility: CLINIC | Age: 32
End: 2022-02-09

## 2022-02-09 DIAGNOSIS — I10 ESSENTIAL HYPERTENSION: ICD-10-CM

## 2022-02-09 DIAGNOSIS — D72.829 LEUKOCYTOSIS, UNSPECIFIED TYPE: ICD-10-CM

## 2022-02-09 DIAGNOSIS — R73.03 PRE-DIABETES: Primary | ICD-10-CM

## 2022-02-09 NOTE — TELEPHONE ENCOUNTER
Vitamin D being checked today, pt is asking if Dr Alireza Randhawa will want any other labs to be drawn?  If so can she order them so they can be drawn today with vitamin d

## 2022-04-06 ENCOUNTER — OFFICE VISIT (OUTPATIENT)
Dept: FAMILY MEDICINE CLINIC | Facility: CLINIC | Age: 32
End: 2022-04-06
Payer: COMMERCIAL

## 2022-04-06 VITALS
WEIGHT: 293 LBS | HEIGHT: 62 IN | BODY MASS INDEX: 53.92 KG/M2 | TEMPERATURE: 97.5 F | HEART RATE: 96 BPM | OXYGEN SATURATION: 96 %

## 2022-04-06 DIAGNOSIS — M72.2 PLANTAR FASCIITIS OF RIGHT FOOT: ICD-10-CM

## 2022-04-06 DIAGNOSIS — J06.9 UPPER RESPIRATORY TRACT INFECTION, UNSPECIFIED TYPE: Primary | ICD-10-CM

## 2022-04-06 PROBLEM — H60.90 OTITIS EXTERNA: Status: RESOLVED | Noted: 2022-01-18 | Resolved: 2022-04-06

## 2022-04-06 PROCEDURE — 99213 OFFICE O/P EST LOW 20 MIN: CPT | Performed by: FAMILY MEDICINE

## 2022-04-06 RX ORDER — SEMAGLUTIDE 1.34 MG/ML
0.25 INJECTION, SOLUTION SUBCUTANEOUS WEEKLY
COMMUNITY
Start: 2022-03-29

## 2022-04-06 RX ORDER — BENZONATATE 200 MG/1
200 CAPSULE ORAL 3 TIMES DAILY PRN
Qty: 30 CAPSULE | Refills: 0 | Status: SHIPPED | OUTPATIENT
Start: 2022-04-06 | End: 2022-05-06 | Stop reason: ALTCHOICE

## 2022-04-06 NOTE — ASSESSMENT & PLAN NOTE
- Patient may take OTC analgesics for the pain and may also benefit from wearing shoe inserts  Also discussed applying ice to the area by filling up a water bottle, freezing it and then rolling water bottle under the foot for 10 minutes in the morning and after work (further information provided in the AVS)  - Follow up appointment with podiatry scheduled 4/25

## 2022-04-06 NOTE — ASSESSMENT & PLAN NOTE
- Recommend supportive care at this time; patient advised to stay hydrated and can use OTC cold medications, neti pot and nasal sprays for congestion  Will also prescribe tessalon perles for cough and cepacol lozenges for sore throat  She will call if symptoms fail to resolve or worsen

## 2022-04-06 NOTE — PATIENT INSTRUCTIONS
Plantar Fasciitis   WHAT YOU NEED TO KNOW:   PF is swelling of the plantar fascia  The plantar fascia is a thick band of tissue that connects your heel bone to your toes  This part of your foot helps support the arch of your foot and absorbs shock  DISCHARGE INSTRUCTIONS:   Call your doctor if:   · Your pain or swelling suddenly increase  · You develop knee, hip, or back pain  · You have questions or concerns about your condition or care  Medicines: You may  need any of the following:  · Acetaminophen  decreases pain and fever  It is available without a doctor's order  Ask how much to take and how often to take it  Follow directions  Read the labels of all other medicines you are using to see if they also contain acetaminophen, or ask your doctor or pharmacist  Acetaminophen can cause liver damage if not taken correctly  Do not use more than 4 grams (4,000 milligrams) total of acetaminophen in one day  · NSAIDs , such as ibuprofen, help decrease swelling, pain, and fever  NSAIDs can cause stomach bleeding or kidney problems in certain people  If you take blood thinner medicine, always ask your healthcare provider if NSAIDs are safe for you  Always read the medicine label and follow directions  · Take your medicine as directed  Contact your healthcare provider if you think your medicine is not helping or if you have side effects  Tell him of her if you are allergic to any medicine  Keep a list of the medicines, vitamins, and herbs you take  Include the amounts, and when and why you take them  Bring the list or the pill bottles to follow-up visits  Carry your medicine list with you in case of an emergency  Self-care:   · Wear your splint or shoe inserts as directed  You may need to wear a splint at night to keep your foot stretched while you sleep  This will help prevent sharp pain first thing in the morning   Shoe inserts will help decrease stress on your plantar fascia when you walk or exercise  TRY GETTING SPENCO INSOLES     · Apply ice on your plantar fascia  Ice helps prevent tissue damage and decreases swelling and pain  Fill a water bottle with water and freeze it  Wrap a towel around the bottle or cover it with a pillow case  Roll the water bottle under your foot for 10 minutes in the morning and after work  · Massage your plantar fascia as directed  This may help decrease swelling and pain  Roll a golf ball under your foot for 10 minutes  Repeat 3 times each day  · Go to physical therapy as directed  A physical therapist teaches you exercises to help improve movement and strength, and to decrease pain  Prevent plantar fasciitis:   · Maintain a healthy weight  This will help decrease stress on your feet  Ask your healthcare provider how much you should weigh  Ask him to help you create a weight loss plan if you are overweight  · Do low-impact exercises  Low-impact exercises decrease stress on your plantar fascia  Examples include swimming or bicycling  · Start new activities slowly  Increase the intensity and time gradually  · Wear shoes that fit well and support your arch  Replace your shoes before the padding or shock absorption wears out  Do not walk or  bare feet or sandals for long periods of time  Follow up with your doctor as directed:  Write down your questions so you remember to ask them during your visits  © Copyright [employer] 2022 Information is for End User's use only and may not be sold, redistributed or otherwise used for commercial purposes  All illustrations and images included in CareNotes® are the copyrighted property of A D A M , Inc  or [facility] Zack Rader   The above information is an  only  It is not intended as medical advice for individual conditions or treatments  Talk to your doctor, nurse or pharmacist before following any medical regimen to see if it is safe and effective for you

## 2022-04-06 NOTE — PROGRESS NOTES
Assessment/Plan:    Upper respiratory tract infection  - Recommend supportive care at this time; patient advised to stay hydrated and can use OTC cold medications, neti pot and nasal sprays for congestion  Will also prescribe tessalon perles for cough and cepacol lozenges for sore throat  She will call if symptoms fail to resolve or worsen  Plantar fasciitis of right foot  - Patient may take OTC analgesics for the pain and may also benefit from wearing shoe inserts  Also discussed applying ice to the area by filling up a water bottle, freezing it and then rolling water bottle under the foot for 10 minutes in the morning and after work (further information provided in the AVS)  - Follow up appointment with podiatry scheduled 4/25  Diagnoses and all orders for this visit:    Upper respiratory tract infection, unspecified type  -     benzonatate (TESSALON) 200 MG capsule; Take 1 capsule (200 mg total) by mouth 3 (three) times a day as needed for cough  -     menthol-cetylpyridinium (CEPACOL) 3 MG lozenge; Take 1 lozenge (3 mg total) by mouth as needed for sore throat    Plantar fasciitis of right foot    Other orders  -     Semaglutide,0 25 or 0 5MG/DOS, (Ozempic, 0 25 or 0 5 MG/DOSE,) 2 MG/1 5ML SOPN; Inject 0 25 mg under the skin Once a week  -     Cholecalciferol 100 MCG (4000 UT) CAPS; Take by mouth daily          Subjective:      Patient ID: Zoya Hernandez is a 28 y o  female  URI   This is a new problem  Episode onset: 4 days ago  The problem has been unchanged  There has been no fever  Associated symptoms include congestion, coughing, ear pain, sinus pain and a sore throat  She has tried NSAIDs for the symptoms  The treatment provided no relief  History is positive for sick contacts at home  Patient states that her daughter was sick last week with similar symptoms and diagnosed as having a URI       Patient is also complaining of pain at the bottom of the right foot which started approximately one month ago, she notes that the pain is worse in the mornings  She has an upcoming appointment with podiatry scheduled 4/25  The following portions of the patient's history were reviewed and updated as appropriate: allergies, current medications, past family history, past medical history, past social history, past surgical history and problem list     Review of Systems   Constitutional: Negative for chills and fever  HENT: Positive for congestion, ear pain, sinus pain and sore throat  Eyes: Negative  Respiratory: Positive for cough  Cardiovascular: Negative  Gastrointestinal: Negative  Genitourinary: Negative  Musculoskeletal: Negative  Skin: Negative  Neurological: Negative  Psychiatric/Behavioral: Negative  Objective:      Pulse 96   Temp 97 5 °F (36 4 °C) (Skin)   Ht 5' 2" (1 575 m)   Wt (!) 151 kg (333 lb)   SpO2 96%   BMI 60 91 kg/m²          Physical Exam  Constitutional:       General: She is not in acute distress  Appearance: She is not ill-appearing  HENT:      Head: Normocephalic and atraumatic  Right Ear: Tympanic membrane normal       Left Ear: Tympanic membrane normal       Mouth/Throat:      Mouth: Mucous membranes are moist       Pharynx: No oropharyngeal exudate or posterior oropharyngeal erythema  Tonsils: No tonsillar exudate  Eyes:      Extraocular Movements:      Right eye: Normal extraocular motion  Left eye: Normal extraocular motion  Pupils: Pupils are equal, round, and reactive to light  Cardiovascular:      Rate and Rhythm: Normal rate  Pulmonary:      Effort: Pulmonary effort is normal  No respiratory distress  Breath sounds: No wheezing  Musculoskeletal:        Feet:    Neurological:      General: No focal deficit present  Mental Status: She is alert     Psychiatric:         Mood and Affect: Mood normal          Behavior: Behavior normal  negative Affect and characteristics of appearance, verbalizations, behaviors are appropriate

## 2022-04-17 DIAGNOSIS — F41.1 GENERALIZED ANXIETY DISORDER: ICD-10-CM

## 2022-04-19 DIAGNOSIS — Z30.41 ENCOUNTER FOR SURVEILLANCE OF CONTRACEPTIVE PILLS: ICD-10-CM

## 2022-04-19 RX ORDER — DROSPIRENONE AND ETHINYL ESTRADIOL 0.02-3(28)
1 KIT ORAL DAILY
Qty: 84 TABLET | Refills: 0 | Status: SHIPPED | OUTPATIENT
Start: 2022-04-19 | End: 2022-07-13 | Stop reason: SDUPTHER

## 2022-04-27 ENCOUNTER — OFFICE VISIT (OUTPATIENT)
Dept: FAMILY MEDICINE CLINIC | Facility: CLINIC | Age: 32
End: 2022-04-27
Payer: COMMERCIAL

## 2022-04-27 VITALS
TEMPERATURE: 97.8 F | BODY MASS INDEX: 53.92 KG/M2 | OXYGEN SATURATION: 99 % | HEART RATE: 99 BPM | WEIGHT: 293 LBS | HEIGHT: 62 IN | DIASTOLIC BLOOD PRESSURE: 72 MMHG | SYSTOLIC BLOOD PRESSURE: 124 MMHG

## 2022-04-27 DIAGNOSIS — K21.9 GASTROESOPHAGEAL REFLUX DISEASE WITHOUT ESOPHAGITIS: ICD-10-CM

## 2022-04-27 DIAGNOSIS — K52.9 GASTROENTERITIS: ICD-10-CM

## 2022-04-27 DIAGNOSIS — Z02.89 ENCOUNTER FOR PHYSICAL EXAMINATION RELATED TO EMPLOYMENT: Primary | ICD-10-CM

## 2022-04-27 DIAGNOSIS — R63.5 WEIGHT GAIN: ICD-10-CM

## 2022-04-27 PROBLEM — R35.0 URINARY FREQUENCY: Status: RESOLVED | Noted: 2022-01-18 | Resolved: 2022-04-27

## 2022-04-27 PROBLEM — J06.9 UPPER RESPIRATORY TRACT INFECTION: Status: RESOLVED | Noted: 2022-04-06 | Resolved: 2022-04-27

## 2022-04-27 PROCEDURE — 87636 SARSCOV2 & INF A&B AMP PRB: CPT | Performed by: FAMILY MEDICINE

## 2022-04-27 PROCEDURE — 1036F TOBACCO NON-USER: CPT | Performed by: FAMILY MEDICINE

## 2022-04-27 PROCEDURE — 99395 PREV VISIT EST AGE 18-39: CPT | Performed by: FAMILY MEDICINE

## 2022-04-27 PROCEDURE — 3008F BODY MASS INDEX DOCD: CPT | Performed by: FAMILY MEDICINE

## 2022-04-27 RX ORDER — FAMOTIDINE 20 MG/1
20 TABLET, FILM COATED ORAL 2 TIMES DAILY
Qty: 60 TABLET | Refills: 2 | Status: SHIPPED | OUTPATIENT
Start: 2022-04-27 | End: 2022-05-06 | Stop reason: ALTCHOICE

## 2022-04-27 RX ORDER — KETOCONAZOLE 20 MG/G
1 CREAM TOPICAL DAILY
COMMUNITY
Start: 2022-04-25 | End: 2022-07-13

## 2022-04-27 NOTE — PROGRESS NOTES
Assessment/Plan:    Gastroenteritis  - Likely viral gastroenteritis however will also test for influenza given work exposure  Recommend supportive care at this time with plenty of hydration, BRAT diet and zofran as needed for nausea  Gastroesophageal reflux disease without esophagitis  - Reviewed the causes of heartburn  - Discussed importance of diet and lifestyle modifications to control GERD symptoms  Avoid things which worsen heartburn (ex:  caffeine, tomato based products, spicy foods, tobacco, alcohol, obesity, tight fitting clothing ) Discussed importance of eating small, frequent meals instead of large meals, elevating the head of the bed and not lying down 2-3 hours following a meal   - Continue Carafate; will switch to Pepcid 20 mg BID  - We discussed referral to gastroenterology for EGD however patient declined         Encounter for physical examination related to employment  - Work physical form completed; see chart for copy       Diagnoses and all orders for this visit:    Encounter for physical examination related to employment    Gastroenteritis  -     Covid/Flu- Office Collect    Gastroesophageal reflux disease without esophagitis  -     famotidine (PEPCID) 20 mg tablet; Take 1 tablet (20 mg total) by mouth 2 (two) times a day    Weight gain  -     TSH, 3rd generation with Free T4 reflex; Future    Other orders  -     ketoconazole (NIZORAL) 2 % cream; Apply 1 application topically daily  -     urea (CARMOL) 10 % cream; Apply 1 application topically daily          Subjective:      Patient ID: Nidia eD is a 28 y o  female  HPI    Nidia De is a 28year old female with a past medical history of hypertension, type 2 diabetes mellitus, reflux and obesity who presents today for a physical for work  Patient currently works as an [employer] but will be working at a [employer]   She is also complaining of one day history of abdominal pain which is diffuse but worse in the epigastric region, nausea, diarrhea and muscle aches  She notes that several people at her current workplace have tested positive for the flu  History is also positive for sick contacts at home with her daughter experiencing minor cough and cold symptoms  She is also complaining of increase reflux  She has been on Prilosec for 3 years with little improvement even despite increasing the dose to 40 mg as previously recommended and most recently Carafate was added to her medication regimen  She does admit that her reflux is largely due to her dietary choices and current weight  The following portions of the patient's history were reviewed and updated as appropriate: allergies, current medications, past family history, past medical history, past social history, past surgical history and problem list     Review of Systems   Constitutional: Negative for chills, fever and weight loss  Weigh gain   HENT: Negative  Respiratory: Negative  Cardiovascular: Negative  Gastrointestinal: Positive for abdominal pain, diarrhea, flatus and nausea  Negative for anorexia, constipation, hematochezia, melena and vomiting  Reflux   Genitourinary: Negative for dysuria, frequency and hematuria  Musculoskeletal: Positive for arthralgias and myalgias  Neurological: Negative for headaches  Objective:      /72 (BP Location: Left arm, Patient Position: Sitting, Cuff Size: Standard)   Pulse 99   Temp 97 8 °F (36 6 °C) (Skin)   Ht 5' 2" (1 575 m)   Wt (!) 153 kg (336 lb 12 8 oz)   SpO2 99%   BMI 61 60 kg/m²          Physical Exam  Constitutional:       General: She is not in acute distress  Appearance: She is obese  She is not ill-appearing  HENT:      Head: Normocephalic and atraumatic  Mouth/Throat:      Mouth: Mucous membranes are moist       Pharynx: No oropharyngeal exudate  Eyes:      Extraocular Movements: Extraocular movements intact        Pupils: Pupils are equal, round, and reactive to light  Cardiovascular:      Rate and Rhythm: Normal rate  Heart sounds: Normal heart sounds  Pulmonary:      Effort: Pulmonary effort is normal  No respiratory distress  Breath sounds: No wheezing  Abdominal:      General: Bowel sounds are normal       Tenderness: There is generalized abdominal tenderness  Skin:     Findings: No rash  Neurological:      General: No focal deficit present  Mental Status: She is alert  Cranial Nerves: No cranial nerve deficit  Motor: No weakness  Psychiatric:         Mood and Affect: Mood normal  Mood is not anxious or depressed           Behavior: Behavior normal

## 2022-04-27 NOTE — ASSESSMENT & PLAN NOTE
- Likely viral gastroenteritis however will also test for influenza given work exposure  Recommend supportive care at this time with plenty of hydration, BRAT diet and zofran as needed for nausea

## 2022-04-27 NOTE — PROGRESS NOTES
BMI Counseling: Body mass index is 61 6 kg/m²  The BMI is above normal  Nutrition recommendations include decreasing overall calorie intake, 3-5 servings of fruits/vegetables daily, reducing fast food intake, consuming healthier snacks, decreasing soda and/or juice intake, moderation in carbohydrate intake and reducing intake of saturated fat and trans fat  Exercise recommendations include moderate aerobic physical activity for 150 minutes/week

## 2022-04-27 NOTE — ASSESSMENT & PLAN NOTE
- Reviewed the causes of heartburn  - Discussed importance of diet and lifestyle modifications to control GERD symptoms    Avoid things which worsen heartburn (ex:  caffeine, tomato based products, spicy foods, tobacco, alcohol, obesity, tight fitting clothing ) Discussed importance of eating small, frequent meals instead of large meals, elevating the head of the bed and not lying down 2-3 hours following a meal   - Continue Carafate; will switch to Pepcid 20 mg BID  - We discussed referral to gastroenterology for EGD however patient declined

## 2022-04-29 LAB
FLUAV RNA RESP QL NAA+PROBE: NEGATIVE
FLUBV RNA RESP QL NAA+PROBE: NEGATIVE
SARS-COV-2 RNA RESP QL NAA+PROBE: NEGATIVE

## 2022-04-30 ENCOUNTER — APPOINTMENT (OUTPATIENT)
Dept: LAB | Facility: HOSPITAL | Age: 32
End: 2022-04-30
Attending: FAMILY MEDICINE
Payer: COMMERCIAL

## 2022-04-30 ENCOUNTER — HOSPITAL ENCOUNTER (OUTPATIENT)
Dept: ULTRASOUND IMAGING | Facility: HOSPITAL | Age: 32
Discharge: HOME | End: 2022-04-30
Payer: COMMERCIAL

## 2022-04-30 DIAGNOSIS — R63.5 WEIGHT GAIN: ICD-10-CM

## 2022-04-30 DIAGNOSIS — D72.829 LEUKOCYTOSIS, UNSPECIFIED TYPE: ICD-10-CM

## 2022-04-30 DIAGNOSIS — I10 ESSENTIAL HYPERTENSION: ICD-10-CM

## 2022-04-30 DIAGNOSIS — R73.03 PRE-DIABETES: ICD-10-CM

## 2022-04-30 DIAGNOSIS — R10.11 RUQ PAIN: ICD-10-CM

## 2022-04-30 LAB
ANION GAP SERPL CALCULATED.3IONS-SCNC: 8 MMOL/L (ref 4–13)
BASOPHILS # BLD AUTO: 0.05 THOUSANDS/ΜL (ref 0–0.1)
BASOPHILS NFR BLD AUTO: 0 % (ref 0–1)
BUN SERPL-MCNC: 11 MG/DL (ref 5–25)
CALCIUM SERPL-MCNC: 8.6 MG/DL (ref 8.3–10.1)
CHLORIDE SERPL-SCNC: 102 MMOL/L (ref 100–108)
CHOLEST SERPL-MCNC: 181 MG/DL
CO2 SERPL-SCNC: 28 MMOL/L (ref 21–32)
CREAT SERPL-MCNC: 0.85 MG/DL (ref 0.6–1.3)
CREAT UR-MCNC: 283 MG/DL
EOSINOPHIL # BLD AUTO: 0.14 THOUSAND/ΜL (ref 0–0.61)
EOSINOPHIL NFR BLD AUTO: 1 % (ref 0–6)
ERYTHROCYTE [DISTWIDTH] IN BLOOD BY AUTOMATED COUNT: 19.1 % (ref 11.6–15.1)
EST. AVERAGE GLUCOSE BLD GHB EST-MCNC: 128 MG/DL
GFR SERPL CREATININE-BSD FRML MDRD: 90 ML/MIN/1.73SQ M
GLUCOSE P FAST SERPL-MCNC: 131 MG/DL (ref 65–99)
HBA1C MFR BLD: 6.1 %
HCT VFR BLD AUTO: 41.6 % (ref 34.8–46.1)
HDLC SERPL-MCNC: 52 MG/DL
HGB BLD-MCNC: 12.1 G/DL (ref 11.5–15.4)
IMM GRANULOCYTES # BLD AUTO: 0.1 THOUSAND/UL (ref 0–0.2)
IMM GRANULOCYTES NFR BLD AUTO: 1 % (ref 0–2)
LDLC SERPL CALC-MCNC: 95 MG/DL (ref 0–100)
LYMPHOCYTES # BLD AUTO: 1.3 THOUSANDS/ΜL (ref 0.6–4.47)
LYMPHOCYTES NFR BLD AUTO: 10 % (ref 14–44)
MCH RBC QN AUTO: 24 PG (ref 26.8–34.3)
MCHC RBC AUTO-ENTMCNC: 29.1 G/DL (ref 31.4–37.4)
MCV RBC AUTO: 82 FL (ref 82–98)
MICROALBUMIN UR-MCNC: 52.3 MG/L (ref 0–20)
MICROALBUMIN/CREAT 24H UR: 18 MG/G CREATININE (ref 0–30)
MONOCYTES # BLD AUTO: 0.57 THOUSAND/ΜL (ref 0.17–1.22)
MONOCYTES NFR BLD AUTO: 4 % (ref 4–12)
NEUTROPHILS # BLD AUTO: 11.27 THOUSANDS/ΜL (ref 1.85–7.62)
NEUTS SEG NFR BLD AUTO: 84 % (ref 43–75)
NRBC BLD AUTO-RTO: 0 /100 WBCS
PLATELET # BLD AUTO: 358 THOUSANDS/UL (ref 149–390)
PMV BLD AUTO: 9.7 FL (ref 8.9–12.7)
POTASSIUM SERPL-SCNC: 3.9 MMOL/L (ref 3.5–5.3)
RBC # BLD AUTO: 5.05 MILLION/UL (ref 3.81–5.12)
SODIUM SERPL-SCNC: 138 MMOL/L (ref 136–145)
TRIGL SERPL-MCNC: 168 MG/DL
TSH SERPL DL<=0.05 MIU/L-ACNC: 2.54 UIU/ML (ref 0.45–4.5)
WBC # BLD AUTO: 13.43 THOUSAND/UL (ref 4.31–10.16)

## 2022-04-30 PROCEDURE — 82043 UR ALBUMIN QUANTITATIVE: CPT

## 2022-04-30 PROCEDURE — 83036 HEMOGLOBIN GLYCOSYLATED A1C: CPT

## 2022-04-30 PROCEDURE — 80061 LIPID PANEL: CPT

## 2022-04-30 PROCEDURE — 84443 ASSAY THYROID STIM HORMONE: CPT

## 2022-04-30 PROCEDURE — 36415 COLL VENOUS BLD VENIPUNCTURE: CPT

## 2022-04-30 PROCEDURE — 80048 BASIC METABOLIC PNL TOTAL CA: CPT

## 2022-04-30 PROCEDURE — 85025 COMPLETE CBC W/AUTO DIFF WBC: CPT

## 2022-04-30 PROCEDURE — 76700 US EXAM ABDOM COMPLETE: CPT

## 2022-04-30 PROCEDURE — 82570 ASSAY OF URINE CREATININE: CPT

## 2022-05-05 NOTE — TELEPHONE ENCOUNTER
Patient called requesting a refill on omeprazole (PriLOSEC) 20 mg delayed release capsule   To be refilled at [facility] pharmacy in [place]  Please advise

## 2022-05-05 NOTE — TELEPHONE ENCOUNTER
Pt was prescribed Famotidine 20mg on 04/27/2022 and I spoke with pt and she stated that medication is not helping and she would like to go back on Omeprazole 20mg because that is the only one that work for her, but that medication is not her current medication list         Please Advise

## 2022-05-06 DIAGNOSIS — K21.9 GASTROESOPHAGEAL REFLUX DISEASE WITHOUT ESOPHAGITIS: Primary | ICD-10-CM

## 2022-05-06 RX ORDER — OMEPRAZOLE 20 MG/1
20 CAPSULE, DELAYED RELEASE ORAL DAILY
Qty: 30 CAPSULE | Refills: 1 | Status: SHIPPED | OUTPATIENT
Start: 2022-05-06 | End: 2022-05-28

## 2022-05-06 NOTE — TELEPHONE ENCOUNTER
I sent the Omeprazole to the pharmacy but if she is still having so much reflux she should be evaluated by GI   Thank you

## 2022-05-19 ENCOUNTER — OFFICE VISIT (OUTPATIENT)
Dept: FAMILY MEDICINE CLINIC | Facility: CLINIC | Age: 32
End: 2022-05-19
Payer: COMMERCIAL

## 2022-05-19 VITALS
BODY MASS INDEX: 53.92 KG/M2 | WEIGHT: 293 LBS | SYSTOLIC BLOOD PRESSURE: 144 MMHG | HEART RATE: 88 BPM | OXYGEN SATURATION: 97 % | HEIGHT: 62 IN | DIASTOLIC BLOOD PRESSURE: 92 MMHG | TEMPERATURE: 97.3 F | RESPIRATION RATE: 16 BRPM

## 2022-05-19 DIAGNOSIS — K58.2 IRRITABLE BOWEL SYNDROME WITH BOTH CONSTIPATION AND DIARRHEA: ICD-10-CM

## 2022-05-19 DIAGNOSIS — K52.9 GASTROENTERITIS: Primary | ICD-10-CM

## 2022-05-19 DIAGNOSIS — K80.20 CALCULUS OF GALLBLADDER WITHOUT CHOLECYSTITIS WITHOUT OBSTRUCTION: ICD-10-CM

## 2022-05-19 DIAGNOSIS — J02.9 SORE THROAT: ICD-10-CM

## 2022-05-19 DIAGNOSIS — K21.9 GASTROESOPHAGEAL REFLUX DISEASE WITHOUT ESOPHAGITIS: ICD-10-CM

## 2022-05-19 DIAGNOSIS — R10.11 RUQ PAIN: ICD-10-CM

## 2022-05-19 PROCEDURE — 3725F SCREEN DEPRESSION PERFORMED: CPT | Performed by: FAMILY MEDICINE

## 2022-05-19 PROCEDURE — 3008F BODY MASS INDEX DOCD: CPT | Performed by: FAMILY MEDICINE

## 2022-05-19 PROCEDURE — 87070 CULTURE OTHR SPECIMN AEROBIC: CPT | Performed by: FAMILY MEDICINE

## 2022-05-19 PROCEDURE — 1036F TOBACCO NON-USER: CPT | Performed by: FAMILY MEDICINE

## 2022-05-19 PROCEDURE — 87636 SARSCOV2 & INF A&B AMP PRB: CPT | Performed by: FAMILY MEDICINE

## 2022-05-19 PROCEDURE — 99214 OFFICE O/P EST MOD 30 MIN: CPT | Performed by: FAMILY MEDICINE

## 2022-05-19 RX ORDER — VALSARTAN AND HYDROCHLOROTHIAZIDE 320; 25 MG/1; MG/1
1 TABLET, FILM COATED ORAL DAILY
COMMUNITY
Start: 2022-05-05

## 2022-05-19 RX ORDER — DICYCLOMINE HCL 20 MG
20 TABLET ORAL EVERY 6 HOURS
Qty: 30 TABLET | Refills: 1 | Status: SHIPPED | OUTPATIENT
Start: 2022-05-19 | End: 2022-07-13

## 2022-05-19 RX ORDER — SUCRALFATE ORAL 1 G/10ML
1 SUSPENSION ORAL
Qty: 420 ML | Refills: 0 | Status: SHIPPED | OUTPATIENT
Start: 2022-05-19

## 2022-05-19 NOTE — PROGRESS NOTES
Assessment/Plan:  Throat culture obtained and sent to lab and nasal swab for COVID-19/flu PCR testing obtained and sent to lab  Will heed results  Patient to remain home on quarantine until test results known  Patient was started on dicyclomine 20 mg 1 q 6 hours p r n  and continue Carafate and omeprazole  Recommend increase fluids follow a bland/brat diet  Discussed avoidance diet  Recommend rest   Patient to remain out of work today and tomorrow  Patient is again being referred to Gastroenterology for further evaluation treatment and encouraged to schedule  Return to the office next week or call sooner p r n  or report to the emergency room for worsening symptoms  Diagnoses and all orders for this visit:    Gastroenteritis  -     dicyclomine (BENTYL) 20 mg tablet; Take 1 tablet (20 mg total) by mouth every 6 (six) hours  -     Covid/Flu- Office Collect    Sore throat  -     Throat culture; Future  -     Covid/Flu- Office Collect    Irritable bowel syndrome with both constipation and diarrhea  -     Ambulatory Referral to Gastroenterology; Future    Gastroesophageal reflux disease without esophagitis  -     sucralfate (CARAFATE) 1 g/10 mL suspension; Take 10 mL (1 g total) by mouth 4 (four) times a day (with meals and at bedtime)  -     Ambulatory Referral to Gastroenterology; Future    Calculus of gallbladder without cholecystitis without obstruction  -     Ambulatory Referral to Gastroenterology; Future    RUQ pain  -     Ambulatory Referral to Gastroenterology; Future    Other orders  -     valsartan-hydrochlorothiazide (DIOVAN-HCT) 320-25 MG per tablet; Take 1 tablet by mouth in the morning  Subjective:      Patient ID: Lilian Carrel is a 28 y o  female  Patient has chronic abdominal pain has been evaluated on several occasions multiple treatments referred to Gastroenterology but has not scheduled appointment yet    She complains of generalized abdominal pain, nausea, vomiting and diarrhea especially after eating dairy products or fried fatty foods  Was diagnosed with cholelithiasis and IBS and GERD  Since yesterday she complains of cold symptoms including sore throat, nasal congestion and cough  She has treated this with Carafate and Pepto-Bismol without significant relief  Patient works at a  exposure to ill children  She denies known COVID exposure  Sore Throat   This is a new problem  The current episode started yesterday  The problem has been gradually worsening  There has been no fever  Associated symptoms include abdominal pain, congestion, diarrhea and vomiting  Pertinent negatives include no swollen glands or trouble swallowing  She has had no exposure to strep or mono  She has tried NSAIDs (cough drops) for the symptoms  The treatment provided mild relief  Diarrhea   This is a chronic problem  The problem occurs 5 to 10 times per day  The problem has been unchanged  The stool consistency is described as watery  Associated symptoms include abdominal pain, arthralgias, increased flatus, a URI and vomiting  Pertinent negatives include no bloating  The symptoms are aggravated by dairy products (fried foods)  Risk factors include ill contacts  She has tried bismuth subsalicylate and increased fluids (tums, carafate) for the symptoms  The treatment provided mild relief  Her past medical history is significant for irritable bowel syndrome  Nausea  This is a chronic problem  The problem occurs intermittently  The problem has been unchanged  Associated symptoms include abdominal pain, arthralgias, a change in bowel habit, congestion, nausea and vomiting  Pertinent negatives include no anorexia, swollen glands or urinary symptoms  Abdominal Pain  This is a new problem  The current episode started yesterday  The onset quality is sudden  The problem occurs constantly  The most recent episode lasted 2 hours  The problem has been gradually worsening   The pain is located in the epigastric region  The pain is at a severity of 5/10  The quality of the pain is sharp  The abdominal pain radiates to the epigastric region  Associated symptoms include arthralgias, belching, diarrhea, flatus, nausea and vomiting  Pertinent negatives include no anorexia, dysuria, frequency, hematochezia, hematuria or melena  The pain is aggravated by movement and vomiting  The pain is relieved by nothing  Prior diagnostic workup includes ultrasound  Her past medical history is significant for gallstones, GERD and irritable bowel syndrome  There is no history of abdominal surgery or PUD  The following portions of the patient's history were reviewed and updated as appropriate: allergies, current medications, past family history, past medical history, past social history, past surgical history and problem list     Review of Systems   HENT: Positive for congestion  Negative for trouble swallowing  Gastrointestinal: Positive for abdominal pain, change in bowel habit, diarrhea, flatus, nausea and vomiting  Negative for anorexia, bloating, hematochezia and melena  Genitourinary: Negative for dysuria, frequency and hematuria  Musculoskeletal: Positive for arthralgias  Objective:      /92   Pulse 88   Temp (!) 97 3 °F (36 3 °C) (Skin)   Resp 16   Ht 5' 2" (1 575 m)   Wt (!) 152 kg (335 lb)   LMP  (LMP Unknown)   SpO2 97%   BMI 61 27 kg/m²          Physical Exam  Constitutional:       General: She is not in acute distress  Appearance: Normal appearance  She is obese  She is not ill-appearing, toxic-appearing or diaphoretic  HENT:      Head: Normocephalic  Right Ear: Tympanic membrane normal       Left Ear: Tympanic membrane normal       Nose: Congestion present  Mouth/Throat:      Mouth: Mucous membranes are moist       Pharynx: Oropharynx is clear  Eyes:      General: No scleral icterus       Conjunctiva/sclera: Conjunctivae normal    Cardiovascular:      Rate and Rhythm: Normal rate and regular rhythm  Pulmonary:      Effort: Pulmonary effort is normal       Breath sounds: Normal breath sounds  Abdominal:      Palpations: Abdomen is soft  Tenderness: There is abdominal tenderness  There is no guarding or rebound  Comments: Mild diffuse tenderness  Musculoskeletal:      Cervical back: Neck supple  Right lower leg: No edema  Left lower leg: No edema  Lymphadenopathy:      Cervical: No cervical adenopathy  Skin:     General: Skin is warm and dry  Neurological:      General: No focal deficit present  Mental Status: She is alert and oriented to person, place, and time  Psychiatric:         Mood and Affect: Mood normal          Behavior: Behavior normal          Thought Content:  Thought content normal          Judgment: Judgment normal

## 2022-05-22 LAB — BACTERIA THROAT CULT: ABNORMAL

## 2022-05-23 DIAGNOSIS — J02.9 SORE THROAT: Primary | ICD-10-CM

## 2022-05-23 RX ORDER — CEPHALEXIN 500 MG/1
500 CAPSULE ORAL EVERY 6 HOURS SCHEDULED
Qty: 40 CAPSULE | Refills: 0 | Status: SHIPPED | OUTPATIENT
Start: 2022-05-23 | End: 2022-06-02

## 2022-05-28 DIAGNOSIS — K21.9 GASTROESOPHAGEAL REFLUX DISEASE WITHOUT ESOPHAGITIS: ICD-10-CM

## 2022-05-28 RX ORDER — OMEPRAZOLE 20 MG/1
CAPSULE, DELAYED RELEASE ORAL
Qty: 90 CAPSULE | Refills: 1 | Status: SHIPPED | OUTPATIENT
Start: 2022-05-28

## 2022-06-13 DIAGNOSIS — K58.2 IRRITABLE BOWEL SYNDROME WITH BOTH CONSTIPATION AND DIARRHEA: Primary | ICD-10-CM

## 2022-06-14 ENCOUNTER — APPOINTMENT (OUTPATIENT)
Dept: LAB | Facility: MEDICAL CENTER | Age: 32
End: 2022-06-14
Payer: COMMERCIAL

## 2022-06-14 DIAGNOSIS — K58.2 IRRITABLE BOWEL SYNDROME WITH BOTH CONSTIPATION AND DIARRHEA: ICD-10-CM

## 2022-06-14 PROCEDURE — 86258 DGP ANTIBODY EACH IG CLASS: CPT

## 2022-06-14 PROCEDURE — 36415 COLL VENOUS BLD VENIPUNCTURE: CPT

## 2022-06-14 PROCEDURE — 86231 EMA EACH IG CLASS: CPT

## 2022-06-14 PROCEDURE — 82784 ASSAY IGA/IGD/IGG/IGM EACH: CPT

## 2022-06-14 PROCEDURE — 86364 TISS TRNSGLTMNASE EA IG CLAS: CPT

## 2022-06-15 LAB
ENDOMYSIUM IGA SER QL: NEGATIVE
GLIADIN PEPTIDE IGA SER-ACNC: 3 UNITS (ref 0–19)
GLIADIN PEPTIDE IGG SER-ACNC: 5 UNITS (ref 0–19)
IGA SERPL-MCNC: 172 MG/DL (ref 87–352)
TTG IGA SER-ACNC: <2 U/ML (ref 0–3)
TTG IGG SER-ACNC: <2 U/ML (ref 0–5)

## 2022-07-13 ENCOUNTER — OFFICE VISIT (OUTPATIENT)
Dept: OBGYN CLINIC | Facility: CLINIC | Age: 32
End: 2022-07-13
Payer: COMMERCIAL

## 2022-07-13 VITALS — WEIGHT: 293 LBS | BODY MASS INDEX: 59.99 KG/M2 | SYSTOLIC BLOOD PRESSURE: 126 MMHG | DIASTOLIC BLOOD PRESSURE: 68 MMHG

## 2022-07-13 DIAGNOSIS — Z30.41 ENCOUNTER FOR SURVEILLANCE OF CONTRACEPTIVE PILLS: ICD-10-CM

## 2022-07-13 DIAGNOSIS — N90.89 VULVAR IRRITATION: Primary | ICD-10-CM

## 2022-07-13 DIAGNOSIS — R35.0 URINE FREQUENCY: ICD-10-CM

## 2022-07-13 LAB
SL AMB  POCT GLUCOSE, UA: ABNORMAL
SL AMB LEUKOCYTE ESTERASE,UA: ABNORMAL
SL AMB POCT BILIRUBIN,UA: ABNORMAL
SL AMB POCT BLOOD,UA: 1
SL AMB POCT CLARITY,UA: ABNORMAL
SL AMB POCT COLOR,UA: YELLOW
SL AMB POCT KETONES,UA: ABNORMAL
SL AMB POCT NITRITE,UA: ABNORMAL
SL AMB POCT PH,UA: 6
SL AMB POCT SPECIFIC GRAVITY,UA: 1.03
SL AMB POCT URINE PROTEIN: ABNORMAL
SL AMB POCT UROBILINOGEN: ABNORMAL

## 2022-07-13 PROCEDURE — 99214 OFFICE O/P EST MOD 30 MIN: CPT | Performed by: PHYSICIAN ASSISTANT

## 2022-07-13 PROCEDURE — 81002 URINALYSIS NONAUTO W/O SCOPE: CPT | Performed by: PHYSICIAN ASSISTANT

## 2022-07-13 PROCEDURE — 87086 URINE CULTURE/COLONY COUNT: CPT | Performed by: PHYSICIAN ASSISTANT

## 2022-07-13 RX ORDER — DROSPIRENONE AND ETHINYL ESTRADIOL 0.02-3(28)
1 KIT ORAL DAILY
Qty: 84 TABLET | Refills: 0 | Status: SHIPPED | OUTPATIENT
Start: 2022-07-13 | End: 2022-10-14 | Stop reason: SDUPTHER

## 2022-07-13 RX ORDER — NITROFURANTOIN 25; 75 MG/1; MG/1
100 CAPSULE ORAL 2 TIMES DAILY
Qty: 14 CAPSULE | Refills: 0 | Status: SHIPPED | OUTPATIENT
Start: 2022-07-13 | End: 2022-07-20

## 2022-07-13 RX ORDER — FLUCONAZOLE 150 MG/1
150 TABLET ORAL
Qty: 2 TABLET | Refills: 0 | Status: SHIPPED | OUTPATIENT
Start: 2022-07-13 | End: 2022-07-17

## 2022-07-13 RX ORDER — CLOBETASOL PROPIONATE 0.5 MG/G
OINTMENT TOPICAL 2 TIMES DAILY
Qty: 30 G | Refills: 0 | Status: SHIPPED | OUTPATIENT
Start: 2022-07-13

## 2022-07-13 NOTE — PROGRESS NOTES
Assessment/Plan:  - Start clobetasol, vulvar fusion of labia  - Start diflucan  - Recommend tight glycemic control  - +blood and +leuks on UA, start macrobid, culture sent  - will reach out with culture results  - RTO PRN       Diagnoses and all orders for this visit:    Vulvar irritation  -     clobetasol (TEMOVATE) 0 05 % ointment; Apply topically 2 (two) times a day  -     fluconazole (DIFLUCAN) 150 mg tablet; Take 1 tablet (150 mg total) by mouth every third day for 2 doses    Urine frequency  -     POCT urine dip  -     Urine culture  -     nitrofurantoin (MACROBID) 100 mg capsule; Take 1 capsule (100 mg total) by mouth 2 (two) times a day for 7 days    Encounter for surveillance of contraceptive pills  -     drospirenone-ethinyl estradiol (TATY) 3-0 02 MG per tablet; Take 1 tablet by mouth daily          Subjective:      Patient ID: Saida Sweet is a 28 y o  female  Jaci Schwab is a 35YO  WF presenint go the office with complaints of perineal irritaion x 1 week  She states it is red and irritated and burns when the urine runs over it  She states that she used aquafor with minimal relief  She states the pain is not significant enough to need to take any OTC NSAIDS  These symptoms started a week ago and she feels she may have had a cut from shaving  She denies vaginal irritation or itching or discharge  The following portions of the patient's history were reviewed and updated as appropriate: allergies, current medications, past family history, past medical history, past social history, past surgical history and problem list     Review of Systems   Constitutional: Negative for chills, fever and unexpected weight change  Respiratory: Negative for shortness of breath  Cardiovascular: Negative for chest pain  Gastrointestinal: Negative for abdominal pain, diarrhea, nausea and vomiting  Skin: Negative for rash           Objective:      /68   Wt (!) 149 kg (328 lb)   LMP 2022 (Exact Date)   Breastfeeding No   BMI 59 99 kg/m²          Physical Exam  Constitutional:       General: She is not in acute distress  Appearance: Normal appearance  She is obese  She is not ill-appearing, toxic-appearing or diaphoretic  HENT:      Head: Normocephalic and atraumatic  Cardiovascular:      Rate and Rhythm: Normal rate and regular rhythm  Heart sounds: No murmur heard  No friction rub  No gallop  Pulmonary:      Effort: Pulmonary effort is normal       Breath sounds: Normal breath sounds  Genitourinary:     Pubic Area: No rash  Labia:         Right: No rash or lesion  Left: No rash or lesion  Comments: Small 2mm laceration at introitus, moderate irritation noted at labial junction with fusion of labia minora  Skin:     General: Skin is warm and dry  Findings: No lesion or rash  Neurological:      General: No focal deficit present  Mental Status: She is alert     Psychiatric:         Mood and Affect: Mood normal          Behavior: Behavior normal

## 2022-07-15 LAB — BACTERIA UR CULT: NORMAL

## 2022-07-19 DIAGNOSIS — R73.03 PRE-DIABETES: Primary | ICD-10-CM

## 2022-07-21 ENCOUNTER — OFFICE VISIT (OUTPATIENT)
Dept: FAMILY MEDICINE CLINIC | Facility: CLINIC | Age: 32
End: 2022-07-21
Payer: COMMERCIAL

## 2022-07-21 VITALS
WEIGHT: 293 LBS | OXYGEN SATURATION: 99 % | HEIGHT: 62 IN | BODY MASS INDEX: 53.92 KG/M2 | HEART RATE: 86 BPM | TEMPERATURE: 97.8 F

## 2022-07-21 DIAGNOSIS — K52.9 GASTROENTERITIS: Primary | ICD-10-CM

## 2022-07-21 PROCEDURE — 99213 OFFICE O/P EST LOW 20 MIN: CPT | Performed by: FAMILY MEDICINE

## 2022-07-21 RX ORDER — ONDANSETRON 4 MG/1
4 TABLET, ORALLY DISINTEGRATING ORAL EVERY 6 HOURS PRN
Qty: 20 TABLET | Refills: 0 | Status: SHIPPED | OUTPATIENT
Start: 2022-07-21

## 2022-07-21 RX ORDER — DICYCLOMINE HYDROCHLORIDE 10 MG/1
10 CAPSULE ORAL
Qty: 30 CAPSULE | Refills: 0 | Status: SHIPPED | OUTPATIENT
Start: 2022-07-21

## 2022-07-21 NOTE — PROGRESS NOTES
Assessment/Plan:    Gastroenteritis  - Advised that most cases of gastroenteritis are self limited  Recommend supportive care at this time with plenty of hydration and adhering to the 'brat' diet to advance as tolerated  Zofran PRN for nausea and bentyl for abdominal cramping  Diagnoses and all orders for this visit:    Gastroenteritis  -     ondansetron (Zofran ODT) 4 mg disintegrating tablet; Take 1 tablet (4 mg total) by mouth every 6 (six) hours as needed for nausea or vomiting  -     dicyclomine (BENTYL) 10 mg capsule; Take 1 capsule (10 mg total) by mouth 4 (four) times a day (before meals and at bedtime)    Other orders  -     Diclofenac Sodium  MG 24 hr tablet; Take 100 mg by mouth daily          Subjective:      Patient ID: Jacqueline Castellon is a 28 y o  female  HPI     Jacqueline Castellon is a 28year old female who presents today with a chief complaint of food poisoning  Patient states that she went out to eat at [employer] and had the 'sampler' dish which no one else in her family had  In the early hours of this morning patient started to develop nausea, vomiting, abdominal pain and cramping  She states that she felt hot but denies any fevers  She also denies any blood or mucus in the stool  The following portions of the patient's history were reviewed and updated as appropriate: allergies, current medications, past family history, past medical history, past social history, past surgical history and problem list     Review of Systems   Constitutional: Negative  Negative for chills and fever  HENT: Negative  Eyes: Negative  Respiratory: Negative  Cardiovascular: Negative  Gastrointestinal: Positive for abdominal pain, diarrhea, nausea and vomiting  Negative for blood in stool  Genitourinary: Negative  Musculoskeletal: Negative  Skin: Negative  Neurological: Negative  Psychiatric/Behavioral: Negative            Objective:      Pulse 86   Temp 97 8 °F (36 6 °C) (Skin)   Ht 5' 2" (1 575 m)   Wt (!) 147 kg (324 lb)   LMP 07/01/2022 (Exact Date)   SpO2 99%   BMI 59 26 kg/m²          Physical Exam  Constitutional:       General: She is not in acute distress  Appearance: She is obese  She is not ill-appearing  HENT:      Head: Normocephalic and atraumatic  Eyes:      General:         Right eye: No discharge  Left eye: No discharge  Extraocular Movements: Extraocular movements intact  Cardiovascular:      Rate and Rhythm: Normal rate  Pulses: Normal pulses  Pulmonary:      Effort: Pulmonary effort is normal  No respiratory distress  Breath sounds: No wheezing  Abdominal:      Palpations: Abdomen is soft  Tenderness: There is generalized abdominal tenderness  There is no guarding  Musculoskeletal:      Right lower leg: No edema  Left lower leg: No edema  Neurological:      General: No focal deficit present  Mental Status: She is alert     Psychiatric:         Mood and Affect: Mood normal          Behavior: Behavior normal

## 2022-07-21 NOTE — ASSESSMENT & PLAN NOTE
- Advised that most cases of gastroenteritis are self limited  Recommend supportive care at this time with plenty of hydration and adhering to the 'brat' diet to advance as tolerated  Zofran PRN for nausea and bentyl for abdominal cramping

## 2022-07-21 NOTE — LETTER
July 21, 2022     Patient: Gudelia Ham  YOB: 1990  Date of Visit: 7/21/2022      To Whom it May Concern:    Gudelia Ham is under my professional care  Leane Mix was seen in my office on 7/21/2022  Leane Mix may return to work on 7/22/2022  If you have any questions or concerns, please don't hesitate to call           Sincerely,          Alexandra Neff MD

## 2022-09-09 DIAGNOSIS — B00.9 HSV (HERPES SIMPLEX VIRUS) INFECTION: Primary | ICD-10-CM

## 2022-09-09 RX ORDER — VALACYCLOVIR HYDROCHLORIDE 1 G/1
1000 TABLET, FILM COATED ORAL 2 TIMES DAILY
Qty: 14 TABLET | Refills: 0 | Status: SHIPPED | OUTPATIENT
Start: 2022-09-09 | End: 2022-09-16

## 2022-09-23 DIAGNOSIS — K21.9 GASTROESOPHAGEAL REFLUX DISEASE WITHOUT ESOPHAGITIS: ICD-10-CM

## 2022-09-23 RX ORDER — OMEPRAZOLE 20 MG/1
CAPSULE, DELAYED RELEASE ORAL
Qty: 90 CAPSULE | Refills: 2 | Status: SHIPPED | OUTPATIENT
Start: 2022-09-23

## 2022-10-11 PROBLEM — K52.9 GASTROENTERITIS: Status: RESOLVED | Noted: 2022-04-27 | Resolved: 2022-10-11

## 2022-10-14 DIAGNOSIS — Z30.41 ENCOUNTER FOR SURVEILLANCE OF CONTRACEPTIVE PILLS: ICD-10-CM

## 2022-10-14 RX ORDER — DROSPIRENONE AND ETHINYL ESTRADIOL 0.02-3(28)
1 KIT ORAL DAILY
Qty: 84 TABLET | Refills: 0 | Status: SHIPPED | OUTPATIENT
Start: 2022-10-14

## 2022-10-17 DIAGNOSIS — K21.9 GASTROESOPHAGEAL REFLUX DISEASE WITHOUT ESOPHAGITIS: ICD-10-CM

## 2022-10-17 RX ORDER — SUCRALFATE ORAL 1 G/10ML
1 SUSPENSION ORAL
Qty: 420 ML | Refills: 0 | Status: SHIPPED | OUTPATIENT
Start: 2022-10-17

## 2022-12-05 ENCOUNTER — ANNUAL EXAM (OUTPATIENT)
Dept: OBGYN CLINIC | Facility: CLINIC | Age: 32
End: 2022-12-05

## 2022-12-05 VITALS
DIASTOLIC BLOOD PRESSURE: 70 MMHG | WEIGHT: 293 LBS | HEIGHT: 62 IN | BODY MASS INDEX: 53.92 KG/M2 | SYSTOLIC BLOOD PRESSURE: 130 MMHG

## 2022-12-05 DIAGNOSIS — Z12.4 ENCOUNTER FOR SCREENING FOR MALIGNANT NEOPLASM OF CERVIX: ICD-10-CM

## 2022-12-05 DIAGNOSIS — Z11.51 SCREENING FOR HPV (HUMAN PAPILLOMAVIRUS): ICD-10-CM

## 2022-12-05 DIAGNOSIS — R30.0 DYSURIA: ICD-10-CM

## 2022-12-05 DIAGNOSIS — Z01.419 WELL WOMAN EXAM WITH ROUTINE GYNECOLOGICAL EXAM: Primary | ICD-10-CM

## 2022-12-05 DIAGNOSIS — Z30.41 ENCOUNTER FOR SURVEILLANCE OF CONTRACEPTIVE PILLS: ICD-10-CM

## 2022-12-05 PROBLEM — E11.42 DIABETIC POLYNEUROPATHY ASSOCIATED WITH TYPE 2 DIABETES MELLITUS (HCC): Status: ACTIVE | Noted: 2022-09-27

## 2022-12-05 PROBLEM — E11.42 TYPE 2 DIABETES MELLITUS WITH DIABETIC POLYNEUROPATHY, WITHOUT LONG-TERM CURRENT USE OF INSULIN (HCC): Status: ACTIVE | Noted: 2021-09-07

## 2022-12-05 LAB
SL AMB  POCT GLUCOSE, UA: NEGATIVE
SL AMB LEUKOCYTE ESTERASE,UA: NEGATIVE
SL AMB POCT BILIRUBIN,UA: NEGATIVE
SL AMB POCT BLOOD,UA: 10
SL AMB POCT CLARITY,UA: CLEAR
SL AMB POCT COLOR,UA: YELLOW
SL AMB POCT KETONES,UA: NEGATIVE
SL AMB POCT NITRITE,UA: NEGATIVE
SL AMB POCT PH,UA: 6
SL AMB POCT SPECIFIC GRAVITY,UA: 1.03
SL AMB POCT URINE PROTEIN: 15
SL AMB POCT UROBILINOGEN: 0.2

## 2022-12-05 RX ORDER — BUSPIRONE HYDROCHLORIDE 30 MG/1
30 TABLET ORAL 2 TIMES DAILY
COMMUNITY
Start: 2022-11-22 | End: 2022-12-14

## 2022-12-05 RX ORDER — BUSPIRONE HYDROCHLORIDE 15 MG/1
TABLET ORAL
COMMUNITY
Start: 2022-09-28 | End: 2022-12-14

## 2022-12-05 RX ORDER — METFORMIN HYDROCHLORIDE 500 MG/1
TABLET, EXTENDED RELEASE ORAL
COMMUNITY
Start: 2022-11-04 | End: 2022-12-14

## 2022-12-05 RX ORDER — DROSPIRENONE AND ETHINYL ESTRADIOL 0.02-3(28)
1 KIT ORAL DAILY
Qty: 84 TABLET | Refills: 3 | Status: SHIPPED | OUTPATIENT
Start: 2022-12-05

## 2022-12-05 RX ORDER — DULOXETIN HYDROCHLORIDE 60 MG/1
60 CAPSULE, DELAYED RELEASE ORAL DAILY
COMMUNITY
Start: 2022-11-22

## 2022-12-05 RX ORDER — DULOXETIN HYDROCHLORIDE 30 MG/1
30 CAPSULE, DELAYED RELEASE ORAL DAILY
COMMUNITY
Start: 2022-11-11 | End: 2022-12-14

## 2022-12-05 RX ORDER — BUSPIRONE HYDROCHLORIDE 30 MG/1
30 TABLET ORAL 2 TIMES DAILY
COMMUNITY
Start: 2022-11-22

## 2022-12-05 RX ORDER — DULOXETIN HYDROCHLORIDE 60 MG/1
60 CAPSULE, DELAYED RELEASE ORAL DAILY
COMMUNITY
Start: 2022-11-22 | End: 2022-12-14

## 2022-12-05 RX ORDER — NICOTINE POLACRILEX 2 MG
5 GUM BUCCAL DAILY
COMMUNITY
End: 2022-12-14

## 2022-12-05 RX ORDER — CLOTRIMAZOLE 1 %
CREAM (GRAM) TOPICAL
COMMUNITY
Start: 2022-12-01 | End: 2022-12-14

## 2022-12-05 NOTE — PROGRESS NOTES
ASSESSMENT & PLAN:   Diagnoses and all orders for this visit:    Well woman exam with routine gynecological exam  -     Liquid-based pap, screening    Encounter for screening for malignant neoplasm of cervix  -     Liquid-based pap, screening    Screening for HPV (human papillomavirus)  -     Liquid-based pap, screening    Dysuria  -     POCT urine dip auto non-scope    Encounter for surveillance of contraceptive pills  -     drospirenone-ethinyl estradiol (TATY) 3-0 02 MG per tablet; Take 1 tablet by mouth daily    BMI 50 0-59 9, adult ([facility] [address] )  Comments:  offered referral to bariatrics, pt declined at this time    Other orders  -     PREBIOTIC PRODUCT PO; Take by mouth daily  -     Biotin 1 MG CAPS; Take 5 mg by mouth daily  -     Biotin 5 MG TBDP  -     busPIRone (BUSPAR) 30 MG tablet; Take 30 mg by mouth 2 (two) times a day  -     busPIRone (BUSPAR) 30 MG tablet; Take 30 mg by mouth 2 (two) times a day  -     busPIRone (BUSPAR) 15 mg tablet; PLEASE SEE ATTACHED FOR DETAILED DIRECTIONS  -     clotrimazole (LOTRIMIN) 1 % cream; APPLY TOPICALLY 2 TIMES A DAY FOR 21 DAYS  -     metFORMIN (GLUCOPHAGE-XR) 500 mg 24 hr tablet  -     metFORMIN (GLUCOPHAGE-XR) 500 mg 24 hr tablet; Start one tab PO after dinner  After 1 week, add tab after breakfast  After 1 week, add 2nd tab after dinner, after another week, add  2nd tab after breakfast  Goal is two 500 mg tablets 2x/day  -     DULoxetine (CYMBALTA) 60 mg delayed release capsule; Take 60 mg by mouth daily  -     DULoxetine (CYMBALTA) 60 mg delayed release capsule; Take 60 mg by mouth daily  -     DULoxetine (CYMBALTA) 30 mg delayed release capsule; Take 30 mg by mouth daily          The following were reviewed in today's visit: ASCCP guidelines, Gardisil vaccination, STD testing breast self exam, STD testing, exercise and healthy diet  Patient to return to office in yearly for annual exam      All questions have been answered to her satisfaction          CC:  Annual Gynecologic Examination  Chief Complaint   Patient presents with   • Gynecologic Exam     Pt is here for a gyn exam  Pt is well, no concerns  Pap not indicated,  last pap 2020 neg/neg  HPI: Cynthia Jefferson is a 28 y o  Devorah Green who presents for annual gynecologic examination  She has the following concerns:  Frequent urination  Denies painful urination or other UTI symptoms      Health Maintenance:    Exercise: rarely  Breast exams/breast awareness: yes  Diet: trying to improve      Past Medical History:   Diagnosis Date   • Abnormal Pap smear of cervix    • Anxiety    • Diabetes ([address] )    • Eating disorder    • Gallstone    • Genital herpes    • History of gallstones 2018   • HPV (human papilloma virus) infection    • Hypertension    • Varicella     imm       Past Surgical History:   Procedure Laterality Date   •  SECTION     • COLPOSCOPY     • SC  DELIVERY ONLY N/A 2019    Procedure:  SECTION (); Surgeon: Reece Mcnamara MD;  Location[facility];  Service: Obstetrics       Past OB/Gyn History:  Period Duration (Days): 1-7  Period Pattern: Regular  Menstrual Flow: Light, Moderate, Heavy  Menstrual Control: Maxi padPatient's last menstrual period was 2022 (exact date)  Last Pap: 2020 : no abnormalities  History of abnormal Pap smear: yes  HPV vaccine completed: yes    Patient is currently sexually active     STD testing: no  Current contraception: OCP (estrogen/progesterone)      Family History  Family History   Problem Relation Age of Onset   • Hypertension Mother    • Hyperlipidemia Mother    • Asthma Mother    • Mental illness Mother    • Miscarriages / Stillbirths Mother    • Lung cancer Mother    • Liver cancer Mother    • Brain cancer Mother    • Pulmonary embolism Mother    • Hyperlipidemia Maternal Aunt    • Early death Maternal Aunt    • Brain cancer Maternal Aunt    • Lung cancer Maternal Aunt    • No Known Problems Father    • Drug abuse Brother    • Mental illness Brother    • Hypertension Maternal Grandmother    • Diabetes Maternal Grandmother         type 2   • Hearing loss Maternal Grandmother    • Early death Maternal Grandmother    • Stroke Maternal Grandmother    • Cervical cancer Maternal Grandmother    • No Known Problems Daughter        Family history of uterine or ovarian cancer: no  Family history of breast cancer: no  Family history of colon cancer: no    Social History:  Social History     Socioeconomic History   • Marital status: Single     Spouse name: Carol Acosta   • Number of children: Not on file   • Years of education: [employer]   • Highest education level: Not on file   Occupational History   • Occupation: MA   Tobacco Use   • Smoking status: Never   • Smokeless tobacco: Never   Vaping Use   • Vaping Use: Never used   Substance and Sexual Activity   • Alcohol use: Not Currently   • Drug use: Never   • Sexual activity: Yes     Partners: Male     Birth control/protection: OCP   Other Topics Concern   • Not on file   Social History Narrative   • Not on file     Social Determinants of Health     Financial Resource Strain: Not on file   Food Insecurity: Not on file   Transportation Needs: Not on file   Physical Activity: Not on file   Stress: Not on file   Social Connections: Not on file   Intimate Partner Violence: Not on file   Housing Stability: Not on file     Domestic violence screen: negative    Allergies:   Allergies   Allergen Reactions   • Erythromycin Shortness Of Breath   • Penicillins Hives and Other (See Comments)     Mom told pt she had reaction when she was an infant, but can have Amoxicillin     • Medical Tape Rash       Medications:    Current Outpatient Medications:   •  Biotin 1 MG CAPS, Take 5 mg by mouth daily, Disp: , Rfl:   •  Biotin 5 MG TBDP, , Disp: , Rfl:   •  busPIRone (BUSPAR) 15 mg tablet, PLEASE SEE ATTACHED FOR DETAILED DIRECTIONS, Disp: , Rfl:   •  busPIRone (BUSPAR) 30 MG tablet, Take 30 mg by mouth 2 (two) times a day, Disp: , Rfl:   •  busPIRone (BUSPAR) 30 MG tablet, Take 30 mg by mouth 2 (two) times a day, Disp: , Rfl:   •  clotrimazole (LOTRIMIN) 1 % cream, APPLY TOPICALLY 2 TIMES A DAY FOR 21 DAYS , Disp: , Rfl:   •  Diclofenac Sodium  MG 24 hr tablet, Take 100 mg by mouth daily, Disp: , Rfl:   •  drospirenone-ethinyl estradiol (TATY) 3-0 02 MG per tablet, Take 1 tablet by mouth daily, Disp: 84 tablet, Rfl: 3  •  DULoxetine (CYMBALTA) 30 mg delayed release capsule, Take 30 mg by mouth daily, Disp: , Rfl:   •  DULoxetine (CYMBALTA) 60 mg delayed release capsule, Take 60 mg by mouth daily, Disp: , Rfl:   •  DULoxetine (CYMBALTA) 60 mg delayed release capsule, Take 60 mg by mouth daily, Disp: , Rfl:   •  metFORMIN (GLUCOPHAGE-XR) 500 mg 24 hr tablet, , Disp: , Rfl:   •  metFORMIN (GLUCOPHAGE-XR) 500 mg 24 hr tablet, Start one tab PO after dinner    After 1 week, add tab after breakfast  After 1 week, add 2nd tab after dinner, after another week, add  2nd tab after breakfast  Goal is two 500 mg tablets 2x/day , Disp: , Rfl:   •  omeprazole (PriLOSEC) 20 mg delayed release capsule, TAKE 1 CAPSULE BY MOUTH EVERY DAY, Disp: 90 capsule, Rfl: 2  •  PREBIOTIC PRODUCT PO, Take by mouth daily, Disp: , Rfl:   •  sucralfate (CARAFATE) 1 g/10 mL suspension, Take 10 mL (1 g total) by mouth 4 (four) times a day (with meals and at bedtime), Disp: 420 mL, Rfl: 0  •  valsartan-hydrochlorothiazide (DIOVAN-HCT) 320-25 MG per tablet, TAKE 1 TABLET BY MOUTH EVERY DAY, Disp: 90 tablet, Rfl: 1  •  dicyclomine (BENTYL) 10 mg capsule, Take 1 capsule (10 mg total) by mouth 4 (four) times a day (before meals and at bedtime), Disp: 30 capsule, Rfl: 0  •  ondansetron (Zofran ODT) 4 mg disintegrating tablet, Take 1 tablet (4 mg total) by mouth every 6 (six) hours as needed for nausea or vomiting, Disp: 20 tablet, Rfl: 0  •  Semaglutide,0 25 or 0 5MG/DOS, (Ozempic, 0 25 or 0 5 MG/DOSE,) 2 MG/1 5ML SOPN, Inject 0 25 mg under the skin Once a week, Disp: , Rfl:   •  valACYclovir (VALTREX) 1,000 mg tablet, Take 1 tablet (1,000 mg total) by mouth 2 (two) times a day for 7 days, Disp: 14 tablet, Rfl: 0    Review of Systems:  Review of Systems   Constitutional: Negative for chills, fever and unexpected weight change  Respiratory: Negative for shortness of breath  Cardiovascular: Negative for chest pain  Gastrointestinal: Negative for abdominal pain, diarrhea, nausea and vomiting  Skin: Negative for rash  Psychiatric/Behavioral: Negative for dysphoric mood  The patient is not nervous/anxious  Physical Exam:  /70 (BP Location: Right arm, Patient Position: Sitting, Cuff Size: Standard)   Ht 5' 2" (1 575 m)   Wt (!) 139 kg (307 lb 6 4 oz)   LMP 11/13/2022 (Exact Date)   BMI 56 22 kg/m²    Physical Exam  Constitutional:       Appearance: Normal appearance  She is obese  Genitourinary:      Vulva and urethral meatus normal       No lesions in the vagina  Right Labia: No rash or lesions  Left Labia: No lesions or rash  No vaginal discharge, erythema or bleeding  Right Adnexa: not tender and no mass present  Left Adnexa: not tender and no mass present  No cervical discharge or lesion  Cervical exam comments: Tacked up anteriorly  Uterus is not tender  Breasts:     Breasts are symmetrical       Right: No mass or skin change  Left: No mass or skin change  HENT:      Head: Normocephalic and atraumatic  Cardiovascular:      Rate and Rhythm: Normal rate and regular rhythm  Heart sounds: Normal heart sounds  No murmur heard  No friction rub  No gallop  Pulmonary:      Effort: Pulmonary effort is normal       Breath sounds: Normal breath sounds  No wheezing, rhonchi or rales  Abdominal:      General: Abdomen is flat  There is no distension  Palpations: Abdomen is soft  Tenderness: There is no abdominal tenderness     Musculoskeletal:      Cervical back: Neck supple  Lymphadenopathy:      Upper Body:      Right upper body: No axillary adenopathy  Left upper body: No axillary adenopathy  Neurological:      General: No focal deficit present  Mental Status: She is alert  Skin:     General: Skin is warm and dry  Psychiatric:         Mood and Affect: Mood normal          Behavior: Behavior normal    Vitals reviewed

## 2022-12-07 LAB
BACTERIA UR CULT: ABNORMAL
BACTERIA UR CULT: ABNORMAL
HPV HR 12 DNA CVX QL NAA+PROBE: NEGATIVE
HPV16 DNA CVX QL NAA+PROBE: NEGATIVE
HPV18 DNA CVX QL NAA+PROBE: NEGATIVE

## 2022-12-09 LAB
LAB AP GYN PRIMARY INTERPRETATION: NORMAL
Lab: NORMAL

## 2022-12-14 ENCOUNTER — OFFICE VISIT (OUTPATIENT)
Dept: FAMILY MEDICINE CLINIC | Facility: CLINIC | Age: 32
End: 2022-12-14

## 2022-12-14 VITALS
TEMPERATURE: 97.9 F | SYSTOLIC BLOOD PRESSURE: 128 MMHG | OXYGEN SATURATION: 99 % | BODY MASS INDEX: 53.92 KG/M2 | HEART RATE: 102 BPM | HEIGHT: 62 IN | WEIGHT: 293 LBS | DIASTOLIC BLOOD PRESSURE: 86 MMHG | RESPIRATION RATE: 16 BRPM

## 2022-12-14 DIAGNOSIS — E66.01 CLASS 3 SEVERE OBESITY DUE TO EXCESS CALORIES WITH SERIOUS COMORBIDITY AND BODY MASS INDEX (BMI) OF 50.0 TO 59.9 IN ADULT (HCC): ICD-10-CM

## 2022-12-14 DIAGNOSIS — K21.9 GASTROESOPHAGEAL REFLUX DISEASE WITHOUT ESOPHAGITIS: ICD-10-CM

## 2022-12-14 DIAGNOSIS — I10 ESSENTIAL HYPERTENSION: ICD-10-CM

## 2022-12-14 DIAGNOSIS — E78.5 DYSLIPIDEMIA: ICD-10-CM

## 2022-12-14 DIAGNOSIS — G62.9 NEUROPATHY: ICD-10-CM

## 2022-12-14 DIAGNOSIS — F32.A DEPRESSION, UNSPECIFIED DEPRESSION TYPE: ICD-10-CM

## 2022-12-14 DIAGNOSIS — E11.42 TYPE 2 DIABETES MELLITUS WITH DIABETIC POLYNEUROPATHY, WITHOUT LONG-TERM CURRENT USE OF INSULIN (HCC): Primary | ICD-10-CM

## 2022-12-14 RX ORDER — AMITRIPTYLINE HYDROCHLORIDE 25 MG/1
25 TABLET, FILM COATED ORAL
Qty: 30 TABLET | Refills: 1 | Status: SHIPPED | OUTPATIENT
Start: 2022-12-14 | End: 2023-01-13

## 2022-12-14 RX ORDER — TIRZEPATIDE 2.5 MG/.5ML
2.5 INJECTION, SOLUTION SUBCUTANEOUS
COMMUNITY
Start: 2022-12-12

## 2022-12-14 NOTE — PROGRESS NOTES
Diabetic Foot Exam    Patient's shoes and socks removed  Right Foot/Ankle   Right Foot Inspection  Skin Exam: skin normal and skin intact  No dry skin, no warmth, no callus, no erythema, no maceration, no abnormal color, no pre-ulcer, no ulcer and no callus  Toe Exam: ROM and strength within normal limits  Sensory   Monofilament testing: diminished    Vascular  The right DP pulse is 2+  Left Foot/Ankle  Left Foot Inspection  Skin Exam: skin normal and skin intact  No dry skin, no warmth, no erythema, no maceration, normal color, no pre-ulcer, no ulcer and no callus  Toe Exam: ROM and strength within normal limits  Sensory   Monofilament testing: diminished    Vascular  The left DP pulse is 2+       Assign Risk Category  No deformity present  Loss of protective sensation  No weak pulses  Risk: 1

## 2022-12-14 NOTE — ASSESSMENT & PLAN NOTE
- Well controlled  - Unable to tolerate Metformin due to diarrhea  She was switched to [facility] by her previous provider    - Discussed healthy diet and regular exercise  - DM foot exam performed in offer today  She was referred to Podiatry given history of neuropathy    - Will continue to monitor     Lab Results   Component Value Date    HGBA1C 6 2 (H) 11/30/2022

## 2022-12-14 NOTE — ASSESSMENT & PLAN NOTE
- Not well controlled  - Will stop Cymbalta and start Elavil  - Referred to Podiatry  - Follow up in 1 month

## 2022-12-14 NOTE — ASSESSMENT & PLAN NOTE
- Total cholesterol 209, triglycerides 225, HDL well controlled at 59, and LDL stable at 105  - Discussed healthy diet and regular exercise  - Will continue to monitor fasting lipid panel

## 2022-12-14 NOTE — ASSESSMENT & PLAN NOTE
- Was previously on Ozempic and lost 28 pounds  - Switched to [employer]  - Discussed healthy diet and regular exercise  - She is currently seeing weight loss doctor through [facility]  - Will continue to monitor

## 2022-12-14 NOTE — PATIENT INSTRUCTIONS
Buspar - take once daily for next week  Then take every other day for next week  Then stop  Cymbalta - take every other day for next week then stop

## 2022-12-14 NOTE — ASSESSMENT & PLAN NOTE
- Previously on Cymbalta for anxiety/depression and neuropathy  Patient finding no benefit  - Provided instructions on how to wean off both medications  - Once off Cymbalta and Buspar, will start Elavil  Discussed this may help anxiety/depression, neuropathy, and headaches  - Recommend follow up in 1 month

## 2022-12-14 NOTE — ASSESSMENT & PLAN NOTE
- Stable with use of omeprazole  Continue same    - Avoid triggering food and beverage   - Advised symptoms may start to improve with weight loss  - Will continue to monitor  [address] [place] [address] Patient Status:  Outpatient in a Bed   Age/Gender 76year old female MRN [id number]   Location [address] [facility] Attending Jayshree Davidson MD   Hosp Day # 0 PCP Brooklyn Chacon MD       Anesthesia Post-op Note    LUMBAR 2 LUMBAR 3, LUMBAR 3 - LUMBAR 4, LUMBAR 4 - LUMBAR 5 DECOMPRESSION AND UNINSTRUMENTED FUSION    Procedure Summary     Date: 06/15/22 Room / Location: [facility] MAIN OR 11 / 1404 [facility] MAIN OR    Anesthesia Start: 0966 Anesthesia Stop: 4984    Procedure: LUMBAR 2 LUMBAR 3, LUMBAR 3 - LUMBAR 4, LUMBAR 4 - LUMBAR 5 DECOMPRESSION AND UNINSTRUMENTED FUSION (N/A Spine Lumbar) Diagnosis: (LUMBAR 3 - LUMBAR 4 STENOSIS, LUMBAR 2 - LUMBAR 5 STENOSIS)    Surgeons: Jayshree Davidson MD Anesthesiologist: Neena Sparks DO    Anesthesia Type: general ASA Status: 3          Anesthesia Type: general    Vitals Value Taken Time   /66 06/15/22 1540   Temp 98 06/15/22 1540   Pulse 80 06/15/22 1540   Resp 22 06/15/22 1540   SpO2 99 06/15/22 1540       Patient Location: PACU    Anesthesia Type: general    Airway Patency: patent    Postop Pain Control: adequate    Mental Status: mildly sedated but able to meaningfully participate in the post-anesthesia evaluation    Nausea/Vomiting: none    Cardiopulmonary/Hydration status: stable euvolemic    Complications: no apparent anesthesia related complications    Postop vital signs: stable    Comments: Mult level L2 through L5 lami decompression    Dental Exam: Unchanged from Preop    Patient to be discharged from PACU when criteria met.

## 2022-12-14 NOTE — PROGRESS NOTES
Name: Nay Faulkner      : 1990      MRN: [id number]  Encounter Provider: SHERMAN Palencia  Encounter Date: 2022   Encounter department: [address] [place] [address]  Type 2 diabetes mellitus with diabetic polyneuropathy, without long-term current use of insulin ([facility]  Assessment & Plan:  - Well controlled  - Unable to tolerate Metformin due to diarrhea  She was switched to [facility] by her previous provider    - Discussed healthy diet and regular exercise  - DM foot exam performed in offer today  She was referred to Podiatry given history of neuropathy    - Will continue to monitor  Lab Results   Component Value Date    HGBA1C 6 2 (H) 2022       Orders:  -     Ambulatory Referral to Podiatry; Future    2  Depression, unspecified depression type  Assessment & Plan:  - Previously on Cymbalta for anxiety/depression and neuropathy  Patient finding no benefit  - Provided instructions on how to wean off both medications  - Once off Cymbalta and Buspar, will start Elavil  Discussed this may help anxiety/depression, neuropathy, and headaches  - Recommend follow up in 1 month  Orders:  -     amitriptyline (ELAVIL) 25 mg tablet; Take 1 tablet (25 mg total) by mouth daily at bedtime    3  Neuropathy  Assessment & Plan:  - Not well controlled  - Will stop Cymbalta and start Elavil  - Referred to Podiatry  - Follow up in 1 month  Orders:  -     amitriptyline (ELAVIL) 25 mg tablet; Take 1 tablet (25 mg total) by mouth daily at bedtime  -     Ambulatory Referral to Podiatry; Future    4  Gastroesophageal reflux disease without esophagitis  Assessment & Plan:  - Stable with use of omeprazole  Continue same    - Avoid triggering food and beverage   - Advised symptoms may start to improve with weight loss  - Will continue to monitor  5  Essential hypertension  Assessment & Plan:  - Well controlled on Diovan daily   Continue same    - Will continue to monitor  6  Class 3 severe obesity due to excess calories with serious comorbidity and body mass index (BMI) of 50 0 to 59 9 in adult [facility])  Assessment & Plan:  - Was previously on Ozempic and lost 28 pounds  - Switched to [employer]  - Discussed healthy diet and regular exercise  - She is currently seeing weight loss doctor through [facility]  - Will continue to monitor  7  Dyslipidemia  Assessment & Plan:  - Total cholesterol 209, triglycerides 225, HDL well controlled at 59, and LDL stable at 105  - Discussed healthy diet and regular exercise  - Will continue to monitor fasting lipid panel  Subjective     Patient with PMH of IBS, GERD, type 2 diabetes, HTN, peripheral neuropathy, obesity, anxiety, and depression presents today to establish care  Is having concerns today regarding her neuropathy and depression  She was started on gabapentin for neuropathy which did provide some benefit but it caused her weight gain  She is currently seeing a weight management doctor through [facility] who took her off the gabapentin  She was started on [employer] for weight loss and also diabetes management  She was previously on Metformin but couldn't tolerate due to diarrhea  She was placed on Cymbalta and buspar to help her neuropathy and anxiety/depression  She reports the medications are not working  She continues to have numbness and tinging sensation in her feet  Her most recent labs show well controlled hemoglobin A1c of 6 2  Review of Systems   Constitutional: Negative for fatigue and fever  HENT: Negative for congestion, postnasal drip and trouble swallowing  Eyes: Negative for visual disturbance  Respiratory: Negative for cough and shortness of breath  Cardiovascular: Negative for chest pain and palpitations  Gastrointestinal: Negative for abdominal pain and blood in stool  Endocrine: Negative for cold intolerance and heat intolerance     Genitourinary: Negative for difficulty urinating and dysuria  Musculoskeletal: Negative for gait problem  Skin: Negative for rash  Neurological: Positive for numbness (numbness and tingling of bilateral feet  ) and headaches  Negative for dizziness and syncope  Hematological: Negative for adenopathy  Psychiatric/Behavioral: Positive for dysphoric mood  Negative for behavioral problems  The patient is nervous/anxious  Past Medical History:   Diagnosis Date   • Abnormal Pap smear of cervix    • Anxiety    • Diabetes ([address] )    • Eating disorder    • Gallstone    • Genital herpes    • History of gallstones 2018   • HPV (human papilloma virus) infection    • Hypertension    • Varicella     imm     Past Surgical History:   Procedure Laterality Date   •  SECTION     • COLPOSCOPY     • NH  DELIVERY ONLY N/A 2019    Procedure:  SECTION ();   Surgeon: Jazmin Samuels MD;  Location: [facility];  Service: Obstetrics     Family History   Problem Relation Age of Onset   • Hypertension Mother    • Hyperlipidemia Mother    • Asthma Mother    • Miscarriages / Stillbirths Mother    • Lung cancer Mother    • Liver cancer Mother    • Brain cancer Mother    • Pulmonary embolism Mother    • No Known Problems Father    • Drug abuse Brother    • Mental illness Brother    • No Known Problems Daughter    • Hypertension Maternal Grandmother    • Diabetes Maternal Grandmother         type 2   • Hearing loss Maternal Grandmother    • Early death Maternal Grandmother    • Stroke Maternal Grandmother    • Cervical cancer Maternal Grandmother    • Hyperlipidemia Maternal Aunt    • Early death Maternal Aunt    • Brain cancer Maternal Aunt    • Lung cancer Maternal Aunt      Social History     Socioeconomic History   • Marital status: Single     Spouse name: Josh Rodriguez   • Number of children: None   • Years of education: [employer]   • Highest education level: None   Occupational History   • Occupation: MA   Tobacco Use   • Smoking status: Never   • Smokeless tobacco: Never   Vaping Use   • Vaping Use: Never used   Substance and Sexual Activity   • Alcohol use: Not Currently   • Drug use: Never   • Sexual activity: Yes     Partners: Male     Birth control/protection: OCP   Other Topics Concern   • None   Social History Narrative   • None     Social Determinants of Health     Financial Resource Strain: Not on file   Food Insecurity: Not on file   Transportation Needs: Not on file   Physical Activity: Not on file   Stress: Not on file   Social Connections: Not on file   Intimate Partner Violence: Not on file   Housing Stability: Not on file     Current Outpatient Medications on File Prior to Visit   Medication Sig   • Biotin 5 MG TBDP    • busPIRone (BUSPAR) 30 MG tablet Take 30 mg by mouth 2 (two) times a day   • drospirenone-ethinyl estradiol (TATY) 3-0 02 MG per tablet Take 1 tablet by mouth daily   • DULoxetine (CYMBALTA) 60 mg delayed release capsule Take 60 mg by mouth daily   • omeprazole (PriLOSEC) 20 mg delayed release capsule TAKE 1 CAPSULE BY MOUTH EVERY DAY   • sucralfate (CARAFATE) 1 g/10 mL suspension Take 10 mL (1 g total) by mouth 4 (four) times a day (with meals and at bedtime)   • Tirzepatide (Mounjaro) 2 5 MG/0 5ML SOPN Inject 2 5 mg under the skin   • valsartan-hydrochlorothiazide (DIOVAN-HCT) 320-25 MG per tablet TAKE 1 TABLET BY MOUTH EVERY DAY   • [DISCONTINUED] Biotin 1 MG CAPS Take 5 mg by mouth daily   • [DISCONTINUED] busPIRone (BUSPAR) 15 mg tablet PLEASE SEE ATTACHED FOR DETAILED DIRECTIONS   • [DISCONTINUED] busPIRone (BUSPAR) 30 MG tablet Take 30 mg by mouth 2 (two) times a day   • [DISCONTINUED] clotrimazole (LOTRIMIN) 1 % cream APPLY TOPICALLY 2 TIMES A DAY FOR 21 DAYS     • [DISCONTINUED] Diclofenac Sodium  MG 24 hr tablet Take 100 mg by mouth daily   • [DISCONTINUED] dicyclomine (BENTYL) 10 mg capsule Take 1 capsule (10 mg total) by mouth 4 (four) times a day (before meals and at bedtime) • [DISCONTINUED] DULoxetine (CYMBALTA) 30 mg delayed release capsule Take 30 mg by mouth daily   • [DISCONTINUED] DULoxetine (CYMBALTA) 60 mg delayed release capsule Take 60 mg by mouth daily   • [DISCONTINUED] metFORMIN (GLUCOPHAGE-XR) 500 mg 24 hr tablet    • [DISCONTINUED] metFORMIN (GLUCOPHAGE-XR) 500 mg 24 hr tablet Start one tab PO after dinner  After 1 week, add tab after breakfast  After 1 week, add 2nd tab after dinner, after another week, add  2nd tab after breakfast  Goal is two 500 mg tablets 2x/day     • [DISCONTINUED] ondansetron (Zofran ODT) 4 mg disintegrating tablet Take 1 tablet (4 mg total) by mouth every 6 (six) hours as needed for nausea or vomiting   • [DISCONTINUED] PREBIOTIC PRODUCT PO Take by mouth daily   • [DISCONTINUED] Semaglutide,0 25 or 0 5MG/DOS, (Ozempic, 0 25 or 0 5 MG/DOSE,) 2 MG/1 5ML SOPN Inject 0 25 mg under the skin Once a week   • [DISCONTINUED] valACYclovir (VALTREX) 1,000 mg tablet Take 1 tablet (1,000 mg total) by mouth 2 (two) times a day for 7 days     Allergies   Allergen Reactions   • Erythromycin Shortness Of Breath   • Penicillins Hives and Other (See Comments)     Mom told pt she had reaction when she was an infant, but can have Amoxicillin     • Medical Tape Rash     Immunization History   Administered Date(s) Administered   • COVID-19 PFIZER VACCINE 0 3 ML IM 04/09/2021, 04/30/2021   • HPV9 09/10/2019, 12/03/2019, 07/24/2020   • INFLUENZA 11/01/2020   • Influenza Quadrivalent Preservative Free 3 years and older IM 10/22/2021   • Influenza, injectable, quadrivalent, preservative free 0 5 mL 11/14/2018   • Pneumococcal Polysaccharide PPV23 10/18/2021   • Rabies 09/15/2011, 09/18/2011, 09/22/2011, 09/29/2011   • Tdap 09/15/2011, 03/12/2019   • Tuberculin Skin Test-PPD Intradermal 04/22/2022       Objective     /86 (BP Location: Left arm, Patient Position: Sitting, Cuff Size: Large)   Pulse 102   Temp 97 9 °F (36 6 °C) (Tympanic)   Resp 16   Ht 5' 2" (1 572 m)   Wt (!) 139 kg (307 lb)   SpO2 99%   BMI 56 15 kg/m²     Physical Exam  Vitals and nursing note reviewed  Constitutional:       Appearance: Normal appearance  HENT:      Head: Normocephalic and atraumatic  Right Ear: External ear normal       Left Ear: External ear normal    Eyes:      Conjunctiva/sclera: Conjunctivae normal    Cardiovascular:      Rate and Rhythm: Normal rate and regular rhythm  Heart sounds: Normal heart sounds  Pulmonary:      Effort: Pulmonary effort is normal       Breath sounds: Normal breath sounds  Musculoskeletal:         General: Normal range of motion  Cervical back: Normal range of motion  Skin:     General: Skin is warm and dry  Neurological:      Mental Status: She is alert and oriented to person, place, and time  Cranial Nerves: No cranial nerve deficit     Psychiatric:         Mood and Affect: Mood normal          Behavior: Behavior normal        SHERMAN Amos

## 2022-12-15 ENCOUNTER — TELEPHONE (OUTPATIENT)
Dept: ADMINISTRATIVE | Facility: OTHER | Age: 32
End: 2022-12-15

## 2022-12-15 NOTE — TELEPHONE ENCOUNTER
----- Message from Kasey Gutiérrez sent at 12/14/2022  1:31 PM EST -----  Regarding: DM eye exam  12/14/22 1:31 PM    Hello, our patient Marianne Camp has had Diabetic Eye Exam completed/performed  Please assist in updating the patient chart by making an External outreach to [facility] facility located in [place]  The date of service is 2022      Thank you,  Kasey Gutiérrez  [facility] [address] [facility]

## 2022-12-15 NOTE — LETTER
Diabetic Eye Exam Form    Date Requested: 22  Patient: Rebecca Rey  Patient : 1990   Referring Provider: SHERMAN Nevarez      DIABETIC Eye Exam Date _______________________________      Type of Exam MUST be documented for Diabetic Eye Exams  Please CHECK ONE  Retinal Exam       Dilated Retinal Exam       OCT       Optomap-Iris Exam      Fundus Photography       Left Eye - Please check Retinopathy or No Retinopathy        Exam did show retinopathy    Exam did not show retinopathy       Right Eye - Please check Retinopathy or No Retinopathy       Exam did show retinopathy    Exam did not show retinopathy       Comments __________________________________________________________    Practice Providing Exam ______________________________________________    Exam Performed By (print name) _______________________________________      Provider Signature ___________________________________________________      These reports are needed for  compliance  Please fax this completed form and a copy of the Diabetic Eye Exam report to our office located at [place] [address] as soon as possible via [phone] leslie Espinosa: Phone [phone]  We thank you for your assistance in treating our mutual patient

## 2022-12-15 NOTE — LETTER
Diabetic Eye Exam Form  Second Request    Date Requested: 22  Patient: Oliva Schrader  Patient : 1990   Referring Provider: SHERMAN Hayward      DIABETIC Eye Exam Date _______________________________      Type of Exam MUST be documented for Diabetic Eye Exams  Please CHECK ONE  Retinal Exam       Dilated Retinal Exam       OCT       Optomap-Iris Exam      Fundus Photography       Left Eye - Please check Retinopathy or No Retinopathy        Exam did show retinopathy    Exam did not show retinopathy       Right Eye - Please check Retinopathy or No Retinopathy       Exam did show retinopathy    Exam did not show retinopathy       Comments __________________________________________________________    Practice Providing Exam ______________________________________________    Exam Performed By (print name) _______________________________________      Provider Signature ___________________________________________________      These reports are needed for  compliance  Please fax this completed form and a copy of the Diabetic Eye Exam report to our office located at [place] [address] as soon as possible via [phone] leslie Davis Coffee: Phone [phone]  We thank you for your assistance in treating our mutual patient

## 2022-12-16 NOTE — TELEPHONE ENCOUNTER
Upon review of the In Basket request and the patient's chart, initial outreach has been made via fax to facility  Please see Contacts section for details       Thank you  Socrates Olguin

## 2022-12-19 NOTE — TELEPHONE ENCOUNTER
As a follow-up, a second attempt has been made for outreach via fax to facility  Please see Contacts section for details      Thank you  Jerry Morrison

## 2022-12-23 NOTE — TELEPHONE ENCOUNTER
As a final attempt, a third outreach has been made via telephone call to facility  Please see Contacts section for details  This encounter will be closed and completed by end of day  Should we receive the requested information because of previous outreach attempts, the requested patient's chart will be updated appropriately       Thank you  Isidro Larose

## 2023-01-03 ENCOUNTER — OFFICE VISIT (OUTPATIENT)
Dept: PODIATRY | Facility: CLINIC | Age: 33
End: 2023-01-03

## 2023-01-03 ENCOUNTER — OFFICE VISIT (OUTPATIENT)
Dept: GASTROENTEROLOGY | Facility: MEDICAL CENTER | Age: 33
End: 2023-01-03

## 2023-01-03 VITALS
DIASTOLIC BLOOD PRESSURE: 76 MMHG | WEIGHT: 293 LBS | BODY MASS INDEX: 56.17 KG/M2 | HEART RATE: 76 BPM | TEMPERATURE: 97.8 F | SYSTOLIC BLOOD PRESSURE: 132 MMHG

## 2023-01-03 VITALS
HEIGHT: 62 IN | SYSTOLIC BLOOD PRESSURE: 130 MMHG | HEART RATE: 75 BPM | DIASTOLIC BLOOD PRESSURE: 78 MMHG | WEIGHT: 293 LBS | BODY MASS INDEX: 53.92 KG/M2

## 2023-01-03 DIAGNOSIS — K21.9 GASTROESOPHAGEAL REFLUX DISEASE WITHOUT ESOPHAGITIS: ICD-10-CM

## 2023-01-03 DIAGNOSIS — G62.9 NEUROPATHY: ICD-10-CM

## 2023-01-03 DIAGNOSIS — K58.2 IRRITABLE BOWEL SYNDROME WITH BOTH CONSTIPATION AND DIARRHEA: ICD-10-CM

## 2023-01-03 DIAGNOSIS — E11.42 TYPE 2 DIABETES MELLITUS WITH DIABETIC POLYNEUROPATHY, WITHOUT LONG-TERM CURRENT USE OF INSULIN (HCC): ICD-10-CM

## 2023-01-03 DIAGNOSIS — R19.7 DIARRHEA, UNSPECIFIED TYPE: Primary | ICD-10-CM

## 2023-01-03 DIAGNOSIS — K80.20 CALCULUS OF GALLBLADDER WITHOUT CHOLECYSTITIS WITHOUT OBSTRUCTION: ICD-10-CM

## 2023-01-03 DIAGNOSIS — R10.11 RUQ PAIN: ICD-10-CM

## 2023-01-03 RX ORDER — OMEPRAZOLE 40 MG/1
CAPSULE, DELAYED RELEASE ORAL
COMMUNITY
Start: 2022-12-30

## 2023-01-03 RX ORDER — AMOXICILLIN AND CLAVULANATE POTASSIUM 875; 125 MG/1; MG/1
TABLET, FILM COATED ORAL
COMMUNITY
Start: 2022-12-30

## 2023-01-03 RX ORDER — POLYETHYLENE GLYCOL 3350 17 G/17G
POWDER, FOR SOLUTION ORAL
COMMUNITY
Start: 2022-12-30

## 2023-01-03 NOTE — LETTER
January 4, 2023     SHERMAN Tolentino  [address] [facility] [address]    Patient: Zahra Wadsworth   YOB: 1990   Date of Visit: 1/3/2023       Dear Dr Arlet Allen:    Thank you for referring Zahra Wadsworth to me for evaluation  Below are my notes for this consultation  If you have questions, please do not hesitate to call me  I look forward to following your patient along with you  Sincerely,        Bia Randhawa DPM        CC: No Recipients  [facility]  1/3/2023  3:03 PM  Signed  Assessment/Plan:      Diagnoses and all orders for this visit:    Neuropathy  -     Ambulatory Referral to Podiatry    Type 2 diabetes mellitus with diabetic polyneuropathy, without long-term current use of insulin ([facility]  -     Ambulatory Referral to Podiatry     Diagnosis and options discussed with patient  Patient agreeable to the plan as stated below    Early neuropathy in her feet  MSK exam normal  Pulses normal      -Educated on DM risk to lower extremities, proper shoe gear, and daily monitoring of feet    -Educated on A1C and the risks of poorly controlled Diabetes  Reviewed recent A1C, 6 2  -Discussed weight loss and suitable exercise regiment  Reviewed most recent PCP visit on 12/14/2022  DM foot risk performed at that visit  Agree with findings  Patient needs to make drastic measures to lose weight  This is likely why her legs are so crampy and tired  Neuropathy mild  A1C well controlled  Subjective:     Patient ID: Zahra Wadsworth is a 28 y o  female  Patient presents for neuropathy in her feet  She gets muscle cramp, swelling in legs and stiffness  Her toes feel numb a lot  Her A1C is 6 2  Her nurse practitioner referred her here         The following portions of the patient's history were reviewed and updated as appropriate: allergies, current medications, past family history, past medical history, past social history, past surgical history and problem list     Review of Systems   Constitutional: Negative  HENT: Negative for sinus pressure and sinus pain  Respiratory: Negative for cough and shortness of breath  Cardiovascular: Negative for chest pain and leg swelling  Gastrointestinal: Negative for diarrhea, nausea and vomiting  Musculoskeletal: Negative for arthralgias, gait problem, joint swelling and myalgias  Skin: Negative for rash or wound  Neurological: Neuropathy  Psychiatric/Behavioral: The patient is not nervous/anxious  Objective:      /78   Pulse 75   Ht 5' 2" (1 575 m)   Wt (!) 139 kg (307 lb)   BMI 56 15 kg/m²     HEMOGLOBIN A1C  Order: [id number]   Status: Final result      Next appt: 01/11/2023 at 01:00 PM in Family Medicine ([address] [place] [address])     Component Ref Range & Units 11/30/22  9:45 AM 8/13/22  9:55 AM 8/13/22 12:00 AM 4/30/22  8:18 AM 8/11/21  4:51 PM 3/2/21  5:45 PM 8/27/20 12:18 PM   Hemoglobin A1C <5 7 % 6 2 High   6 3 High  CM  6 3 R  6 1 High  R  6 5 High  R, CM  5 9 R  6 1 High  R    Comment: Reference Range   Non-diabetic                     <5 7   Pre-diabetic                     5 7-6 4   Diabetic                         >=6 5   ADA target for diabetic control  <=7   eAG, EST AVG Glucose <154 mg/dL 131  134   128 R               Physical Exam  Vitals reviewed  Constitutional:       Appearance: She is morbidly obese  Cardiovascular:      Rate and Rhythm: Normal rate  Pulses: Normal pulses  Dorsalis pedis pulses are 2+ on the right side and 2+ on the left side  Posterior tibial pulses are 2+ on the right side and 2+ on the left side  Pulmonary:      Effort: Pulmonary effort is normal  No respiratory distress  Musculoskeletal:         General: No deformity  Right lower leg: Edema present  Left lower leg: Edema present  Right foot: Normal range of motion  No deformity  Left foot: Normal range of motion  No deformity     Feet:      Right foot: Protective Sensation: 10 sites tested  7 sites sensed  Skin integrity: Dry skin present  No ulcer, blister, callus or fissure  Toenail Condition: Right toenails are normal       Left foot:      Protective Sensation: 10 sites tested  7 sites sensed  Skin integrity: Dry skin present  No ulcer, blister, callus or fissure  Toenail Condition: Left toenails are normal    Skin:     General: Skin is dry  Capillary Refill: Capillary refill takes less than 2 seconds  Findings: No erythema or rash  Neurological:      Mental Status: She is alert and oriented to person, place, and time  Sensory: Sensory deficit present        Gait: Gait abnormal    Psychiatric:         Mood and Affect: Mood normal

## 2023-01-03 NOTE — PATIENT INSTRUCTIONS
Plantar Fasciitis Exercises   WHAT YOU NEED TO KNOW:   Plantar fasciitis exercises help stretch your plantar fascia, calf muscles, and Achilles tendon  They also help strengthen the muscles that support your heel and foot  Exercises and stretching can help prevent plantar fasciitis from getting worse or coming back  DISCHARGE INSTRUCTIONS:   Contact your healthcare provider if:   Your pain and swelling increase  You develop new knee, hip, or back pain  You have questions or concerns about your condition or care  How to do plantar fasciitis exercises:  Ask your healthcare provider when to start these exercises and how often to do them  Slant board stretch:  Stand on a slanted board with your toes higher than your heel  Press your heel into the board  Keep your knee slightly bent  Hold this position for 1 minute  Repeat 5 times  Heel stretch:  Stand up straight with your hands on a wall  Place your injured leg slightly behind your other leg  Keep your heels flat on the floor, lean forward, and bend both knees  Hold for 30 seconds  Calf stretch:  Stand up straight with your hands on a wall  Step forward so that your uninjured foot is in front of your injured foot  Keep your front leg bent and your back leg straight  Gently lean forward until you feel your calf stretch  Hold for 30 seconds and then relax  Seated plantar fascia stretch:  Sit on a firm surface, such as the floor or a mat  Extend your legs out in front of you  Raise your injured foot a few inches off the ground  Keep your leg straight  Grab the toes of your injured foot and pull them toward you  With your other hand, feel your plantar fascia  You should feel it press outward  Hold for 30 seconds  If you cannot reach your toes, loop a towel or tie around your foot  Gently pull on the towel or tie and flex your toes toward you  Heel raises:  Stand on the injured leg  Raise your other leg off the ground   Hold onto a railing or wall for balance  Slowly rise up on the toes of your injured leg  Hold for 5 seconds  Slowly lower your heel to the ground  Toe curls:  Place a towel on the floor  Put your foot flat on the towel  Grab the towel with your toes by curling them around the towel  Lift the towel up with your toes  Toe taps:  Sit down and place your foot flat on the floor  Keep your heel on the floor  Point all your toes up toward the ceiling  While the 4 smaller toes are pointed up, bend your big toe down and tap it on the ground  Do 10 to 50 taps  Point all 5 toes up toward the ceiling again  This time keep your big toe pointed up and tap the 4 smaller toes on the ground  Do 10 to 50 taps each time  Follow up with your doctor as directed:  Write down your questions so you remember to ask them during your visits  © Copyright [employer] 2022 Information is for End User's use only and may not be sold, redistributed or otherwise used for commercial purposes  All illustrations and images included in CareNotes® are the copyrighted property of A [employer] A M [employer]  or Neville Rader   The above information is an  only  It is not intended as medical advice for individual conditions or treatments  Talk to your doctor, nurse or pharmacist before following any medical regimen to see if it is safe and effective for you

## 2023-01-03 NOTE — PROGRESS NOTES
Assessment/Plan:       Diagnoses and all orders for this visit:    Neuropathy  -     Ambulatory Referral to Podiatry    Type 2 diabetes mellitus with diabetic polyneuropathy, without long-term current use of insulin ([facility] [address] )  -     Ambulatory Referral to Podiatry      Diagnosis and options discussed with patient  Patient agreeable to the plan as stated below    Early neuropathy in her feet  MSK exam normal  Pulses normal      -Educated on DM risk to lower extremities, proper shoe gear, and daily monitoring of feet    -Educated on A1C and the risks of poorly controlled Diabetes  Reviewed recent A1C, 6 2  -Discussed weight loss and suitable exercise regiment  Reviewed most recent PCP visit on 12/14/2022  DM foot risk performed at that visit  Agree with findings  Patient needs to make drastic measures to lose weight  This is likely why her legs are so crampy and tired  Neuropathy mild  A1C well controlled  Subjective:      Patient ID: Alexi Nguyen is a 28 y o  female  Patient presents for neuropathy in her feet  She gets muscle cramp, swelling in legs and stiffness  Her toes feel numb a lot  Her A1C is 6 2  Her nurse practitioner referred her here  The following portions of the patient's history were reviewed and updated as appropriate: allergies, current medications, past family history, past medical history, past social history, past surgical history and problem list     Review of Systems    Constitutional: Negative  HENT: Negative for sinus pressure and sinus pain  Respiratory: Negative for cough and shortness of breath  Cardiovascular: Negative for chest pain and leg swelling  Gastrointestinal: Negative for diarrhea, nausea and vomiting  Musculoskeletal: Negative for arthralgias, gait problem, joint swelling and myalgias  Skin: Negative for rash or wound  Neurological: Neuropathy  Psychiatric/Behavioral: The patient is not nervous/anxious        Objective:      /78   Pulse 75   Ht 5' 2" (1 575 m)   Wt (!) 139 kg (307 lb)   BMI 56 15 kg/m²     HEMOGLOBIN A1C  Order: [id number]   Status: Final result      Next appt: 01/11/2023 at 01:00 PM in Family Medicine ([address] [place] [address], [facility])     Component Ref Range & Units 11/30/22  9:45 AM 8/13/22  9:55 AM 8/13/22 12:00 AM 4/30/22  8:18 AM 8/11/21  4:51 PM 3/2/21  5:45 PM 8/27/20 12:18 PM   Hemoglobin A1C <5 7 % 6 2 High   6 3 High  CM  6 3 R  6 1 High  R  6 5 High  R, CM  5 9 R  6 1 High  R    Comment: Reference Range   Non-diabetic                     <5 7   Pre-diabetic                     5 7-6 4   Diabetic                         >=6 5   ADA target for diabetic control  <=7   eAG, EST AVG Glucose <154 mg/dL 131  134   128 R                Physical Exam  Vitals reviewed  Constitutional:       Appearance: She is morbidly obese  Cardiovascular:      Rate and Rhythm: Normal rate  Pulses: Normal pulses  Dorsalis pedis pulses are 2+ on the right side and 2+ on the left side  Posterior tibial pulses are 2+ on the right side and 2+ on the left side  Pulmonary:      Effort: Pulmonary effort is normal  No respiratory distress  Musculoskeletal:         General: No deformity  Right lower leg: Edema present  Left lower leg: Edema present  Right foot: Normal range of motion  No deformity  Left foot: Normal range of motion  No deformity  Feet:      Right foot:      Protective Sensation: 10 sites tested  7 sites sensed  Skin integrity: Dry skin present  No ulcer, blister, callus or fissure  Toenail Condition: Right toenails are normal       Left foot:      Protective Sensation: 10 sites tested  7 sites sensed  Skin integrity: Dry skin present  No ulcer, blister, callus or fissure  Toenail Condition: Left toenails are normal    Skin:     General: Skin is dry  Capillary Refill: Capillary refill takes less than 2 seconds  Findings: No erythema or rash     Neurological: Mental Status: She is alert and oriented to person, place, and time  Sensory: Sensory deficit present        Gait: Gait abnormal    Psychiatric:         Mood and Affect: Mood normal

## 2023-01-03 NOTE — PROGRESS NOTES
[facility] - Outpatient Consultation  Bashir Patel 28 y o  female MRN: [id number]  Encounter: [id number]          ASSESSMENT AND PLAN:      1  Gastroesophageal reflux disease without esophagitis: she is taking prilosec 40mg once daily and this works well for her  - Ambulatory Referral to Gastroenterology  -continue prilosec 40mg daily    2  Calculus of gallbladder without cholecystitis without obstruction  3  RUQ pain: recent hospitalization at [facility] for acute cholecystitis and possible cystic duct obstruction  Due to fear of anesthesia has refused lap flavio at this point  Follows up with surgery later today   - Ambulatory Referral to Gastroenterology    3  Irritable bowel syndrome with both constipation and diarrhea:   4  Diarrhea, unspecified type: admits to about a 1 year hx of 3-4 episodes of watery diarrhea on daily basis  This usually occurs immediately after meals  Celiac panel 6/22 negative  Recent abdominal xray showings stool throughout the colon  Consider overflow diarrhea-she is not taking miralax daily  Will check labs and stool tests to rule out thyroid abnormality, infectious cause  If normal, consider egd and colonoscopy for random biopsies   - Clostridium difficile toxin by PCR with EIA  - Giardia antigen  - Ova and parasite examination; Future  - Pancreatic elastase, fecal  - Stool Enteric Bacterial Panel by PCR  - Calprotectin,Fecal  - TSH + Free T4; Future  - Pancreatic elastase, fecal    ______________________________________________________________________    HPI:  Bashir Patel is a 29 yo female with a past medical history of obesity, diabetes mellitus, pretension, GERD who is here as a new patient for diarrhea  She states that she has had 3-4 episodes of watery diarrhea that mainly occurs immediately after meals for the past 1 year  She did have a celiac panel in June 2022 that was negative    She has not had any other blood work or stool testing completed  She did recently have hospitalization for acute cholecystitis and possible cystic duct obstruction  She was recommended to have cholecystectomy at that time but due to fear of anesthesia she is refused  She follows up with surgery [facility] later today  No EGD or colonoscopy in the past      REVIEW OF SYSTEMS:    CONSTITUTIONAL: Denies any fever, chills, rigors, and weight loss  HEENT: No earache or tinnitus  Denies hearing loss or visual disturbances  CARDIOVASCULAR: No chest pain or palpitations  RESPIRATORY: Denies any cough, hemoptysis, shortness of breath or dyspnea on exertion  GASTROINTESTINAL: As noted in the History of Present Illness  GENITOURINARY: No problems with urination  Denies any hematuria or dysuria  NEUROLOGIC: No dizziness or vertigo, denies headaches  MUSCULOSKELETAL: Denies any muscle or joint pain  SKIN: Denies skin rashes or itching  ENDOCRINE: Denies excessive thirst  Denies intolerance to heat or cold  PSYCHOSOCIAL: Denies depression or anxiety  Denies any recent memory loss  Historical Information   Past Medical History:   Diagnosis Date   • Abnormal Pap smear of cervix    • Anxiety    • Diabetes ([address] )    • Eating disorder    • Gallstone    • Genital herpes    • History of gallstones 2018   • HPV (human papilloma virus) infection    • Hypertension    • Varicella     imm     Past Surgical History:   Procedure Laterality Date   •  SECTION     • COLPOSCOPY     • ID  DELIVERY ONLY N/A 2019    Procedure:  SECTION ();   Surgeon: Adarsh Torres MD;  [facility];  Service: Obstetrics     Social History   Social History     Substance and Sexual Activity   Alcohol Use Not Currently     Social History     Substance and Sexual Activity   Drug Use Never     Social History     Tobacco Use   Smoking Status Never   Smokeless Tobacco Never     Family History   Problem Relation Age of Onset   • Hypertension Mother    • Hyperlipidemia Mother    • Asthma Mother    • Miscarriages / Stillbirths Mother    • Lung cancer Mother    • Liver cancer Mother    • Brain cancer Mother    • Pulmonary embolism Mother    • No Known Problems Father    • Drug abuse Brother    • Mental illness Brother    • No Known Problems Daughter    • Hypertension Maternal Grandmother    • Diabetes Maternal Grandmother         type 2   • Hearing loss Maternal Grandmother    • Early death Maternal Grandmother    • Stroke Maternal Grandmother    • Cervical cancer Maternal Grandmother    • Hyperlipidemia Maternal Aunt    • Early death Maternal Aunt    • Brain cancer Maternal Aunt    • Lung cancer Maternal Aunt        Meds/Allergies       Current Outpatient Medications:   •  amitriptyline (ELAVIL) 25 mg tablet  •  amoxicillin-clavulanate (AUGMENTIN) 875-125 mg per tablet  •  Biotin 5 MG TBDP  •  drospirenone-ethinyl estradiol (TATY) 3-0 02 MG per tablet  •  omeprazole (PriLOSEC) 20 mg delayed release capsule  •  omeprazole (PriLOSEC) 40 MG capsule  •  polyethylene glycol (GLYCOLAX) 17 GM/SCOOP powder  •  sucralfate (CARAFATE) 1 g/10 mL suspension  •  valsartan-hydrochlorothiazide (DIOVAN-HCT) 320-25 MG per tablet  •  busPIRone (BUSPAR) 30 MG tablet  •  DULoxetine (CYMBALTA) 60 mg delayed release capsule  •  Tirzepatide (Mounjaro) 2 5 MG/0 5ML SOPN    Allergies   Allergen Reactions   • Erythromycin Shortness Of Breath   • Penicillins Hives and Other (See Comments)     Mom told pt she had reaction when she was an infant, but can have Amoxicillin     • Medical Tape Rash           Objective     Blood pressure 132/76, pulse 76, temperature 97 8 °F (36 6 °C), weight (!) 139 kg (307 lb 1 6 oz), not currently breastfeeding  Body mass index is 56 17 kg/m²          PHYSICAL EXAM:      General Appearance:   Alert, cooperative, no distress, obese   HEENT:   Normocephalic, atraumatic, anicteric      Neck:  Supple, symmetrical, trachea midline   Lungs:   Clear to auscultation bilaterally; no rales, rhonchi or wheezing; respirations unlabored    Heart[de-identified]   Regular rate and rhythm; no murmur, rub, or gallop  Abdomen:   Soft, non-tender, non-distended; normal bowel sounds; no masses, no organomegaly    Genitalia:   Deferred    Rectal:   Deferred    Extremities:  No cyanosis, clubbing or edema        Skin:  No jaundice, rashes, or lesions          Lab Results:   No visits with results within 1 Day(s) from this visit  Latest known visit with results is:   Annual Exam on 12/05/2022   Component Date Value   • Case Report 12/05/2022                      Value:Gynecologic Cytology Report                       Case: [id number]                                  Authorizing Provider:  Tam Ty PA-C        Collected:           12/05/2022 1317              Ordering Location:     [facility]  Received:            12/05/2022 1317                                     Health                                                                       First Screen:          Flako Citrin, CT                                                       Specimen:    LIQUID-BASED PAP, SCREENING, Cervix, Endocervical                                         • Primary Interpretation 12/05/2022 Negative for intraepithelial lesion or malignancy    • Specimen Adequacy 12/05/2022 Satisfactory for evaluation  Endocervical/transformation zone component present  • Additional Information 12/05/2022                      Value: This result contains rich text formatting which cannot be displayed here     •  COLOR,UA 12/05/2022 Yellow    • CLARITY,UA 12/05/2022 Clear    • SPECIFIC GRAVITY,UA 12/05/2022 1 030    •  PH,UA 12/05/2022 6 0    • LEUKOCYTE ESTERASE,UA 12/05/2022 negative    • NITRITE,UA 12/05/2022 negative    • GLUCOSE, UA 12/05/2022 negative    • KETONES,UA 12/05/2022 negative    • BILIRUBIN,UA 12/05/2022 negative    • BLOOD,UA 12/05/2022 10    • POCT URINE PROTEIN 12/05/2022 15    • SL AMB POCT UROBILINOGEN 12/05/2022 0 2    • Urine Culture 12/05/2022 50,000-59,000 cfu/ml Lactobacillus species (A)    • Urine Culture 12/05/2022 <10,000 cfu/ml    • HPV Other HR 12/05/2022 Negative    • HPV16 12/05/2022 Negative    • HPV18 12/05/2022 Negative          Radiology Results:   No results found

## 2023-01-06 DIAGNOSIS — G62.9 NEUROPATHY: ICD-10-CM

## 2023-01-06 DIAGNOSIS — F32.A DEPRESSION, UNSPECIFIED DEPRESSION TYPE: ICD-10-CM

## 2023-01-06 RX ORDER — AMITRIPTYLINE HYDROCHLORIDE 25 MG/1
TABLET, FILM COATED ORAL
Qty: 90 TABLET | Refills: 1 | Status: SHIPPED | OUTPATIENT
Start: 2023-01-06

## 2023-01-06 NOTE — TELEPHONE ENCOUNTER
Pharmacy requesting 90 day supply on amitriptyline   Last seen 12/14/22  Medication sent to pharmacy

## 2023-01-19 ENCOUNTER — TELEPHONE (OUTPATIENT)
Dept: OTHER | Facility: OTHER | Age: 33
End: 2023-01-19

## 2023-01-19 NOTE — TELEPHONE ENCOUNTER
Received call from [facility] lab regarding stool testing that was not completed  They would need an order for the testing; patient did drop off an additional sample  They are asking for a call back from the office for order for stool testing  See patient message from 1/17 for more information

## 2023-01-23 ENCOUNTER — TELEPHONE (OUTPATIENT)
Dept: GASTROENTEROLOGY | Facility: MEDICAL CENTER | Age: 33
End: 2023-01-23

## 2023-01-23 NOTE — TELEPHONE ENCOUNTER
----- Message from eBssy Andre PA-C sent at 1/19/2023 12:28 PM EST -----  Please schedule patient for egd and colonoscopy, golytely prep   thanks

## 2023-01-29 DIAGNOSIS — K21.9 GASTROESOPHAGEAL REFLUX DISEASE WITHOUT ESOPHAGITIS: ICD-10-CM

## 2023-01-30 RX ORDER — SUCRALFATE ORAL 1 G/10ML
1 SUSPENSION ORAL
Qty: 420 ML | Refills: 0 | Status: SHIPPED | OUTPATIENT
Start: 2023-01-30

## 2023-01-31 ENCOUNTER — OFFICE VISIT (OUTPATIENT)
Dept: FAMILY MEDICINE CLINIC | Facility: CLINIC | Age: 33
End: 2023-01-31

## 2023-01-31 VITALS
BODY MASS INDEX: 53.92 KG/M2 | HEIGHT: 62 IN | HEART RATE: 89 BPM | DIASTOLIC BLOOD PRESSURE: 84 MMHG | OXYGEN SATURATION: 96 % | TEMPERATURE: 97.5 F | SYSTOLIC BLOOD PRESSURE: 128 MMHG | RESPIRATION RATE: 16 BRPM | WEIGHT: 293 LBS

## 2023-01-31 DIAGNOSIS — F32.A DEPRESSION, UNSPECIFIED DEPRESSION TYPE: ICD-10-CM

## 2023-01-31 DIAGNOSIS — K21.9 GASTROESOPHAGEAL REFLUX DISEASE WITHOUT ESOPHAGITIS: ICD-10-CM

## 2023-01-31 DIAGNOSIS — I10 ESSENTIAL HYPERTENSION: ICD-10-CM

## 2023-01-31 DIAGNOSIS — G62.9 NEUROPATHY: ICD-10-CM

## 2023-01-31 DIAGNOSIS — E11.42 TYPE 2 DIABETES MELLITUS WITH DIABETIC POLYNEUROPATHY, WITHOUT LONG-TERM CURRENT USE OF INSULIN (HCC): Primary | ICD-10-CM

## 2023-01-31 DIAGNOSIS — K58.2 IRRITABLE BOWEL SYNDROME WITH BOTH CONSTIPATION AND DIARRHEA: ICD-10-CM

## 2023-01-31 DIAGNOSIS — D72.829 LEUKOCYTOSIS, UNSPECIFIED TYPE: ICD-10-CM

## 2023-01-31 DIAGNOSIS — F41.9 ANXIETY: ICD-10-CM

## 2023-01-31 DIAGNOSIS — E66.01 CLASS 3 SEVERE OBESITY DUE TO EXCESS CALORIES WITH SERIOUS COMORBIDITY AND BODY MASS INDEX (BMI) OF 50.0 TO 59.9 IN ADULT (HCC): ICD-10-CM

## 2023-01-31 LAB
CREAT UR-MCNC: 103 MG/DL
MICROALBUMIN UR-MCNC: 5.4 MG/L (ref 0–20)
MICROALBUMIN/CREAT 24H UR: 5 MG/G CREATININE (ref 0–30)

## 2023-01-31 RX ORDER — AMITRIPTYLINE HYDROCHLORIDE 50 MG/1
50 TABLET, FILM COATED ORAL
Qty: 30 TABLET | Refills: 1 | Status: SHIPPED | OUTPATIENT
Start: 2023-01-31 | End: 2023-03-02

## 2023-01-31 RX ORDER — BUPROPION HYDROCHLORIDE 150 MG/1
150 TABLET ORAL DAILY
Qty: 30 TABLET | Refills: 1 | Status: SHIPPED | OUTPATIENT
Start: 2023-01-31

## 2023-01-31 NOTE — PROGRESS NOTES
Name: Flakita Solis      : 1990      MRN: [id number]  Encounter Provider: SHERMAN Valdivia  Encounter Date: 2023   Encounter department: [address] [place] [address]  Type 2 diabetes mellitus with diabetic polyneuropathy, without long-term current use of insulin ([facility] )  Assessment & Plan:  - Currently well controlled  - Was on Mounjaro prescribed by her weight loss doctor  Ended up having episode of cholecystitis  Mounjaro d/c'd  - Could not tolerate Metformin due to diarrhea  - Would like to try lifestyle changes  - referred to Diabetic Education and Weight Management  - Will continue to follow up  Lab Results   Component Value Date    HGBA1C 6 2 (H) 2022       Orders:  -     Hemoglobin A1C; Future; Expected date: 2023  -     Comprehensive metabolic panel; Future; Expected date: 2023  -     Microalbumin / creatinine urine ratio; Future; Expected date: 2023  -     Ambulatory referral to Diabetic Education - use to refer for diabetes group classes, individual diabetes education, medical nutrition therapy, device training; Future; Expected date: 2023  -     Microalbumin / creatinine urine ratio    2  Essential hypertension  Assessment & Plan:  - Well controlled on Diovan daily  Continue same    - Will continue to monitor  Orders:  -     Comprehensive metabolic panel; Future; Expected date: 2023  -     CBC and Platelet; Future; Expected date: 2023  -     Lipid Panel with Direct LDL reflex; Future; Expected date: 2023  -     TSH, 3rd generation with Free T4 reflex; Future; Expected date: 2023    3  Class 3 severe obesity due to excess calories with serious comorbidity and body mass index (BMI) of 50 0 to 59 9 in adult [facility])  Assessment & Plan:  - Referred to Weight Management for further evaluation       Orders:  -     Hemoglobin A1C; Future; Expected date: 2023  -     Comprehensive metabolic panel; Future; Expected date: 01/31/2023  -     CBC and Platelet; Future; Expected date: 01/31/2023  -     Lipid Panel with Direct LDL reflex; Future; Expected date: 01/31/2023  -     TSH, 3rd generation with Free T4 reflex; Future; Expected date: 01/31/2023  -     Ambulatory Referral to Weight Management; Future    4  Leukocytosis, unspecified type  Assessment & Plan:  - Longstanding history of leukocytosis  Reportedly followed with Hematology in the past who recommended bone marrow biopsy - she never got it done  - Will check updated CBC  5  Neuropathy  Assessment & Plan:  - Not well controlled  - Had no benefit from [address]  - Will increase Elavil to 50 mg    - Discussed importance of weight loss to help neuropathy  She was referred to Weight Management  - Will continue to monitor  Orders:  -     amitriptyline (ELAVIL) 50 mg tablet; Take 1 tablet (50 mg total) by mouth daily at bedtime    6  Depression, unspecified depression type  Assessment & Plan:  - Not well controlled  - Prescription sent for Wellbutrin  Advised of side effects   - Recommend follow up in 1 month  Orders:  -     buPROPion (Wellbutrin XL) 150 mg 24 hr tablet; Take 1 tablet (150 mg total) by mouth daily    7  Gastroesophageal reflux disease without esophagitis  Assessment & Plan:  - Stable  - Continue omeprazole daily  - Avoid triggering food and beverage   - Continue routine follow up with GI       8  Irritable bowel syndrome with both constipation and diarrhea  Assessment & Plan:  - Seen by GI who ordered labs  Fecal calprotectin elevated  Has history of GERD  Recommend EGD/Colonosocpy to rule out inflammatory bowel disease    - Will continue to follow up        9  Anxiety  -     amitriptyline (ELAVIL) 50 mg tablet; Take 1 tablet (50 mg total) by mouth daily at bedtime           Subjective     Patient presents today for follow up  She was recently hospitalized on 12/28 with RUQ abdominal pain and nausea   HIDA scan demonstrated acute cholecystitis  Thought to be caused by her Mounjaro injection that was prescribed by her weight loss doctor  She had been on Ozempic previously with no issue  She was given antibiotics in the hospital  She was seen by General Surgery who recommended cholecystectomy  Patient declined as she is nervous about going under anesthesia due to her weight and increased risk for complications  She follows up with them outpatient on 2/16  She is currently trying to avoid fatty and fried foods  She is also seeing GI regarding diarrhea  Her fecal calprotectin was elevated  They recommended EGD and colonoscopy  Is unsure if she wants to go through with this again because of fear of anesthesia  She is still having anxiety and depression symptoms and neuropathy  Does not notice a difference with Elavil  Denies any other concerns or complaints today  Review of Systems   Constitutional: Negative for fatigue and fever  HENT: Negative for trouble swallowing  Eyes: Negative for visual disturbance  Respiratory: Negative for cough and shortness of breath  Cardiovascular: Negative for chest pain and palpitations  Gastrointestinal: Negative for abdominal pain and blood in stool  Endocrine: Negative for cold intolerance and heat intolerance  Genitourinary: Negative for difficulty urinating and dysuria  Musculoskeletal: Negative for gait problem  Skin: Negative for rash  Neurological: Positive for numbness (neuropathy in b/l lower extremities)  Negative for dizziness, syncope and headaches  Hematological: Negative for adenopathy  Psychiatric/Behavioral: Positive for dysphoric mood  Negative for behavioral problems  The patient is nervous/anxious          Past Medical History:   Diagnosis Date   • Abnormal Pap smear of cervix    • Anxiety    • Diabetes ([address] )    • Eating disorder    • Gallstone    • Genital herpes    • History of gallstones 12/12/2018   • HPV (human papilloma virus) infection • Hypertension    • Varicella     imm     Past Surgical History:   Procedure Laterality Date   •  SECTION     • COLPOSCOPY     • [place]  DELIVERY ONLY N/A 2019    Procedure:  SECTION ();   Surgeon: Jeremy Olivarez MD;  Location: [facility];  Service: Obstetrics     Family History   Problem Relation Age of Onset   • Hypertension Mother    • Hyperlipidemia Mother    • Asthma Mother    • Miscarriages / Stillbirths Mother    • Lung cancer Mother    • Liver cancer Mother    • Brain cancer Mother    • Pulmonary embolism Mother    • No Known Problems Father    • Drug abuse Brother    • Mental illness Brother    • No Known Problems Daughter    • Hypertension Maternal Grandmother    • Diabetes Maternal Grandmother         type 2   • Hearing loss Maternal Grandmother    • Early death Maternal Grandmother    • Stroke Maternal Grandmother    • Cervical cancer Maternal Grandmother    • Hyperlipidemia Maternal Aunt    • Early death Maternal Aunt    • Brain cancer Maternal Aunt    • Lung cancer Maternal Aunt      Social History     Socioeconomic History   • Marital status: Single     Spouse name: Mega Hall   • Number of children: None   • Years of education: [employer]   • Highest education level: None   Occupational History   • Occupation: MA   Tobacco Use   • Smoking status: Never   • Smokeless tobacco: Never   Vaping Use   • Vaping Use: Never used   Substance and Sexual Activity   • Alcohol use: Not Currently   • Drug use: Never   • Sexual activity: Yes     Partners: Male     Birth control/protection: OCP   Other Topics Concern   • None   Social History Narrative   • None     Social Determinants of Health     Financial Resource Strain: Not on file   Food Insecurity: Not on file   Transportation Needs: Not on file   Physical Activity: Not on file   Stress: Not on file   Social Connections: Not on file   Intimate Partner Violence: Not on file   Housing Stability: Not on file     Current Outpatient Medications on File Prior to Visit   Medication Sig   • Biotin 5 MG TBDP    • drospirenone-ethinyl estradiol (TATY) 3-0 02 MG per tablet Take 1 tablet by mouth daily   • omeprazole (PriLOSEC) 40 MG capsule    • sucralfate (CARAFATE) 1 g/10 mL suspension Take 10 mL (1 g total) by mouth 4 (four) times a day (with meals and at bedtime)   • valsartan-hydrochlorothiazide (DIOVAN-HCT) 320-25 MG per tablet TAKE 1 TABLET BY MOUTH EVERY DAY   • valsartan-hydrochlorothiazide (DIOVAN-HCT) 320-25 MG per tablet TAKE 1 TABLET BY MOUTH EVERY DAY     Allergies   Allergen Reactions   • Erythromycin Shortness Of Breath   • Penicillins Hives and Other (See Comments)     Mom told pt she had reaction when she was an infant, but can have Amoxicillin     • Medical Tape Rash     Immunization History   Administered Date(s) Administered   • COVID-19 PFIZER VACCINE 0 3 ML IM 04/09/2021, 04/30/2021   • HPV9 09/10/2019, 12/03/2019, 07/24/2020   • INFLUENZA 11/01/2020   • Influenza Quadrivalent Preservative Free 3 years and older IM 10/22/2021   • Influenza, injectable, quadrivalent, preservative free 0 5 mL 11/14/2018   • Pneumococcal Polysaccharide PPV23 10/18/2021   • Rabies 09/15/2011, 09/18/2011, 09/22/2011, 09/29/2011   • Tdap 09/15/2011, 03/12/2019   • Tuberculin Skin Test-PPD Intradermal 04/22/2022       Objective     /84 (BP Location: Left arm, Patient Position: Sitting, Cuff Size: Large)   Pulse 89   Temp 97 5 °F (36 4 °C) (Tympanic)   Resp 16   Ht 5' 2" (1 575 m)   Wt (!) 141 kg (310 lb)   SpO2 96%   BMI 56 70 kg/m²     Physical Exam  Vitals and nursing note reviewed  Constitutional:       Appearance: Normal appearance  She is obese  HENT:      Head: Normocephalic and atraumatic  Right Ear: External ear normal       Left Ear: External ear normal    Eyes:      Conjunctiva/sclera: Conjunctivae normal    Cardiovascular:      Rate and Rhythm: Normal rate and regular rhythm        Heart sounds: Normal heart sounds  Pulmonary:      Effort: Pulmonary effort is normal       Breath sounds: Normal breath sounds  Musculoskeletal:         General: Normal range of motion  Cervical back: Normal range of motion  Skin:     General: Skin is warm and dry  Neurological:      Mental Status: She is alert and oriented to person, place, and time  Cranial Nerves: No cranial nerve deficit     Psychiatric:         Mood and Affect: Mood normal          Behavior: Behavior normal        SHERMAN Resendez

## 2023-02-01 NOTE — ASSESSMENT & PLAN NOTE
- Longstanding history of leukocytosis  Reportedly followed with Hematology in the past who recommended bone marrow biopsy - she never got it done  - Will check updated CBC

## 2023-02-01 NOTE — ASSESSMENT & PLAN NOTE
- Stable  - Continue omeprazole daily    - Avoid triggering food and beverage   - Continue routine follow up with GI

## 2023-02-01 NOTE — ASSESSMENT & PLAN NOTE
- Seen by GI who ordered labs  Fecal calprotectin elevated  Has history of GERD  Recommend EGD/Colonosocpy to rule out inflammatory bowel disease    - Will continue to follow up

## 2023-02-01 NOTE — ASSESSMENT & PLAN NOTE
- Not well controlled  - Had no benefit from [address]  - Will increase Elavil to 50 mg    - Discussed importance of weight loss to help neuropathy  She was referred to Weight Management  - Will continue to monitor

## 2023-02-01 NOTE — ASSESSMENT & PLAN NOTE
- Not well controlled  - Prescription sent for Wellbutrin  Advised of side effects   - Recommend follow up in 1 month

## 2023-02-01 NOTE — ASSESSMENT & PLAN NOTE
- Currently well controlled  - Was on Mounjaro prescribed by her weight loss doctor  Ended up having episode of cholecystitis  Mounjaro d/c'd  - Could not tolerate Metformin due to diarrhea  - Would like to try lifestyle changes  - referred to Diabetic Education and Weight Management  - Will continue to follow up     Lab Results   Component Value Date    HGBA1C 6 2 (H) 11/30/2022

## 2023-02-04 ENCOUNTER — LAB (OUTPATIENT)
Dept: LAB | Facility: MEDICAL CENTER | Age: 33
End: 2023-02-04

## 2023-02-04 DIAGNOSIS — E11.42 TYPE 2 DIABETES MELLITUS WITH DIABETIC POLYNEUROPATHY, WITHOUT LONG-TERM CURRENT USE OF INSULIN (HCC): ICD-10-CM

## 2023-02-04 DIAGNOSIS — I10 ESSENTIAL HYPERTENSION: ICD-10-CM

## 2023-02-04 DIAGNOSIS — E66.01 CLASS 3 SEVERE OBESITY DUE TO EXCESS CALORIES WITH SERIOUS COMORBIDITY AND BODY MASS INDEX (BMI) OF 50.0 TO 59.9 IN ADULT (HCC): ICD-10-CM

## 2023-02-04 LAB
ALBUMIN SERPL BCP-MCNC: 2.9 G/DL (ref 3.5–5)
ALP SERPL-CCNC: 78 U/L (ref 46–116)
ALT SERPL W P-5'-P-CCNC: 26 U/L (ref 12–78)
ANION GAP SERPL CALCULATED.3IONS-SCNC: 7 MMOL/L (ref 4–13)
AST SERPL W P-5'-P-CCNC: 12 U/L (ref 5–45)
BILIRUB SERPL-MCNC: 0.23 MG/DL (ref 0.2–1)
BUN SERPL-MCNC: 14 MG/DL (ref 5–25)
CALCIUM ALBUM COR SERPL-MCNC: 10 MG/DL (ref 8.3–10.1)
CALCIUM SERPL-MCNC: 9.1 MG/DL (ref 8.3–10.1)
CHLORIDE SERPL-SCNC: 104 MMOL/L (ref 96–108)
CHOLEST SERPL-MCNC: 190 MG/DL
CO2 SERPL-SCNC: 27 MMOL/L (ref 21–32)
CREAT SERPL-MCNC: 0.75 MG/DL (ref 0.6–1.3)
ERYTHROCYTE [DISTWIDTH] IN BLOOD BY AUTOMATED COUNT: 16.3 % (ref 11.6–15.1)
EST. AVERAGE GLUCOSE BLD GHB EST-MCNC: 128 MG/DL
GFR SERPL CREATININE-BSD FRML MDRD: 105 ML/MIN/1.73SQ M
GLUCOSE P FAST SERPL-MCNC: 111 MG/DL (ref 65–99)
HBA1C MFR BLD: 6.1 %
HCT VFR BLD AUTO: 44.4 % (ref 34.8–46.1)
HDLC SERPL-MCNC: 60 MG/DL
HGB BLD-MCNC: 13.4 G/DL (ref 11.5–15.4)
LDLC SERPL CALC-MCNC: 91 MG/DL (ref 0–100)
MCH RBC QN AUTO: 25.9 PG (ref 26.8–34.3)
MCHC RBC AUTO-ENTMCNC: 30.2 G/DL (ref 31.4–37.4)
MCV RBC AUTO: 86 FL (ref 82–98)
PLATELET # BLD AUTO: 454 THOUSANDS/UL (ref 149–390)
PMV BLD AUTO: 9.8 FL (ref 8.9–12.7)
POTASSIUM SERPL-SCNC: 3.9 MMOL/L (ref 3.5–5.3)
PROT SERPL-MCNC: 7.2 G/DL (ref 6.4–8.4)
RBC # BLD AUTO: 5.17 MILLION/UL (ref 3.81–5.12)
SODIUM SERPL-SCNC: 138 MMOL/L (ref 135–147)
TRIGL SERPL-MCNC: 195 MG/DL
TSH SERPL DL<=0.05 MIU/L-ACNC: 4.08 UIU/ML (ref 0.45–4.5)
WBC # BLD AUTO: 11.02 THOUSAND/UL (ref 4.31–10.16)

## 2023-02-07 ENCOUNTER — OFFICE VISIT (OUTPATIENT)
Dept: DIABETES SERVICES | Facility: CLINIC | Age: 33
End: 2023-02-07

## 2023-02-07 VITALS — WEIGHT: 293 LBS | BODY MASS INDEX: 56.63 KG/M2

## 2023-02-07 DIAGNOSIS — E11.42 TYPE 2 DIABETES MELLITUS WITH DIABETIC POLYNEUROPATHY, WITHOUT LONG-TERM CURRENT USE OF INSULIN (HCC): Primary | ICD-10-CM

## 2023-02-07 NOTE — PROGRESS NOTES
Medical Nutrition Therapy        Assessment    Visit Type: Initial visit  Chief complaint/Medical Diagnosis/reason for visit E11 42 (T2DM with Diabetic Polyneuropathy, without long term insulin)    HPI Shira Nicolas was seen today for her initial MNT visit  Lola's most recent A1C was 6 1  She reports a recent planned weight loss of 30 pounds and would like to see continued los loss  Patient states that she is having gallbladder issues and is trying to modify her lifestyle to avoid surgery  Shira Nicolas reports losing 85 pounds in the past by consuming 3 meals a day and following a balanced diet  Problems identified in food recall include inconsistent and sub-optimal carbohydrate intake  Provided patient with guidelines for a 7416-9372 calorie meal plan to assist with consistency, balance and portion control  Encouraged the consumption of regular meals at regular times  Advised patient to keep carbohydrate intake to 45-60 grams per meal to assist with glycemic control  Suggested keeping protein intake to 8 ounces a day and fat to 5 servings daily to assist with lipid management and calorie control  Portion booklet and food labels were used to teach basic carbohydrate counting  Patient will schedule follow-up after she completes other scheduled visits  RD will remain available for further dietary questions/concerns       Ht Readings from Last 1 Encounters:   01/31/23 5' 2" (1 575 m)     Wt Readings from Last 3 Encounters:   02/07/23 (!) 140 kg (309 lb 9 6 oz)   01/31/23 (!) 141 kg (310 lb)   01/03/23 (!) 139 kg (307 lb)     Weight Change: Yes planned weight loss of 30 pounds (met with [facility] weight management and was on Weg[place]e and Ozempic)    Barriers to Learning: no barriers    Do you follow any special diet presently?: Yes - low fat, low processed, low lactose, low carb, gluten  Who shops: patient  Who cooks: patient    Food Log: Completed via the method of food recall    Breakfast:8:00-9:30AM celery with PB OR eggs with 2 slices of buttered toast OR apple with PB, water  Morning Snack:none  Lunch:12:00-1:00PM salad with chicken and Caesar or [place] or balsamic dressing and some croutons OR turkey on a wheat wrap with manuel and cheese, water  Afternoon Snack: none  Dinner:5:00-6:00PM chicken or turkey burger no bun, broccoli or cauliflower, water  Evening Snack:none  Beverages: water  Eating out/Take out:once a week  Exercise nothing beyond daily activities     Calorie needs 1600-1800kcals/day Carbs: 45-60g/meal     Fat: 5 servings/day    Protein:8 ounces/day    Nutrition Diagnosis:  Food and nutrition related knowledge deficit  related to Lack of prior exposure to accurate nutrition related information as evidenced by No prior knowledge of need for food and nutrition related recommendations    Intervention: plate method, label reading, behavior modification strategies, carbohydrate counting, meal planning, individualized meal plan and exercise guidelines     Treatment Goals: Patient will monitor fat intake, Patient will count carbohydrates, Patient will exercise and Protein intake  Monitoring and evaluation:    Term code indicator  FH 1 6 3 Carbohydrate Intake Criteria: 45-60 grams of carbs per meal   Term code indicator  FH 1 6 2 Protein Intake Criteria: 2 8 ounces lean protein daily  Term code indicator  FH 1 6 1 Fat and Cholesterol Intake Criteria: 3 5 added fats daily  Term code indicator  CH 2 2 Treatments/Therapy/Alternative Medicine Criteria: 4  Initiate regular aerobic exercise  Materials Provided: Portion booklet with meal plan    Patient’s Response to Instruction:  Comprehensiongood  Motivationgood  Expected Compliancegood    Start- Stop: 1:00-2:00  Total Minutes: 60 Minutes  Group or Individual Instruction: MNT-I  Other: SHERMAN Bryan    Thank you for coming to the [facility] [address] [place] [address] for education today  Please feel free to call with any questions or concerns      Tyler Mccarthy  [address]  [facility] [id number]

## 2023-02-07 NOTE — PATIENT INSTRUCTIONS
45-60 grams of carbs per meal   8 ounces lean protein daily  5 added fats daily  Initiate regular aerobic exercise

## 2023-02-08 ENCOUNTER — TELEPHONE (OUTPATIENT)
Dept: FAMILY MEDICINE CLINIC | Facility: CLINIC | Age: 33
End: 2023-02-08

## 2023-02-08 DIAGNOSIS — D72.829 LEUKOCYTOSIS, UNSPECIFIED TYPE: Primary | ICD-10-CM

## 2023-02-08 NOTE — TELEPHONE ENCOUNTER
She stated at her last appointment that she saw Hematology regarding issues with her CBC in the past who recommended bone marrow biopsy  She did not get it done  She is scared about anesthesia due to her weight  If she would like to be evaluated again I can place referral to Hematology or she can return to where she was seen before

## 2023-02-08 NOTE — TELEPHONE ENCOUNTER
----- Message from [employer] sent at 2/8/2023  8:01 AM EST -----  Labs show a1c is well controlled  Triglycerides are elevated  Watch intake of fatty foods  The rest of her labs are stable

## 2023-02-08 NOTE — TELEPHONE ENCOUNTER
Pt aware of results     Pt would like to know if there is any concern of her platelets being high again

## 2023-02-15 ENCOUNTER — CONSULT (OUTPATIENT)
Dept: BARIATRICS | Facility: CLINIC | Age: 33
End: 2023-02-15

## 2023-02-15 VITALS
HEIGHT: 62 IN | SYSTOLIC BLOOD PRESSURE: 132 MMHG | DIASTOLIC BLOOD PRESSURE: 80 MMHG | BODY MASS INDEX: 53.92 KG/M2 | HEART RATE: 90 BPM | RESPIRATION RATE: 16 BRPM | WEIGHT: 293 LBS

## 2023-02-15 DIAGNOSIS — E66.01 CLASS 3 SEVERE OBESITY DUE TO EXCESS CALORIES WITH SERIOUS COMORBIDITY AND BODY MASS INDEX (BMI) OF 50.0 TO 59.9 IN ADULT (HCC): Primary | ICD-10-CM

## 2023-02-15 DIAGNOSIS — E11.9 DM2 (DIABETES MELLITUS, TYPE 2) (HCC): ICD-10-CM

## 2023-02-15 DIAGNOSIS — I10 ESSENTIAL HYPERTENSION: ICD-10-CM

## 2023-02-15 DIAGNOSIS — F39 MOOD DISORDER (HCC): ICD-10-CM

## 2023-02-15 DIAGNOSIS — E78.5 DYSLIPIDEMIA: ICD-10-CM

## 2023-02-15 RX ORDER — BUPROPION HYDROCHLORIDE 300 MG/1
300 TABLET ORAL DAILY
Qty: 30 TABLET | Refills: 1 | Status: SHIPPED | OUTPATIENT
Start: 2023-02-15

## 2023-02-15 NOTE — PATIENT INSTRUCTIONS
Increase Wellbutrin to 300mg daily  STOP amitriptyline 50mg nightly as this may be causing your fatigue and is also weight promoting  Contact me with an update in 3-4 weeks  If you are having issues with cravings then we can stat naltrexone to mimic the effect of Contrave  [id number] calorie diet  5000 steps a day minimum  Water 64oz of water a day  Follow-up with your general surgeon  I do not recommend restarting  a GLP1 receptor agonist such as ozempic, wegovy, or mounjaro unless you have a normal HIDA scan  Follow-up with me in 2 months

## 2023-02-15 NOTE — PROGRESS NOTES
Assessment/Plan:  Arnel Williamson was seen today for consult  Diagnoses and all orders for this visit:    Class 3 severe obesity due to excess calories with serious comorbidity and body mass index (BMI) of 50 0 to 59 9 in [facility])  -     Ambulatory Referral to Weight Management  -     buPROPion (Wellbutrin XL) 300 mg 24 hr tablet; Take 1 tablet (300 mg total) by mouth daily    DM2 (diabetes mellitus, type 2) (HCC)    Mood disorder (HCC)  -     buPROPion (Wellbutrin XL) 300 mg 24 hr tablet; Take 1 tablet (300 mg total) by mouth daily    Dyslipidemia    Essential hypertension       Class 3 severe obesity due to excess calories with serious comorbidity and body mass index (BMI) of 50 0 to 59 9 in [facility])  Patient was hopeful to restart GLP-1 receptor agonist   I discussed with her that she had an abnormal HIDA scan and was recently hospitalized for gallbladder related issue  I do not think that this is a good time to restart the medication  If she has been asymptomatic and has a normal HIDA scan and then consideration to restart GLP-1 receptor agonist would be reasonable at that time  She reports significant fatigue which may have happened around the time that amitriptyline was increased  She also reports lack of motivation and feeling down and depressed  I recommend that she stop this medication at this time and increase Wellbutrin to 300 mg daily  He is to contact me with an update in 3 to 4 weeks and follow-up with her PCP in 3 to 4 weeks  We will follow-up with me in 2 months  At that time we will consider adding naltrexone to mimic the effect of Contrave  5752-0172 calorie diet  5000 steps a day minimum  64oz of water a day  Follow-up with your general surgeon  I do not recommend restarting  a GLP1 receptor agonist such as ozempic, wegovy, or mounjaro unless you have a normal HIDA scan  Patient demonstrated understanding and agreement with the plan           Total time spent reviewing chart, interviewing patient, examining patient, discussing plan, answering all questions, and documentin min        ______________________________________________________________________        Subjective:   Chief Complaint   Patient presents with   • Consult     MWM consult; waist 58 5in; goal wt 260; stop bang 3-8       HPI: Silvia Andrade  is a 28 y o  female with history of 2 diabetes, mood disorder, dyslipidemia, hypertension and excess weight, recent cholangitis, here to pursue weight loss management  Previous notes and records have been reviewed  HPI  Wt Readings from Last 20 Encounters:   02/15/23 (!) 140 kg (309 lb 9 6 oz)   23 (!) 140 kg (309 lb 9 6 oz)   23 (!) 141 kg (310 lb)   23 (!) 139 kg (307 lb)   23 (!) 139 kg (307 lb 1 6 oz)   22 (!) 139 kg (307 lb)   22 (!) 139 kg (307 lb 6 4 oz)   22 (!) 147 kg (324 lb)   22 (!) 149 kg (328 lb)   22 (!) 152 kg (335 lb)   22 (!) 153 kg (336 lb 12 8 oz)   22 (!) 151 kg (333 lb)   22 (!) 154 kg (339 lb 12 8 oz)   22 (!) 155 kg (341 lb)   22 (!) 153 kg (337 lb)   21 (!) 151 kg (333 lb 3 2 oz)   12/10/21 (!) 153 kg (336 lb 12 8 oz)   11/15/21 (!) 153 kg (337 lb 6 4 oz)   21 (!) 155 kg (341 lb)   21 (!) 155 kg (342 lb 9 6 oz)     Excess Weight:  Onset:  " I was always heavy but in 2017 I was 268 and dropped to 185lbs    Weight increased since becoming since having my daughter 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"   Highest weight: 342  Current weight: 309  What has been tried: Diet and Exercise and Physician Supervised Weight Loss Program   Ozempic/Wegovy  Metformin      Contributing factors: Poor Food Choices, Insufficient Physical Activity and Stress/Emotional Eating  Associated symptoms and effects: fatigue and increased joint pain    Food logging:  B: Apple Jacks cereal and 2%milk  S: No  L:Turkey wrap or chicken salad or left overs  S: No  D: [place] ([facility]) burger or chicken or pork chops with vegetables  S:  No    Hunger/Cravings: Cravings for sweets  Dining out:  Once a week  Hydration:  Water 40-80  Alcohol:  No  Smoking:  No  Exercise:  No  Weight Monitoring:  Daily  Sleep:  8+  STOP-BANG Score: 3/8  Occupation:  Homemaker      Past Medical History:   Diagnosis Date   • Abnormal Pap smear of cervix    • Anxiety    • Diabetes ([address] )    • Eating disorder    • Gallstone    • Genital herpes    • History of gallstones 2018   • HPV (human papilloma virus) infection    • Hypertension    • Varicella     imm     Patient denies personal and family history of  pancreatitis, thyroid cancer, MEN-2 tumors  Denies any hx of glaucoma, seizures, kidney stones, gallstones  Denies Hx of CAD, PAD, palpitations, arrhythmia  Denies uncontrolled anxiety or depression, suicidal behavior or thinking , insomnia or sleep disturbance  Past Surgical History:   Procedure Laterality Date   •  SECTION     • COLPOSCOPY     • [place]  DELIVERY ONLY N/A 2019    Procedure:  SECTION (); Surgeon: Brian Rodriguez MD;  Location: [facility];  Service: Obstetrics     The following portions of the patient's history were reviewed and updated as appropriate: allergies, current medications, past family history, past medical history, past social history, past surgical history, and problem list     Review Of Systems:  Review of Systems    Objective:  /80   Pulse 90   Resp 16   Ht 5' 2" (1 575 m)   Wt (!) 140 kg (309 lb 9 6 oz)   BMI 56 63 kg/m²   Physical Exam    Labs and Imaging  Recent labs and imaging have been personally reviewed    Lab Results   Component Value Date    WBC 11 02 (H) 2023    HGB 13 4 2023    HCT 44 4 2023    MCV 86 2023     (H) 2023     Lab Results   Component Value Date    SODIUM 138 2023    K 3 9 2023     2023    CO2 27 2023    AGAP 7 2023    BUN 14 2023    CREATININE 0 75 02/04/2023    GLUC 114 (H) 08/11/2021    GLUF 111 (H) 02/04/2023    CALCIUM 9 1 02/04/2023    AST 12 02/04/2023    ALT 26 02/04/2023    ALKPHOS 78 02/04/2023    TP 7 2 02/04/2023    TBILI 0 23 02/04/2023    EGFR 105 02/04/2023     Lab Results   Component Value Date    HGBA1C 6 1 (H) 02/04/2023     Lab Results   Component Value Date    PNG3AONOEOTA 4 080 02/04/2023     Lab Results   Component Value Date    CHOLESTEROL 190 02/04/2023     Lab Results   Component Value Date    HDL 60 02/04/2023     Lab Results   Component Value Date    TRIG 195 (H) 02/04/2023     Lab Results   Component Value Date    LDLCALC 91 02/04/2023

## 2023-02-15 NOTE — ASSESSMENT & PLAN NOTE
Patient was hopeful to restart GLP-1 receptor agonist   I discussed with her that she had an abnormal HIDA scan and was recently hospitalized for gallbladder related issue  I do not think that this is a good time to restart the medication  If she has been asymptomatic and has a normal HIDA scan and then consideration to restart GLP-1 receptor agonist would be reasonable at that time  She reports significant fatigue which may have happened around the time that amitriptyline was increased  She also reports lack of motivation and feeling down and depressed  I recommend that she stop this medication at this time and increase Wellbutrin to 300 mg daily  He is to contact me with an update in 3 to 4 weeks and follow-up with her PCP in 3 to 4 weeks  We will follow-up with me in 2 months  At that time we will consider adding naltrexone to mimic the effect of Contrave  [id number] calorie diet  5000 steps a day minimum  64oz of water a day  Follow-up with your general surgeon  I do not recommend restarting  a GLP1 receptor agonist such as ozempic, wegovy, or mounjaro unless you have a normal HIDA scan  Patient demonstrated understanding and agreement with the plan

## 2023-02-21 ENCOUNTER — TELEPHONE (OUTPATIENT)
Dept: ADMINISTRATIVE | Facility: OTHER | Age: 33
End: 2023-02-21

## 2023-02-21 ENCOUNTER — OFFICE VISIT (OUTPATIENT)
Dept: FAMILY MEDICINE CLINIC | Facility: CLINIC | Age: 33
End: 2023-02-21

## 2023-02-21 VITALS
OXYGEN SATURATION: 97 % | SYSTOLIC BLOOD PRESSURE: 130 MMHG | HEIGHT: 62 IN | WEIGHT: 293 LBS | DIASTOLIC BLOOD PRESSURE: 72 MMHG | HEART RATE: 84 BPM | TEMPERATURE: 98.3 F | BODY MASS INDEX: 53.92 KG/M2

## 2023-02-21 DIAGNOSIS — K21.9 GASTROESOPHAGEAL REFLUX DISEASE WITHOUT ESOPHAGITIS: ICD-10-CM

## 2023-02-21 DIAGNOSIS — K80.20 CALCULUS OF GALLBLADDER WITHOUT CHOLECYSTITIS WITHOUT OBSTRUCTION: ICD-10-CM

## 2023-02-21 DIAGNOSIS — E11.42 TYPE 2 DIABETES MELLITUS WITH DIABETIC POLYNEUROPATHY, WITHOUT LONG-TERM CURRENT USE OF INSULIN (HCC): ICD-10-CM

## 2023-02-21 DIAGNOSIS — I10 ESSENTIAL HYPERTENSION: Primary | ICD-10-CM

## 2023-02-21 PROBLEM — F41.9 ANXIETY: Status: ACTIVE | Noted: 2023-02-21

## 2023-02-21 RX ORDER — METRONIDAZOLE 500 MG/1
TABLET ORAL
COMMUNITY
Start: 2023-02-19

## 2023-02-21 NOTE — LETTER
Diabetic Eye Exam Form    Date Requested: 23  Patient: Brooklyn Lopez  Patient : 1990   Referring Provider: SHERMAN Weir      DIABETIC Eye Exam Date _______________________________      Type of Exam MUST be documented for Diabetic Eye Exams  Please CHECK ONE  Retinal Exam       Dilated Retinal Exam       OCT       Optomap-Iris Exam      Fundus Photography       Left Eye - Please check Retinopathy or No Retinopathy        Exam did show retinopathy    Exam did not show retinopathy       Right Eye - Please check Retinopathy or No Retinopathy       Exam did show retinopathy    Exam did not show retinopathy       Comments __please fill out and fax back this sheet see below  ________________________________________________________    Practice Providing Exam ______________________________________________    Exam Performed By (print name) _______________________________________      Provider Signature ___________________________________________________      These reports are needed for  compliance  Please fax this completed form and a copy of the Diabetic Eye Exam report to our office located at [place] [address] as soon as possible via Fax [phone] leslie Oliver Sin: Phone [phone]  We thank you for your assistance in treating our mutual patient

## 2023-02-21 NOTE — LETTER
Diabetic Eye Exam Form    Date Requested: 23  Patient: Marianne Camp  Patient : 1990   Referring Provider: SHERMAN Barlow      DIABETIC Eye Exam Date _______________________________      Type of Exam MUST be documented for Diabetic Eye Exams  Please CHECK ONE  Retinal Exam       Dilated Retinal Exam       OCT       Optomap-Iris Exam      Fundus Photography       Left Eye - Please check Retinopathy or No Retinopathy        Exam did show retinopathy    Exam did not show retinopathy       Right Eye - Please check Retinopathy or No Retinopathy       Exam did show retinopathy    Exam did not show retinopathy       Comments __________________________________________________________    Practice Providing Exam ______________________________________________    Exam Performed By (print name) _______________________________________      Provider Signature ___________________________________________________      These reports are needed for  compliance  Please fax this completed form and a copy of the Diabetic Eye Exam report to our office located at [place] [address] as soon as possible via Fax [phone] leslie Avalos Jackson: Phone [phone]  We thank you for your assistance in treating our mutual patient

## 2023-02-21 NOTE — PROGRESS NOTES
Name: Yasmani Cotton      : 1990      MRN: [id number]  Encounter Provider: SHERMAN Bennett  Encounter Date: 2023   Encounter department: [address] [place] [address]  Essential hypertension  Assessment & Plan:  - Well controlled on Diovan  Continue same    - Will continue to monitor  2  Gastroesophageal reflux disease without esophagitis  Assessment & Plan:  - Stable on omeprazole daily  Continue same    - Avoid triggering food and beverage   - Will continue to monitor  3  Type 2 diabetes mellitus with diabetic polyneuropathy, without long-term current use of insulin ([address] )  Assessment & Plan:  - Well controlled on no current medications  - Continue to work on diet and exercise  - Will continue to monitor  Lab Results   Component Value Date    HGBA1C 6 1 (H) 2023         4  Calculus of gallbladder without cholecystitis without obstruction  Assessment & Plan:  - Patient scheduled for robotic cholecystectomy  - Continue follow up with General Surgery  Subjective     Patient presents today for follow up  She was recently in the ER with RUQ pain  US showed no cholecystitis  She was diagnosed with biliary colic  She was given antibiotics due to elevated WBC  She is following up with general surgery outpatient for robotic cholecystectomy  She was started on Wellbutrin last visit for depression  She feels it made her anxiety worse  Complains of rapid heart beat and not being able to sleep  She has a lot of anxiety about upcoming surgery  She has blood work done recently which showed her hemoglobin A1c is well controlled at 6 1       Review of Systems   Constitutional: Negative for fatigue and fever  HENT: Negative for trouble swallowing  Eyes: Negative for visual disturbance  Respiratory: Negative for cough and shortness of breath  Cardiovascular: Negative for chest pain and palpitations     Gastrointestinal: Positive for abdominal distention, abdominal pain and diarrhea (chronic due to IBS)  Negative for blood in stool  Endocrine: Negative for cold intolerance and heat intolerance  Genitourinary: Negative for difficulty urinating and dysuria  Musculoskeletal: Negative for gait problem  Skin: Negative for rash  Neurological: Negative for dizziness, syncope and headaches  Hematological: Negative for adenopathy  Psychiatric/Behavioral: Negative for behavioral problems  The patient is nervous/anxious  Past Medical History:   Diagnosis Date   • Abnormal Pap smear of cervix    • Anxiety    • Diabetes ([address] )    • Eating disorder    • Gallstone    • Genital herpes    • History of gallstones 2018   • HPV (human papilloma virus) infection    • Hypertension    • Varicella     imm     Past Surgical History:   Procedure Laterality Date   •  SECTION     • COLPOSCOPY     • [place]  DELIVERY ONLY N/A 2019    Procedure:  SECTION ();   Surgeon: Zackary Collins MD;  Location: [facility];  Service: Obstetrics     Family History   Problem Relation Age of Onset   • Hypertension Mother    • Hyperlipidemia Mother    • Asthma Mother    • Miscarriages / Stillbirths Mother    • Lung cancer Mother    • Liver cancer Mother    • Brain cancer Mother    • Pulmonary embolism Mother    • No Known Problems Father    • Drug abuse Brother    • Mental illness Brother    • No Known Problems Daughter    • Hypertension Maternal Grandmother    • Diabetes Maternal Grandmother         type 2   • Hearing loss Maternal Grandmother    • Early death Maternal Grandmother    • Stroke Maternal Grandmother    • Cervical cancer Maternal Grandmother    • Hyperlipidemia Maternal Aunt    • Early death Maternal Aunt    • Brain cancer Maternal Aunt    • Lung cancer Maternal Aunt      Social History     Socioeconomic History   • Marital status: Single     Spouse name: Cecile Rodriguez   • Number of children: None   • Years of education: [employer]   • Highest education level: None   Occupational History   • Occupation: MA   Tobacco Use   • Smoking status: Never   • Smokeless tobacco: Never   Vaping Use   • Vaping Use: Never used   Substance and Sexual Activity   • Alcohol use: Not Currently   • Drug use: Never   • Sexual activity: Yes     Partners: Male     Birth control/protection: OCP   Other Topics Concern   • None   Social History Narrative   • None     Social Determinants of Health     Financial Resource Strain: Not on file   Food Insecurity: Not on file   Transportation Needs: Not on file   Physical Activity: Not on file   Stress: Not on file   Social Connections: Not on file   Intimate Partner Violence: Not on file   Housing Stability: Not on file     Current Outpatient Medications on File Prior to Visit   Medication Sig   • drospirenone-ethinyl estradiol (TATY) 3-0 02 MG per tablet Take 1 tablet by mouth daily   • metroNIDAZOLE (FLAGYL) 500 mg tablet    • omeprazole (PriLOSEC) 40 MG capsule    • sucralfate (CARAFATE) 1 g/10 mL suspension Take 10 mL (1 g total) by mouth 4 (four) times a day (with meals and at bedtime)   • valsartan-hydrochlorothiazide (DIOVAN-HCT) 320-25 MG per tablet TAKE 1 TABLET BY MOUTH EVERY DAY   • Biotin 5 MG TBDP    • buPROPion (Wellbutrin XL) 300 mg 24 hr tablet Take 1 tablet (300 mg total) by mouth daily     Allergies   Allergen Reactions   • Erythromycin Shortness Of Breath   • Penicillins Hives and Other (See Comments)     Mom told pt she had reaction when she was an infant, but can have Amoxicillin     • Medical Tape Rash     Immunization History   Administered Date(s) Administered   • COVID-19 PFIZER VACCINE 0 3 ML IM 04/09/2021, 04/30/2021   • HPV9 09/10/2019, 12/03/2019, 07/24/2020   • INFLUENZA 11/01/2020   • Influenza Quadrivalent Preservative Free 3 years and older IM 10/22/2021   • Influenza, injectable, quadrivalent, preservative free 0 5 mL 11/14/2018   • Pneumococcal Polysaccharide PPV23 10/18/2021   • Rabies 09/15/2011, 09/18/2011, 09/22/2011, 09/29/2011   • Tdap 09/15/2011, 03/12/2019   • Tuberculin Skin Test-PPD Intradermal 04/22/2022       Objective     /72 (BP Location: Left arm, Patient Position: Sitting, Cuff Size: Large)   Pulse 84   Temp 98 3 °F (36 8 °C) (Tympanic)   Ht 5' 2" (1 575 m)   Wt 136 kg (299 lb 3 2 oz)   SpO2 97%   BMI 54 72 kg/m²     Physical Exam  Vitals and nursing note reviewed  Constitutional:       Appearance: Normal appearance  HENT:      Head: Normocephalic and atraumatic  Right Ear: External ear normal       Left Ear: External ear normal    Eyes:      Conjunctiva/sclera: Conjunctivae normal    Cardiovascular:      Rate and Rhythm: Normal rate and regular rhythm  Heart sounds: Normal heart sounds  Pulmonary:      Effort: Pulmonary effort is normal       Breath sounds: Normal breath sounds  Abdominal:      General: Bowel sounds are normal       Palpations: Abdomen is soft  Tenderness: There is abdominal tenderness in the right upper quadrant and right lower quadrant  There is no guarding or rebound  Musculoskeletal:         General: Normal range of motion  Cervical back: Normal range of motion  Skin:     General: Skin is warm and dry  Neurological:      Mental Status: She is alert and oriented to person, place, and time  Cranial Nerves: No cranial nerve deficit     Psychiatric:         Mood and Affect: Mood normal          Behavior: Behavior normal        SHERMAN Reynoso

## 2023-02-21 NOTE — TELEPHONE ENCOUNTER
----- Message from Delaney Boyd sent at 2/21/2023 11:15 AM EST -----  Regarding: DM eye exam  02/21/23 11:16 AM    Hello, our patient Rebecca Rey has had diabetic eye exam  completed/performed  Please assist in updating the patient chart by [facility]  The date of service is 2023      Thank you,  Delaney Boyd  [facility] [address]

## 2023-02-21 NOTE — ASSESSMENT & PLAN NOTE
- Stable on omeprazole daily  Continue same    - Avoid triggering food and beverage   - Will continue to monitor

## 2023-02-21 NOTE — LETTER
Diabetic Eye Exam Form    Date Requested: 23  Patient: Brooklyn Lopez  Patient : 1990   Referring Provider: SHERMAN Weir      DIABETIC Eye Exam Date _______________________________      Type of Exam MUST be documented for Diabetic Eye Exams  Please CHECK ONE  Retinal Exam       Dilated Retinal Exam       OCT       Optomap-Iris Exam      Fundus Photography       Left Eye - Please check Retinopathy or No Retinopathy        Exam did show retinopathy    Exam did not show retinopathy       Right Eye - Please check Retinopathy or No Retinopathy       Exam did show retinopathy    Exam did not show retinopathy       Comments __please fill out & fax back completed form to [phone] ________________________________________________________    Practice Providing Exam ______________________________________________    Exam Performed By (print name) _______________________________________      Provider Signature ___________________________________________________      These reports are needed for  compliance  Please fax this completed form and a copy of the Diabetic Eye Exam report to our office located at [place] [address] as soon as possible via Fax [phone] leslie Oliver Sin: Phone [phone]  We thank you for your assistance in treating our mutual patient

## 2023-02-21 NOTE — ASSESSMENT & PLAN NOTE
- Well controlled on no current medications  - Continue to work on diet and exercise  - Will continue to monitor     Lab Results   Component Value Date    HGBA1C 6 1 (H) 02/04/2023

## 2023-02-27 NOTE — TELEPHONE ENCOUNTER
As a follow-up, a second attempt has been made for outreach via telephone call to facility  The outcome of the telephone call was a fax request form to be sent to Office/Facility  Please see Contacts section for details       Called and fx x2    Thank you  Ania Lane

## 2023-03-03 ENCOUNTER — TELEPHONE (OUTPATIENT)
Dept: FAMILY MEDICINE CLINIC | Facility: CLINIC | Age: 33
End: 2023-03-03

## 2023-03-03 ENCOUNTER — TELEPHONE (OUTPATIENT)
Dept: BARIATRICS | Facility: CLINIC | Age: 33
End: 2023-03-03

## 2023-03-03 DIAGNOSIS — K21.9 GASTROESOPHAGEAL REFLUX DISEASE WITHOUT ESOPHAGITIS: Primary | ICD-10-CM

## 2023-03-03 RX ORDER — OMEPRAZOLE 40 MG/1
40 CAPSULE, DELAYED RELEASE ORAL DAILY
Qty: 30 CAPSULE | Refills: 3 | Status: SHIPPED | OUTPATIENT
Start: 2023-03-03

## 2023-03-06 NOTE — TELEPHONE ENCOUNTER
As a final attempt, a third outreach has been made via fax to facility  Please see Contacts section for details  This encounter will be closed and completed by end of day  Should we receive the requested information because of previous outreach attempts, the requested patient's chart will be updated appropriately        x3 fx    Thank you  Susi Arredondo

## 2023-03-21 ENCOUNTER — OFFICE VISIT (OUTPATIENT)
Dept: FAMILY MEDICINE CLINIC | Facility: CLINIC | Age: 33
End: 2023-03-21

## 2023-03-21 VITALS
BODY MASS INDEX: 52.81 KG/M2 | RESPIRATION RATE: 16 BRPM | WEIGHT: 287 LBS | HEART RATE: 91 BPM | OXYGEN SATURATION: 99 % | DIASTOLIC BLOOD PRESSURE: 76 MMHG | TEMPERATURE: 98.4 F | SYSTOLIC BLOOD PRESSURE: 122 MMHG | HEIGHT: 62 IN

## 2023-03-21 DIAGNOSIS — R35.0 URINARY FREQUENCY: ICD-10-CM

## 2023-03-21 DIAGNOSIS — K80.20 CALCULUS OF GALLBLADDER WITHOUT CHOLECYSTITIS WITHOUT OBSTRUCTION: ICD-10-CM

## 2023-03-21 DIAGNOSIS — F41.9 ANXIETY: Primary | ICD-10-CM

## 2023-03-21 DIAGNOSIS — E11.42 TYPE 2 DIABETES MELLITUS WITH DIABETIC POLYNEUROPATHY, WITHOUT LONG-TERM CURRENT USE OF INSULIN (HCC): ICD-10-CM

## 2023-03-21 DIAGNOSIS — I10 ESSENTIAL HYPERTENSION: ICD-10-CM

## 2023-03-21 DIAGNOSIS — K21.9 GASTROESOPHAGEAL REFLUX DISEASE WITHOUT ESOPHAGITIS: ICD-10-CM

## 2023-03-21 LAB
SL AMB  POCT GLUCOSE, UA: ABNORMAL
SL AMB LEUKOCYTE ESTERASE,UA: ABNORMAL
SL AMB POCT BILIRUBIN,UA: ABNORMAL
SL AMB POCT BLOOD,UA: POSITIVE
SL AMB POCT CLARITY,UA: CLEAR
SL AMB POCT COLOR,UA: ABNORMAL
SL AMB POCT KETONES,UA: POSITIVE
SL AMB POCT NITRITE,UA: ABNORMAL
SL AMB POCT PH,UA: 6
SL AMB POCT SPECIFIC GRAVITY,UA: 1.02
SL AMB POCT URINE PROTEIN: POSITIVE
SL AMB POCT UROBILINOGEN: ABNORMAL

## 2023-03-21 NOTE — ASSESSMENT & PLAN NOTE
- Urine dip positive for leuks, blood, protein, and ketones  Will send for culture  - Will continue to follow up

## 2023-03-21 NOTE — ASSESSMENT & PLAN NOTE
- Not well controlled  - Referred to Psychiatry  - She was also given phone numbers to different mental health resources  - Will continue to monitor

## 2023-03-21 NOTE — ASSESSMENT & PLAN NOTE
- Well controlled on no current medications  - She is down 12 pounds since her last visit  - Continue healthy diet and regular exercise  - Will continue to monitor     Lab Results   Component Value Date    HGBA1C 6 1 (H) 02/04/2023

## 2023-03-21 NOTE — ASSESSMENT & PLAN NOTE
- Complains of increased GERD symptoms s/p cholecystectomy  - Continue omeprazole in the morning  Can try Pepcid at night    - Avoid triggering food and beverage   - Continue routine follow up with GI

## 2023-03-21 NOTE — PROGRESS NOTES
Name: Savana Joseph      : 1990      MRN: [id number]  Encounter Provider: SHERMAN Navarrete  Encounter Date: 3/21/2023   Encounter department: [address] [place] [address]  Anxiety  Assessment & Plan:  - Not well controlled  - Referred to Psychiatry  - She was also given phone numbers to different mental health resources  - Will continue to monitor  Orders:  -     Ambulatory Referral to Psychiatry; Future    2  Urinary frequency  Assessment & Plan:  - Urine dip positive for leuks, blood, protein, and ketones  Will send for culture  - Will continue to follow up  Orders:  -     Urine culture; Future  -     POCT urine dip  -     Urine culture    3  Gastroesophageal reflux disease without esophagitis  Assessment & Plan:  - Complains of increased GERD symptoms s/p cholecystectomy  - Continue omeprazole in the morning  Can try Pepcid at night  - Avoid triggering food and beverage   - Continue routine follow up with GI        4  Calculus of gallbladder without cholecystitis without obstruction  Assessment & Plan:  - s/p cholecystectomy on 3/8   - Continue routine follow up with General Surgery  5  Type 2 diabetes mellitus with diabetic polyneuropathy, without long-term current use of insulin ([facility] [address] )  Assessment & Plan:  - Well controlled on no current medications  - She is down 12 pounds since her last visit  - Continue healthy diet and regular exercise  - Will continue to monitor  Lab Results   Component Value Date    HGBA1C 6 1 (H) 2023         6  Essential hypertension  Assessment & Plan:  - Well controlled on Diovan- Continue same    - Will continue to monitor  Subjective     Patient presents today for follow up  On 3/8 she underwent laparoscopic cholecystectomy  Did well post-op and was discharged home  She is still complaining of abdominal pain  Is able to eat and drink   Her surgeon is monitoring her liver enzymes which have been normal  She had a RUQ ultrasound on 3/13 that showed no acute findings s/p cholecystectomy  Did show hepatic steatosis which is not new  Patient also requesting urine sample as she thinks she is dehydrated  Denies any UTI symptoms  Is also requesting referral to a therapist          Diabetes  She has type 2 diabetes mellitus  No MedicAlert identification noted  Hypoglycemia symptoms include mood changes and nervousness/anxiousness  Pertinent negatives for hypoglycemia include no confusion, dizziness, headaches, hunger, pallor, seizures, sleepiness, speech difficulty, sweats or tremors  Associated symptoms include foot paresthesias, polyuria and weight loss  Pertinent negatives for diabetes include no blurred vision, no chest pain, no fatigue, no foot ulcerations, no polydipsia, no polyphagia, no visual change and no weakness  Pertinent negatives for hypoglycemia complications include no blackouts, no hospitalization, no nocturnal hypoglycemia, no required assistance and no required glucagon injection  Symptoms are stable  Pertinent negatives for diabetic complications include no CVA, heart disease, impotence, nephropathy, peripheral neuropathy, PVD or retinopathy  Risk factors for coronary artery disease include hypertension, obesity and stress  When asked about current treatments, none were reported  She is compliant with treatment none of the time  Her weight is decreasing steadily  When asked about meal planning, she reported none  She has not had a previous visit with a dietitian  She never participates in exercise  There is no compliance with monitoring of blood glucose  She sees a podiatrist Eye exam is current  Review of Systems   Constitutional: Positive for weight loss  Negative for fatigue  HENT: Negative for congestion and trouble swallowing  Eyes: Negative for blurred vision and visual disturbance  Respiratory: Negative for choking and shortness of breath      Cardiovascular: Negative for chest pain  Gastrointestinal: Positive for abdominal pain  Negative for blood in stool  GERD   Endocrine: Positive for polyuria  Negative for polydipsia and polyphagia  Genitourinary: Negative for difficulty urinating, dysuria, hematuria, impotence and pelvic pain  Musculoskeletal: Negative for back pain  Skin: Negative for pallor  Neurological: Negative for dizziness, tremors, seizures, speech difficulty, weakness and headaches  Psychiatric/Behavioral: Negative for confusion  The patient is nervous/anxious  Past Medical History:   Diagnosis Date   • Abnormal Pap smear of cervix    • Anxiety    • Diabetes ([address] )    • Eating disorder    • Gallstone    • Genital herpes    • History of gallstones 2018   • HPV (human papilloma virus) infection    • Hypertension    • Varicella     imm     Past Surgical History:   Procedure Laterality Date   •  SECTION     • COLPOSCOPY     • [place]  DELIVERY ONLY N/A 2019    Procedure:  SECTION ();   Surgeon: Rupesh Osorio MD;  Location: [facility];  Service: Obstetrics     Family History   Problem Relation Age of Onset   • Hypertension Mother    • Hyperlipidemia Mother    • Asthma Mother    • Miscarriages / Stillbirths Mother    • Lung cancer Mother    • Liver cancer Mother    • Brain cancer Mother    • Pulmonary embolism Mother    • No Known Problems Father    • Drug abuse Brother    • Mental illness Brother    • No Known Problems Daughter    • Hypertension Maternal Grandmother    • Diabetes Maternal Grandmother         type 2   • Hearing loss Maternal Grandmother    • Early death Maternal Grandmother    • Stroke Maternal Grandmother    • Cervical cancer Maternal Grandmother    • Hyperlipidemia Maternal Aunt    • Early death Maternal Aunt    • Brain cancer Maternal Aunt    • Lung cancer Maternal Aunt      Social History     Socioeconomic History   • Marital status: Single     Spouse name: Nay Couch   • Number of children: None   • Years of education: [employer]   • Highest education level: None   Occupational History   • Occupation: MA   Tobacco Use   • Smoking status: Never   • Smokeless tobacco: Never   Vaping Use   • Vaping Use: Never used   Substance and Sexual Activity   • Alcohol use: Not Currently   • Drug use: Never   • Sexual activity: Yes     Partners: Male     Birth control/protection: OCP   Other Topics Concern   • None   Social History Narrative   • None     Social Determinants of Health     Financial Resource Strain: Not on file   Food Insecurity: Not on file   Transportation Needs: Not on file   Physical Activity: Not on file   Stress: Not on file   Social Connections: Not on file   Intimate Partner Violence: Not on file   Housing Stability: Not on file     Current Outpatient Medications on File Prior to Visit   Medication Sig   • drospirenone-ethinyl estradiol (TATY) 3-0 02 MG per tablet Take 1 tablet by mouth daily   • omeprazole (PriLOSEC) 40 MG capsule Take 1 capsule (40 mg total) by mouth daily   • valsartan-hydrochlorothiazide (DIOVAN-HCT) 320-25 MG per tablet TAKE 1 TABLET BY MOUTH EVERY DAY   • sucralfate (CARAFATE) 1 g/10 mL suspension Take 10 mL (1 g total) by mouth 4 (four) times a day (with meals and at bedtime) (Patient not taking: Reported on 3/21/2023)   • [DISCONTINUED] Biotin 5 MG TBDP    • [DISCONTINUED] buPROPion (Wellbutrin XL) 300 mg 24 hr tablet Take 1 tablet (300 mg total) by mouth daily   • [DISCONTINUED] metroNIDAZOLE (FLAGYL) 500 mg tablet      Allergies   Allergen Reactions   • Erythromycin Shortness Of Breath   • Penicillins Hives and Other (See Comments)     Mom told pt she had reaction when she was an infant, but can have Amoxicillin     • Medical Tape Rash     Immunization History   Administered Date(s) Administered   • COVID-19 PFIZER VACCINE 0 3 ML IM 04/09/2021, 04/30/2021   • HPV9 09/10/2019, 12/03/2019, 07/24/2020   • INFLUENZA 11/01/2020   • Influenza Quadrivalent Preservative Free 3 years and older IM 10/22/2021   • Influenza, injectable, quadrivalent, preservative free 0 5 mL 11/14/2018   • Pneumococcal Polysaccharide PPV23 10/18/2021   • Rabies 09/15/2011, 09/18/2011, 09/22/2011, 09/29/2011   • Tdap 09/15/2011, 03/12/2019   • Tuberculin Skin Test-PPD Intradermal 04/22/2022       Objective     /76 (BP Location: Left arm, Patient Position: Sitting, Cuff Size: Large)   Pulse 91   Temp 98 4 °F (36 9 °C) (Tympanic)   Resp 16   Ht 5' 2" (1 575 m)   Wt 130 kg (287 lb)   SpO2 99%   BMI 52 49 kg/m²     Physical Exam  Vitals and nursing note reviewed  Constitutional:       Appearance: Normal appearance  HENT:      Head: Normocephalic and atraumatic  Right Ear: External ear normal       Left Ear: External ear normal    Eyes:      Conjunctiva/sclera: Conjunctivae normal    Cardiovascular:      Rate and Rhythm: Normal rate and regular rhythm  Heart sounds: Normal heart sounds  Pulmonary:      Effort: Pulmonary effort is normal       Breath sounds: Normal breath sounds  Abdominal:      Palpations: Abdomen is soft  Tenderness: There is no abdominal tenderness  Comments: Surgical incisions well approximated with no signs of infection    Musculoskeletal:         General: Normal range of motion  Cervical back: Normal range of motion  Skin:     General: Skin is warm and dry  Neurological:      Mental Status: She is alert and oriented to person, place, and time  Cranial Nerves: No cranial nerve deficit     Psychiatric:         Mood and Affect: Mood normal          Behavior: Behavior normal        Simon Counter, CRNP

## 2023-03-23 LAB — BACTERIA UR CULT: NORMAL

## 2023-03-24 ENCOUNTER — TELEPHONE (OUTPATIENT)
Dept: FAMILY MEDICINE CLINIC | Facility: CLINIC | Age: 33
End: 2023-03-24

## 2023-03-24 NOTE — TELEPHONE ENCOUNTER
----- [id number] sent at 3/24/2023  1:06 PM EDT -----  Urine culture negative for infection

## 2023-03-26 DIAGNOSIS — K21.9 GASTROESOPHAGEAL REFLUX DISEASE WITHOUT ESOPHAGITIS: ICD-10-CM

## 2023-03-27 RX ORDER — OMEPRAZOLE 40 MG/1
40 CAPSULE, DELAYED RELEASE ORAL DAILY
Qty: 90 CAPSULE | Refills: 1 | Status: SHIPPED | OUTPATIENT
Start: 2023-03-27

## 2023-03-31 ENCOUNTER — TELEPHONE (OUTPATIENT)
Dept: HEMATOLOGY ONCOLOGY | Facility: CLINIC | Age: 33
End: 2023-03-31

## 2023-03-31 ENCOUNTER — TELEPHONE (OUTPATIENT)
Dept: FAMILY MEDICINE CLINIC | Facility: CLINIC | Age: 33
End: 2023-03-31

## 2023-03-31 NOTE — TELEPHONE ENCOUNTER
----- Message from Faraz Dejesus sent at 3/31/2023 10:03 AM EDT -----  Regarding: Gabapentin   Contact: [phone]  Good Morning,     I just seen my foot doctor for my neuropathy and he recommended restarting the Gabapentin since it helped in the past  He said I would need to make an appointment with you to discuss restarting it  Would I need to make an appointment or is that something that can be called into my pharmacy? Thanks so much     Emelina Park

## 2023-03-31 NOTE — TELEPHONE ENCOUNTER
Would you like pt to make an office visit to restart gabapentin for her neuropathy or can you just send it to the pharmacy?  Pt was on it in the past  (foot doctor suggested she start back on it)    Patient was seen 3/21/23  Please advise

## 2023-03-31 NOTE — TELEPHONE ENCOUNTER
As per notes in patient's chart patient is a current patient at [facility]     Referral has been closed

## 2023-04-06 ENCOUNTER — OFFICE VISIT (OUTPATIENT)
Dept: FAMILY MEDICINE CLINIC | Facility: CLINIC | Age: 33
End: 2023-04-06

## 2023-04-06 VITALS
DIASTOLIC BLOOD PRESSURE: 82 MMHG | HEART RATE: 88 BPM | OXYGEN SATURATION: 98 % | WEIGHT: 287.8 LBS | TEMPERATURE: 97.8 F | HEIGHT: 62 IN | BODY MASS INDEX: 52.96 KG/M2 | SYSTOLIC BLOOD PRESSURE: 132 MMHG

## 2023-04-06 DIAGNOSIS — K21.9 GASTROESOPHAGEAL REFLUX DISEASE WITHOUT ESOPHAGITIS: Primary | ICD-10-CM

## 2023-04-06 DIAGNOSIS — M62.838 MUSCLE SPASM: ICD-10-CM

## 2023-04-06 DIAGNOSIS — I10 ESSENTIAL HYPERTENSION: ICD-10-CM

## 2023-04-06 DIAGNOSIS — G62.9 NEUROPATHY: ICD-10-CM

## 2023-04-06 RX ORDER — METHOCARBAMOL 500 MG/1
500 TABLET, FILM COATED ORAL 3 TIMES DAILY PRN
Qty: 30 TABLET | Refills: 1 | Status: SHIPPED | OUTPATIENT
Start: 2023-04-06

## 2023-04-06 RX ORDER — ESOMEPRAZOLE MAGNESIUM 40 MG/1
40 CAPSULE, DELAYED RELEASE ORAL
Qty: 90 CAPSULE | Refills: 0 | Status: SHIPPED | OUTPATIENT
Start: 2023-04-06 | End: 2023-10-03

## 2023-04-06 NOTE — PROGRESS NOTES
Name: Alana Santos      : 1990      MRN: [id number]  Encounter Provider: SHERMAN Ortez  Encounter Date: 2023   Encounter department: [address] [place] [address]  Gastroesophageal reflux disease without esophagitis  Assessment & Plan:  - Not well controlled  - Suggested switching to Nexium  She is going to discuss with her GI provider   - Avoid triggering food and beverage   - Will continue to monitor  Orders:  -     esomeprazole (NexIUM) 40 MG capsule; Take 1 capsule (40 mg total) by mouth daily in the early morning    2  Essential hypertension  Assessment & Plan:  - Stable on Diovan daily  Continue same    - Will continue to monitor  3  Neuropathy  Assessment & Plan:  - Not well controlled  - Prescription sent for gabapentin 100 mg TID  - Will continue to monitor  4  Muscle spasm  Assessment & Plan:  - Prescription sent for Robaxin  Advised of side effects   - Will continue to monitor  Orders:  -     methocarbamol (ROBAXIN) 500 mg tablet; Take 1 tablet (500 mg total) by mouth 3 (three) times a day as needed for muscle spasms           Subjective     Patient presents today with complaints of upper abdominal pain after chiropractor adjustment last week  Has pain near her ribs that wraps around her back  Also complains of muscle spasms  She is also having burning epigastric pain that is also wrapping around to her back  She continues taking her omeprazole which she has been on for a while  Continues to see GI  Denies any intake of any triggering food or beverage  Also has complaints of peripheral neuropathy  Podiatrist suggested she go back on her gabapentin  Review of Systems   Constitutional: Negative for fatigue and fever  HENT: Negative for trouble swallowing  Eyes: Negative for visual disturbance  Respiratory: Negative for cough and shortness of breath  Cardiovascular: Negative for chest pain and palpitations  Gastrointestinal: Positive for abdominal pain  Negative for blood in stool  Endocrine: Negative for cold intolerance and heat intolerance  Genitourinary: Negative for difficulty urinating and dysuria  Musculoskeletal: Negative for gait problem  Skin: Negative for rash  Neurological: Positive for numbness  Negative for dizziness, syncope and headaches  Hematological: Negative for adenopathy  Psychiatric/Behavioral: Negative for behavioral problems  Past Medical History:   Diagnosis Date   • Abnormal Pap smear of cervix    • Anxiety    • Diabetes ([address] )    • Eating disorder    • Gallstone    • Genital herpes    • History of gallstones 2018   • HPV (human papilloma virus) infection    • Hypertension    • Varicella     imm     Past Surgical History:   Procedure Laterality Date   •  SECTION     • COLPOSCOPY     • SD  DELIVERY ONLY N/A 2019    Procedure:  SECTION ();   Surgeon: Oswald Lee MD;  Location: [facility];  Service: Obstetrics     Family History   Problem Relation Age of Onset   • Hypertension Mother    • Hyperlipidemia Mother    • Asthma Mother    • Miscarriages / Stillbirths Mother    • Lung cancer Mother    • Liver cancer Mother    • Brain cancer Mother    • Pulmonary embolism Mother    • No Known Problems Father    • Drug abuse Brother    • Mental illness Brother    • No Known Problems Daughter    • Hypertension Maternal Grandmother    • Diabetes Maternal Grandmother         type 2   • Hearing loss Maternal Grandmother    • Early death Maternal Grandmother    • Stroke Maternal Grandmother    • Cervical cancer Maternal Grandmother    • Hyperlipidemia Maternal Aunt    • Early death Maternal Aunt    • Brain cancer Maternal Aunt    • Lung cancer Maternal Aunt      Social History     Socioeconomic History   • Marital status: Single     Spouse name: Beth Paolo   • Number of children: None   • Years of education: [employer]   • Highest education level: None   Occupational History   • Occupation: MA   Tobacco Use   • Smoking status: Never   • Smokeless tobacco: Never   Vaping Use   • Vaping Use: Never used   Substance and Sexual Activity   • Alcohol use: Not Currently   • Drug use: Never   • Sexual activity: Yes     Partners: Male     Birth control/protection: OCP   Other Topics Concern   • None   Social History Narrative   • None     Social Determinants of Health     Financial Resource Strain: Not on file   Food Insecurity: Not on file   Transportation Needs: Not on file   Physical Activity: Not on file   Stress: Not on file   Social Connections: Not on file   Intimate Partner Violence: Not on file   Housing Stability: Not on file     Current Outpatient Medications on File Prior to Visit   Medication Sig   • drospirenone-ethinyl estradiol (TATY) 3-0 02 MG per tablet Take 1 tablet by mouth daily   • omeprazole (PriLOSEC) 40 MG capsule TAKE 1 CAPSULE (40 MG TOTAL) BY MOUTH DAILY     • valsartan-hydrochlorothiazide (DIOVAN-HCT) 320-25 MG per tablet TAKE 1 TABLET BY MOUTH EVERY DAY   • gabapentin (Neurontin) 100 mg capsule Take 1 capsule (100 mg total) by mouth 3 (three) times a day (Patient not taking: Reported on 4/6/2023)   • sucralfate (CARAFATE) 1 g/10 mL suspension Take 10 mL (1 g total) by mouth 4 (four) times a day (with meals and at bedtime) (Patient not taking: Reported on 3/21/2023)     Allergies   Allergen Reactions   • Erythromycin Shortness Of Breath   • Penicillins Hives and Other (See Comments)     Mom told pt she had reaction when she was an infant, but can have Amoxicillin     • Medical Tape Rash     Immunization History   Administered Date(s) Administered   • COVID-19 PFIZER VACCINE 0 3 ML IM 04/09/2021, 04/30/2021   • HPV9 09/10/2019, 12/03/2019, 07/24/2020   • INFLUENZA 11/01/2020   • Influenza Quadrivalent Preservative Free 3 years and older IM 10/22/2021   • Influenza, injectable, quadrivalent, preservative free 0 5 mL 11/14/2018   • "Pneumococcal Polysaccharide PPV23 10/18/2021   • Rabies 09/15/2011, 09/18/2011, 09/22/2011, 09/29/2011   • Tdap 09/15/2011, 03/12/2019   • Tuberculin Skin Test-PPD Intradermal 04/22/2022       Objective     /82 (BP Location: Left arm, Patient Position: Sitting, Cuff Size: Large)   Pulse 88   Temp 97 8 °F (36 6 °C) (Tympanic)   Ht 5' 2\" (1 575 m)   Wt 131 kg (287 lb 12 8 oz)   SpO2 98%   BMI 52 64 kg/m²     Physical Exam  Vitals and nursing note reviewed  Constitutional:       General: She is not in acute distress  Appearance: Normal appearance  She is well-developed  She is not ill-appearing  HENT:      Head: Normocephalic and atraumatic  Right Ear: External ear normal       Left Ear: External ear normal    Eyes:      Conjunctiva/sclera: Conjunctivae normal    Cardiovascular:      Rate and Rhythm: Normal rate and regular rhythm  Heart sounds: Normal heart sounds  Pulmonary:      Effort: Pulmonary effort is normal       Breath sounds: Normal breath sounds  Abdominal:      General: Bowel sounds are normal       Palpations: Abdomen is soft  Musculoskeletal:         General: Normal range of motion  Cervical back: Normal range of motion  Skin:     General: Skin is warm and dry  Neurological:      Mental Status: She is alert and oriented to person, place, and time  Cranial Nerves: No cranial nerve deficit     Psychiatric:         Mood and Affect: Mood normal          Behavior: Behavior normal        SHERMAN Yang  "

## 2023-04-07 NOTE — ASSESSMENT & PLAN NOTE
- Not well controlled  - Suggested switching to Nexium  She is going to discuss with her GI provider   - Avoid triggering food and beverage   - Will continue to monitor

## 2024-12-24 NOTE — TELEPHONE ENCOUNTER
Upon review of the In Basket request and the patient's chart, initial outreach has been made via telephone call to facility  The outcome of the telephone call was a fax request form to be sent to Office/Facility  Please see Contacts section for details     x1 call + fax    Thank you  Gema Rojo Stable

## (undated) DEVICE — ADHESIVE SKN CLSR HISTOACRYL FLEX 0.5ML LF

## (undated) DEVICE — EXTRA LARGE, DISPOSABLE C-SECTION PROTECTOR - RETRACTOR: Brand: ALEXIS ® O C-SECTION PROTECTOR - RETRACTOR

## (undated) DEVICE — GLOVE SRG BIOGEL ECLIPSE 6.5

## (undated) DEVICE — SUT PDS II 1 CTX-B 36 IN ZB371

## (undated) DEVICE — CHLORAPREP HI-LITE 26ML ORANGE

## (undated) DEVICE — SKIN MARKER DUAL TIP WITH RULER CAP, FLEXIBLE RULER AND LABELS: Brand: DEVON

## (undated) DEVICE — PACK C-SECTION PBDS

## (undated) DEVICE — SUT MONOCRYL 4-0 PS-2 27 IN Y426H

## (undated) DEVICE — FISH VISCERAL RETAINER LG

## (undated) DEVICE — GLOVE INDICATOR PI UNDERGLOVE SZ 7 BLUE

## (undated) DEVICE — SUT MONOCRYL 0 CTX 36 IN Y398H

## (undated) DEVICE — Device

## (undated) DEVICE — SUT VICRYL 0 CT-1 36 IN J946H